# Patient Record
Sex: FEMALE | Race: BLACK OR AFRICAN AMERICAN | Employment: FULL TIME | URBAN - METROPOLITAN AREA
[De-identification: names, ages, dates, MRNs, and addresses within clinical notes are randomized per-mention and may not be internally consistent; named-entity substitution may affect disease eponyms.]

---

## 2017-01-16 ENCOUNTER — APPOINTMENT (OUTPATIENT)
Dept: GENERAL RADIOLOGY | Age: 27
End: 2017-01-16
Attending: EMERGENCY MEDICINE
Payer: COMMERCIAL

## 2017-01-16 ENCOUNTER — HOSPITAL ENCOUNTER (EMERGENCY)
Age: 27
Discharge: HOME | End: 2017-01-16
Attending: EMERGENCY MEDICINE
Payer: COMMERCIAL

## 2017-01-16 VITALS
SYSTOLIC BLOOD PRESSURE: 103 MMHG | DIASTOLIC BLOOD PRESSURE: 68 MMHG | HEIGHT: 65 IN | RESPIRATION RATE: 26 BRPM | OXYGEN SATURATION: 98 % | HEART RATE: 115 BPM | WEIGHT: 293 LBS | TEMPERATURE: 98.7 F | BODY MASS INDEX: 48.82 KG/M2

## 2017-01-16 DIAGNOSIS — R50.9 FEVER, UNSPECIFIED FEVER CAUSE: Primary | ICD-10-CM

## 2017-01-16 DIAGNOSIS — B34.9 VIRAL ILLNESS: ICD-10-CM

## 2017-01-16 LAB
ALBUMIN SERPL BCP-MCNC: 3.1 G/DL (ref 3.5–5)
ALBUMIN/GLOB SERPL: 0.7 {RATIO} (ref 1.1–2.2)
ALP SERPL-CCNC: 87 U/L (ref 45–117)
ALT SERPL-CCNC: 23 U/L (ref 12–78)
ANION GAP BLD CALC-SCNC: 8 MMOL/L (ref 5–15)
APPEARANCE UR: CLEAR
AST SERPL W P-5'-P-CCNC: 21 U/L (ref 15–37)
BACTERIA URNS QL MICRO: ABNORMAL /HPF
BASOPHILS # BLD AUTO: 0 K/UL (ref 0–0.1)
BASOPHILS # BLD: 0 % (ref 0–1)
BILIRUB SERPL-MCNC: 0.4 MG/DL (ref 0.2–1)
BILIRUB UR QL: NEGATIVE
BUN SERPL-MCNC: 8 MG/DL (ref 6–20)
BUN/CREAT SERPL: 11 (ref 12–20)
CALCIUM SERPL-MCNC: 8.4 MG/DL (ref 8.5–10.1)
CHLORIDE SERPL-SCNC: 101 MMOL/L (ref 97–108)
CO2 SERPL-SCNC: 25 MMOL/L (ref 21–32)
COLOR UR: ABNORMAL
CREAT SERPL-MCNC: 0.74 MG/DL (ref 0.55–1.02)
DEPRECATED S PYO AG THROAT QL EIA: NEGATIVE
EOSINOPHIL # BLD: 0.1 K/UL (ref 0–0.4)
EOSINOPHIL NFR BLD: 1 % (ref 0–7)
EPITH CASTS URNS QL MICRO: ABNORMAL /LPF
ERYTHROCYTE [DISTWIDTH] IN BLOOD BY AUTOMATED COUNT: 16.5 % (ref 11.5–14.5)
FLUAV AG NPH QL IA: NEGATIVE
FLUBV AG NOSE QL IA: NEGATIVE
GLOBULIN SER CALC-MCNC: 4.3 G/DL (ref 2–4)
GLUCOSE SERPL-MCNC: 92 MG/DL (ref 65–100)
GLUCOSE UR STRIP.AUTO-MCNC: NEGATIVE MG/DL
HCG UR QL: NEGATIVE
HCT VFR BLD AUTO: 34.3 % (ref 35–47)
HGB BLD-MCNC: 10.6 G/DL (ref 11.5–16)
HGB UR QL STRIP: NEGATIVE
HYALINE CASTS URNS QL MICRO: ABNORMAL /LPF (ref 0–5)
KETONES UR QL STRIP.AUTO: NEGATIVE MG/DL
LEUKOCYTE ESTERASE UR QL STRIP.AUTO: NEGATIVE
LIPASE SERPL-CCNC: 68 U/L (ref 73–393)
LYMPHOCYTES # BLD AUTO: 11 % (ref 12–49)
LYMPHOCYTES # BLD: 1.4 K/UL (ref 0.8–3.5)
MCH RBC QN AUTO: 23.9 PG (ref 26–34)
MCHC RBC AUTO-ENTMCNC: 30.9 G/DL (ref 30–36.5)
MCV RBC AUTO: 77.3 FL (ref 80–99)
MONOCYTES # BLD: 0.5 K/UL (ref 0–1)
MONOCYTES NFR BLD AUTO: 4 % (ref 5–13)
NEUTS SEG # BLD: 11.1 K/UL (ref 1.8–8)
NEUTS SEG NFR BLD AUTO: 84 % (ref 32–75)
NITRITE UR QL STRIP.AUTO: NEGATIVE
PH UR STRIP: 7 [PH] (ref 5–8)
PLATELET # BLD AUTO: 426 K/UL (ref 150–400)
POTASSIUM SERPL-SCNC: 4.6 MMOL/L (ref 3.5–5.1)
PROT SERPL-MCNC: 7.4 G/DL (ref 6.4–8.2)
PROT UR STRIP-MCNC: NEGATIVE MG/DL
RBC # BLD AUTO: 4.44 M/UL (ref 3.8–5.2)
RBC #/AREA URNS HPF: ABNORMAL /HPF (ref 0–5)
SODIUM SERPL-SCNC: 134 MMOL/L (ref 136–145)
SP GR UR REFRACTOMETRY: 1.01 (ref 1–1.03)
UA: UC IF INDICATED,UAUC: ABNORMAL
UROBILINOGEN UR QL STRIP.AUTO: 0.2 EU/DL (ref 0.2–1)
WBC # BLD AUTO: 13.1 K/UL (ref 3.6–11)
WBC URNS QL MICRO: ABNORMAL /HPF (ref 0–4)

## 2017-01-16 PROCEDURE — 71020 XR CHEST PA LAT: CPT

## 2017-01-16 PROCEDURE — 74011250636 HC RX REV CODE- 250/636: Performed by: EMERGENCY MEDICINE

## 2017-01-16 PROCEDURE — 96361 HYDRATE IV INFUSION ADD-ON: CPT

## 2017-01-16 PROCEDURE — 74011250637 HC RX REV CODE- 250/637: Performed by: EMERGENCY MEDICINE

## 2017-01-16 PROCEDURE — 36415 COLL VENOUS BLD VENIPUNCTURE: CPT | Performed by: EMERGENCY MEDICINE

## 2017-01-16 PROCEDURE — 87880 STREP A ASSAY W/OPTIC: CPT | Performed by: EMERGENCY MEDICINE

## 2017-01-16 PROCEDURE — 99285 EMERGENCY DEPT VISIT HI MDM: CPT

## 2017-01-16 PROCEDURE — 87070 CULTURE OTHR SPECIMN AEROBIC: CPT | Performed by: EMERGENCY MEDICINE

## 2017-01-16 PROCEDURE — 87804 INFLUENZA ASSAY W/OPTIC: CPT | Performed by: EMERGENCY MEDICINE

## 2017-01-16 PROCEDURE — 81025 URINE PREGNANCY TEST: CPT

## 2017-01-16 PROCEDURE — 85025 COMPLETE CBC W/AUTO DIFF WBC: CPT | Performed by: EMERGENCY MEDICINE

## 2017-01-16 PROCEDURE — 96374 THER/PROPH/DIAG INJ IV PUSH: CPT

## 2017-01-16 PROCEDURE — 94762 N-INVAS EAR/PLS OXIMTRY CONT: CPT

## 2017-01-16 PROCEDURE — 81001 URINALYSIS AUTO W/SCOPE: CPT | Performed by: EMERGENCY MEDICINE

## 2017-01-16 PROCEDURE — 87147 CULTURE TYPE IMMUNOLOGIC: CPT | Performed by: EMERGENCY MEDICINE

## 2017-01-16 PROCEDURE — 83690 ASSAY OF LIPASE: CPT | Performed by: EMERGENCY MEDICINE

## 2017-01-16 PROCEDURE — 87086 URINE CULTURE/COLONY COUNT: CPT | Performed by: EMERGENCY MEDICINE

## 2017-01-16 PROCEDURE — 93005 ELECTROCARDIOGRAM TRACING: CPT

## 2017-01-16 PROCEDURE — 80053 COMPREHEN METABOLIC PANEL: CPT | Performed by: EMERGENCY MEDICINE

## 2017-01-16 RX ORDER — KETOROLAC TROMETHAMINE 30 MG/ML
30 INJECTION, SOLUTION INTRAMUSCULAR; INTRAVENOUS
Status: COMPLETED | OUTPATIENT
Start: 2017-01-16 | End: 2017-01-16

## 2017-01-16 RX ORDER — METFORMIN HYDROCHLORIDE 500 MG/1
500 TABLET ORAL DAILY
COMMUNITY
End: 2019-09-25

## 2017-01-16 RX ORDER — ONDANSETRON 4 MG/1
4 TABLET, ORALLY DISINTEGRATING ORAL
Qty: 10 TAB | Refills: 0 | Status: SHIPPED | OUTPATIENT
Start: 2017-01-16 | End: 2017-11-12

## 2017-01-16 RX ORDER — ACETAMINOPHEN 325 MG/1
650 TABLET ORAL
Status: COMPLETED | OUTPATIENT
Start: 2017-01-16 | End: 2017-01-16

## 2017-01-16 RX ADMIN — KETOROLAC TROMETHAMINE 30 MG: 30 INJECTION, SOLUTION INTRAMUSCULAR at 20:50

## 2017-01-16 RX ADMIN — SODIUM CHLORIDE 1000 ML: 900 INJECTION, SOLUTION INTRAVENOUS at 20:49

## 2017-01-16 RX ADMIN — ACETAMINOPHEN 650 MG: 325 TABLET ORAL at 18:22

## 2017-01-16 NOTE — LETTER
[facility] EMERGENCY DEPT 
[address] [facility] [address] [address] 
[phone] Work/School Note Date: 1/16/2017 To Whom It May concern: 
 
Harsha Contreras was seen and treated today in the emergency room by the following provider(s): 
Attending Provider: Irvin Garcia DO. Harsha Contreras may return to work on 1/19/2017. Sincerely, Irvin Garcia DO

## 2017-01-17 LAB
ATRIAL RATE: 131 BPM
CALCULATED P AXIS, ECG09: 47 DEGREES
CALCULATED R AXIS, ECG10: 14 DEGREES
CALCULATED T AXIS, ECG11: 33 DEGREES
DIAGNOSIS, 93000: NORMAL
P-R INTERVAL, ECG05: 150 MS
Q-T INTERVAL, ECG07: 272 MS
QRS DURATION, ECG06: 64 MS
QTC CALCULATION (BEZET), ECG08: 401 MS
VENTRICULAR RATE, ECG03: 131 BPM

## 2017-01-17 NOTE — DISCHARGE INSTRUCTIONS
We hope that we have addressed all of your medical concerns. The examination and treatment you received in the Emergency Department were for an emergent problem and were not intended as complete care. It is important that you follow up with your healthcare provider(s) for ongoing care. If your symptoms worsen or do not improve as expected, and you are unable to reach your usual health care provider(s), you should return to the Emergency Department. Today's healthcare is undergoing tremendous change, and patient satisfaction surveys are one of the many tools to assess the quality of medical care. You may receive a survey from the [employer] organization regarding your experience in the Emergency Department. I hope that your experience has been completely positive, particularly the medical care that I provided. As such, please participate in the survey; anything less than excellent does not meet my expectations or intentions. [facility] and [facility]) participate in nationally recognized quality of care measures. If your blood pressure is greater than 120/80, as reported below, we urge that you seek medical care to address the potential of high blood pressure, commonly known as hypertension. Hypertension can be hereditary or can be caused by certain medical conditions, pain, stress, or \"white coat syndrome. \"       Please make an appointment with your health care provider(s) for follow up of your Emergency Department visit. VITALS:   Patient Vitals for the past 8 hrs:   Temp Pulse Resp BP SpO2   01/16/17 2208 100 °F (37.8 °C) (!) 115 24 103/68 96 %   01/16/17 2100 (!) 101.3 °F (38.5 °C) (!) 128 24 - 98 %   01/16/17 2045 - - - 136/61 96 %   01/16/17 1737 (!) 102.8 °F (39.3 °C) (!) 142 22 135/84 100 %          Thank you for allowing us to provide you with medical care today. We realize that you have many choices for your emergency care needs. Please choose us in the future for any continued health care needs. Quinn Sanchez Noreen [address] [place] [address].   Office: [phone]            Recent Results (from the past 24 hour(s))   EKG, 12 LEAD, INITIAL    Collection Time: 01/16/17  6:31 PM   Result Value Ref Range    Ventricular Rate 131 BPM    Atrial Rate 131 BPM    P-R Interval 150 ms    QRS Duration 64 ms    Q-T Interval 272 ms    QTC Calculation (Bezet) 401 ms    Calculated P Axis 47 degrees    Calculated R Axis 14 degrees    Calculated T Axis 33 degrees    Diagnosis       Sinus tachycardia  Otherwise normal ECG  When compared with ECG of 30-OCT-2016 18:05,  No significant change was found     INFLUENZA A & B AG (RAPID TEST)    Collection Time: 01/16/17  6:36 PM   Result Value Ref Range    Influenza A Antigen NEGATIVE  NEG      Influenza B Antigen NEGATIVE  NEG     HCG URINE, QL. - POC    Collection Time: 01/16/17  8:35 PM   Result Value Ref Range    Pregnancy test,urine (POC) NEGATIVE  NEG     CBC WITH AUTOMATED DIFF    Collection Time: 01/16/17  8:36 PM   Result Value Ref Range    WBC 13.1 (H) 3.6 - 11.0 K/uL    RBC 4.44 3.80 - 5.20 M/uL    HGB 10.6 (L) 11.5 - 16.0 g/dL    HCT 34.3 (L) 35.0 - 47.0 %    MCV 77.3 (L) 80.0 - 99.0 FL    MCH 23.9 (L) 26.0 - 34.0 PG    MCHC 30.9 30.0 - 36.5 g/dL    RDW 16.5 (H) 11.5 - 14.5 %    PLATELET 668 (H) 370 - 400 K/uL    NEUTROPHILS 84 (H) 32 - 75 %    LYMPHOCYTES 11 (L) 12 - 49 %    MONOCYTES 4 (L) 5 - 13 %    EOSINOPHILS 1 0 - 7 %    BASOPHILS 0 0 - 1 %    ABS. NEUTROPHILS 11.1 (H) 1.8 - 8.0 K/UL    ABS. LYMPHOCYTES 1.4 0.8 - 3.5 K/UL    ABS. MONOCYTES 0.5 0.0 - 1.0 K/UL    ABS. EOSINOPHILS 0.1 0.0 - 0.4 K/UL    ABS.  BASOPHILS 0.0 0.0 - 0.1 K/UL   URINALYSIS W/ REFLEX CULTURE    Collection Time: 01/16/17  8:36 PM   Result Value Ref Range    Color YELLOW/STRAW      Appearance CLEAR CLEAR      Specific gravity 1.014 1.003 - 1.030      pH (UA) 7.0 5.0 - 8.0      Protein NEGATIVE  NEG mg/dL    Glucose NEGATIVE  NEG mg/dL    Ketone NEGATIVE  NEG mg/dL    Bilirubin NEGATIVE  NEG      Blood NEGATIVE  NEG      Urobilinogen 0.2 0.2 - 1.0 EU/dL    Nitrites NEGATIVE  NEG      Leukocyte Esterase NEGATIVE  NEG      WBC 0-4 0 - 4 /hpf    RBC 0-5 0 - 5 /hpf    Epithelial cells FEW FEW /lpf    Bacteria 1+ (A) NEG /hpf    UA:UC IF INDICATED URINE CULTURE ORDERED (A) CNI      Hyaline Cast 0-2 0 - 5 /lpf   LIPASE    Collection Time: 01/16/17  8:36 PM   Result Value Ref Range    Lipase 68 (L) 73 - 393 U/L   STREP AG SCREEN, GROUP A    Collection Time: 01/16/17  8:36 PM   Result Value Ref Range    Group A Strep Ag ID NEGATIVE  NEG     METABOLIC PANEL, COMPREHENSIVE    Collection Time: 01/16/17  8:38 PM   Result Value Ref Range    Sodium 134 (L) 136 - 145 mmol/L    Potassium 4.6 3.5 - 5.1 mmol/L    Chloride 101 97 - 108 mmol/L    CO2 25 21 - 32 mmol/L    Anion gap 8 5 - 15 mmol/L    Glucose 92 65 - 100 mg/dL    BUN 8 6 - 20 MG/DL    Creatinine 0.74 0.55 - 1.02 MG/DL    BUN/Creatinine ratio 11 (L) 12 - 20      GFR est AA >60 >60 ml/min/1.73m2    GFR est non-AA >60 >60 ml/min/1.73m2    Calcium 8.4 (L) 8.5 - 10.1 MG/DL    Bilirubin, total 0.4 0.2 - 1.0 MG/DL    ALT 23 12 - 78 U/L    AST 21 15 - 37 U/L    Alk. phosphatase 87 45 - 117 U/L    Protein, total 7.4 6.4 - 8.2 g/dL    Albumin 3.1 (L) 3.5 - 5.0 g/dL    Globulin 4.3 (H) 2.0 - 4.0 g/dL    A-G Ratio 0.7 (L) 1.1 - 2.2         Xr Chest Pa Lat    Result Date: 1/16/2017  INDICATION:   Cough, chills, headache, weakness, diarrhea COMPARISON: None FINDINGS: Frontal and lateral views of the chest demonstrate a normal cardiomediastinal silhouette. The lungs are adequately expanded. There is no edema, effusion, consolidation, or pneumothorax. The osseous structures are unremarkable. IMPRESSION: No acute process. Learning About Fever  What is a fever? A fever is a high body temperature. It's one way your body fights being sick.  A fever shows that the body is responding to infection or other illnesses, both minor and severe. A fever is a symptom, not an illness by itself. A fever can be a sign that you are ill, but most fevers are not caused by a serious problem. You may have a fever with a minor illness, such as a cold. But sometimes a very serious infection may cause little or no fever. It is important to look at other symptoms, other conditions you have, and how you feel in general. In children, notice how they act and see what symptoms they complain of. What is a normal body temperature? A normal body temperature is about 98. 6ºF. Some people have a normal temperature that is a little higher or a little lower than this. Your temperature may be a little lower in the morning than it is later in the day. It may go up during hot weather or when you exercise, wear heavy clothes, or take a hot bath. Your temperature may also be different depending on how you take it. A temperature taken in the mouth (oral) or under the arm may be a little lower than your core temperature (rectal). What is a fever temperature? A core temperature of 100.4°F or above is considered a fever. What can cause a fever? A fever may be caused by:  · Infections. This is the most common cause of a fever. Examples of infections that can cause a fever include the flu, a kidney infection, or pneumonia. · Some medicines. · Severe trauma or injury, such as a heart attack, stroke, heatstroke, or burns. · Other medical conditions, such as arthritis and some cancers. How can you treat a fever at home? · Ask your doctor if you can take an over-the-counter pain medicine, such as acetaminophen (Tylenol), ibuprofen (Advil, Motrin), or naproxen (Aleve). Be safe with medicines. Read and follow all instructions on the label. · To prevent dehydration, drink plenty of fluids. Choose water and other caffeine-free clear liquids until you feel better.  If you have kidney, heart, or liver disease and have to limit fluids, talk with your doctor before you increase the amount of fluids you drink. Follow-up care is a key part of your treatment and safety. Be sure to make and go to all appointments, and call your doctor if you are having problems. It's also a good idea to know your test results and keep a list of the medicines you take. Where can you learn more? Go to http://fozia-radha.info/. Enter H965 in the search box to learn more about \"Learning About Fever. \"  Current as of: May 27, 2016  Content Version: 11.1  © 2470-9345 [employer]. Care instructions adapted under license by [employer] (which disclaims liability or warranty for this information). If you have questions about a medical condition or this instruction, always ask your healthcare professional. [address] any warranty or liability for your use of this information. Viral Infections: Care Instructions  Your Care Instructions  You don't feel well, but it's not clear what's causing it. You may have a viral infection. Viruses cause many illnesses, such as the common cold, influenza, fever, rashes, and the diarrhea, nausea, and vomiting that are often called \"stomach flu. \" You may wonder if antibiotic medicines could make you feel better. But antibiotics only treat infections caused by bacteria. They don't work on viruses. The good news is that viral infections usually aren't serious. Most will go away in a few days without medical treatment. In the meantime, there are a few things you can do to make yourself more comfortable. Follow-up care is a key part of your treatment and safety. Be sure to make and go to all appointments, and call your doctor if you are having problems. It's also a good idea to know your test results and keep a list of the medicines you take. How can you care for yourself at home? · Get plenty of rest if you feel tired.   · Take an over-the-counter pain medicine if needed, such as acetaminophen (Tylenol), ibuprofen (Advil, Motrin), or naproxen (Aleve). Read and follow all instructions on the label. · Be careful when taking over-the-counter cold or flu medicines and Tylenol at the same time. Many of these medicines have acetaminophen, which is Tylenol. Read the labels to make sure that you are not taking more than the recommended dose. Too much acetaminophen (Tylenol) can be harmful. · Drink plenty of fluids, enough so that your urine is light yellow or clear like water. If you have kidney, heart, or liver disease and have to limit fluids, talk with your doctor before you increase the amount of fluids you drink. · Stay home from work, school, and other public places while you have a fever. When should you call for help? Call 911 anytime you think you may need emergency care. For example, call if:  · You have severe trouble breathing. · You passed out (lost consciousness). Call your doctor now or seek immediate medical care if:  · You seem to be getting much sicker. · You have a new or higher fever. · You have blood in your stools. · You have new belly pain, or your pain gets worse. · You have a new rash. Watch closely for changes in your health, and be sure to contact your doctor if:  · You start to get better and then get worse. · You do not get better as expected. Where can you learn more? Go to http://fozia-radha.info/. Enter U192 in the search box to learn more about \"Viral Infections: Care Instructions. \"  Current as of: May 24, 2016  Content Version: 11.1  © 8251-3261 [employer]. Care instructions adapted under license by [employer] (which disclaims liability or warranty for this information).  If you have questions about a medical condition or this instruction, always ask your healthcare professional. [address]  any warranty or liability for your use of this information.

## 2017-01-17 NOTE — ED PROVIDER NOTES
HPI Comments: 27 y.o. female with past medical history significant for PCOS on Metformin who presents ambulatory from home accompanied by her sister with chief complaint of fever. Pt started with a fever today (Tmax 102.8) accompanied by diffuse abdominal cramping, an 8/10 headache, generalized weakness, and chills. She also reports a productive cough with clear sputum that began 3 months ago as well as diarrhea today, which she attributes to starting Metformin yesterday for her hx of PCOS. She admits she has not taken anything for her symptoms. There are no other acute medical concerns at this time. Social hx: Never smoker; socially EtOH use; no illicit drug use. PCP: Moises Ashley MD    Note written by Tanesha Crocker, as dictated by Delmi Hoskins, DO 8:00 PM      The history is provided by the patient. Past Medical History:   Diagnosis Date    Abnormal pap 1/2014+ 2/2015     LGSIL pap    Anemia NEC     HPV vaccine counseling      gardasil series complete     HSV-2 infection     PCOS (polycystic ovarian syndrome)        Past Surgical History:   Procedure Laterality Date    Hx colposcopy  2/2014 + 4/2015     YAHAIRA I; 2015 neg path         Family History:   Problem Relation Age of Onset    Heart Disease Father     Diabetes Father        Social History     Social History    Marital status: SINGLE     Spouse name: N/A    Number of children: N/A    Years of education: N/A     Occupational History    Not on file.      Social History Main Topics    Smoking status: Never Smoker    Smokeless tobacco: Not on file    Alcohol use 0.0 oz/week     0 Standard drinks or equivalent per week      Comment: socially    Drug use: Not on file    Sexual activity: Yes     Partners: Male     Birth control/ protection: Condom     Other Topics Concern    Not on file     Social History Narrative     ALLERGIES: Aspirin    Review of Systems   Constitutional: Positive for chills, fatigue and fever. Negative for appetite change and unexpected weight change. HENT: Negative for ear pain, hearing loss, rhinorrhea and trouble swallowing. Eyes: Negative for pain and visual disturbance. Respiratory: Negative for cough, chest tightness and shortness of breath. Cardiovascular: Negative for chest pain and palpitations. Gastrointestinal: Positive for abdominal pain (described as cramping) and diarrhea. Negative for abdominal distention, blood in stool, nausea and vomiting. Genitourinary: Negative for dysuria, hematuria and urgency. Musculoskeletal: Negative for back pain and myalgias. Skin: Negative for rash. Neurological: Positive for headaches. Negative for dizziness, syncope, weakness and numbness. Psychiatric/Behavioral: Negative for confusion and suicidal ideas. All other systems reviewed and are negative. Vitals:    01/16/17 1737   BP: 135/84   Pulse: (!) 142   Resp: 22   Temp: (!) 102.8 °F (39.3 °C)   SpO2: 100%   Weight: 136.1 kg (300 lb)   Height: 5' 5\" (1.651 m)            Physical Exam   Constitutional: She is oriented to person, place, and time. She appears well-developed and well-nourished. No distress. HENT:   Head: Normocephalic and atraumatic. Right Ear: Tympanic membrane and external ear normal.   Left Ear: Tympanic membrane and external ear normal.   Nose: Nose normal.   Mouth/Throat: Posterior oropharyngeal erythema present. No oropharyngeal exudate. Eyes: Conjunctivae and EOM are normal. Pupils are equal, round, and reactive to light. Right eye exhibits no discharge. Left eye exhibits no discharge. No scleral icterus. Neck: Normal range of motion. Neck supple. No JVD present. No tracheal deviation present. No meningismus signs. Cardiovascular: Regular rhythm, normal heart sounds and intact distal pulses. Tachycardia present. Exam reveals no gallop and no friction rub. No murmur heard. Pulmonary/Chest: Effort normal. No stridor.  No respiratory distress. She has decreased breath sounds in the left lower field. She has no wheezes. She has no rhonchi. She has no rales. She exhibits no tenderness. Abdominal: Soft. Bowel sounds are normal. She exhibits no distension. There is no tenderness. There is no rebound and no guarding. Musculoskeletal: Normal range of motion. She exhibits no edema or tenderness. Neurological: She is alert and oriented to person, place, and time. She has normal strength and normal reflexes. No cranial nerve deficit or sensory deficit. She exhibits normal muscle tone. Coordination normal. GCS eye subscore is 4. GCS verbal subscore is 5. GCS motor subscore is 6. Skin: Skin is warm and dry. No rash noted. She is not diaphoretic. No erythema. No pallor. Psychiatric: She has a normal mood and affect. Her behavior is normal. Judgment and thought content normal.   Nursing note and vitals reviewed. Note written by Pete Gunderson. Danial Dodge, as dictated by Mami Moore, DO 7:57 PM    MDM  Number of Diagnoses or Management Options  Fever, unspecified fever cause:   Viral illness:      Amount and/or Complexity of Data Reviewed  Clinical lab tests: ordered and reviewed  Tests in the radiology section of CPT®: ordered and reviewed  Tests in the medicine section of CPT®: ordered and reviewed  Independent visualization of images, tracings, or specimens: yes (ekg)    Risk of Complications, Morbidity, and/or Mortality  Presenting problems: moderate  Diagnostic procedures: low  Management options: moderate    Patient Progress  Patient progress: improved    ED Course       Procedures    PROGRESS NOTE:  10:19 PM  Pt was reevaluated and is feeling better. Her fever and heart rate are improved. Chest x-ray shows no acute processes. She will be discharged home with Zofran and PCP follow up.    10:21 PM  Hamilton's results have been reviewed with her.   She has verbally conveyed her understanding and agreement of the signs, symptoms, diagnosis, treatment and prognosis and additionally agree to follow up as recommended or return to the Emergency Room should her condition change prior to follow-up. Discharge instructions have also been provided to the patient with some educational information regarding her diagnosis as well a list of reasons why she would want to return to the ER prior to her follow-up appointment should her condition change. Chief Complaint   Patient presents with    Chills    Headache    Fatigue    Diarrhea       12:34 AM  The patients presenting problems have been discussed, and they are in agreement with the care plan formulated and outlined with them. I have encouraged them to ask questions as they arise throughout their visit.     MEDICATIONS GIVEN:  Medications   acetaminophen (TYLENOL) tablet 650 mg (650 mg Oral Given 1/16/17 1822)   sodium chloride 0.9 % bolus infusion 1,000 mL (0 mL IntraVENous IV Completed 1/16/17 2219)   ketorolac (TORADOL) injection 30 mg (30 mg IntraVENous Given 1/16/17 2050)       LABS REVIEWED:  Recent Results (from the past 24 hour(s))   EKG, 12 LEAD, INITIAL    Collection Time: 01/16/17  6:31 PM   Result Value Ref Range    Ventricular Rate 131 BPM    Atrial Rate 131 BPM    P-R Interval 150 ms    QRS Duration 64 ms    Q-T Interval 272 ms    QTC Calculation (Bezet) 401 ms    Calculated P Axis 47 degrees    Calculated R Axis 14 degrees    Calculated T Axis 33 degrees    Diagnosis       Sinus tachycardia  Otherwise normal ECG  When compared with ECG of 30-OCT-2016 18:05,  No significant change was found     INFLUENZA A & B AG (RAPID TEST)    Collection Time: 01/16/17  6:36 PM   Result Value Ref Range    Influenza A Antigen NEGATIVE  NEG      Influenza B Antigen NEGATIVE  NEG     HCG URINE, QL. - POC    Collection Time: 01/16/17  8:35 PM   Result Value Ref Range    Pregnancy test,urine (POC) NEGATIVE  NEG     CBC WITH AUTOMATED DIFF    Collection Time: 01/16/17  8:36 PM   Result Value Ref Range WBC 13.1 (H) 3.6 - 11.0 K/uL    RBC 4.44 3.80 - 5.20 M/uL    HGB 10.6 (L) 11.5 - 16.0 g/dL    HCT 34.3 (L) 35.0 - 47.0 %    MCV 77.3 (L) 80.0 - 99.0 FL    MCH 23.9 (L) 26.0 - 34.0 PG    MCHC 30.9 30.0 - 36.5 g/dL    RDW 16.5 (H) 11.5 - 14.5 %    PLATELET 748 (H) 530 - 400 K/uL    NEUTROPHILS 84 (H) 32 - 75 %    LYMPHOCYTES 11 (L) 12 - 49 %    MONOCYTES 4 (L) 5 - 13 %    EOSINOPHILS 1 0 - 7 %    BASOPHILS 0 0 - 1 %    ABS. NEUTROPHILS 11.1 (H) 1.8 - 8.0 K/UL    ABS. LYMPHOCYTES 1.4 0.8 - 3.5 K/UL    ABS. MONOCYTES 0.5 0.0 - 1.0 K/UL    ABS. EOSINOPHILS 0.1 0.0 - 0.4 K/UL    ABS.  BASOPHILS 0.0 0.0 - 0.1 K/UL   URINALYSIS W/ REFLEX CULTURE    Collection Time: 01/16/17  8:36 PM   Result Value Ref Range    Color YELLOW/STRAW      Appearance CLEAR CLEAR      Specific gravity 1.014 1.003 - 1.030      pH (UA) 7.0 5.0 - 8.0      Protein NEGATIVE  NEG mg/dL    Glucose NEGATIVE  NEG mg/dL    Ketone NEGATIVE  NEG mg/dL    Bilirubin NEGATIVE  NEG      Blood NEGATIVE  NEG      Urobilinogen 0.2 0.2 - 1.0 EU/dL    Nitrites NEGATIVE  NEG      Leukocyte Esterase NEGATIVE  NEG      WBC 0-4 0 - 4 /hpf    RBC 0-5 0 - 5 /hpf    Epithelial cells FEW FEW /lpf    Bacteria 1+ (A) NEG /hpf    UA:UC IF INDICATED URINE CULTURE ORDERED (A) CNI      Hyaline Cast 0-2 0 - 5 /lpf   LIPASE    Collection Time: 01/16/17  8:36 PM   Result Value Ref Range    Lipase 68 (L) 73 - 393 U/L   STREP AG SCREEN, GROUP A    Collection Time: 01/16/17  8:36 PM   Result Value Ref Range    Group A Strep Ag ID NEGATIVE  NEG     METABOLIC PANEL, COMPREHENSIVE    Collection Time: 01/16/17  8:38 PM   Result Value Ref Range    Sodium 134 (L) 136 - 145 mmol/L    Potassium 4.6 3.5 - 5.1 mmol/L    Chloride 101 97 - 108 mmol/L    CO2 25 21 - 32 mmol/L    Anion gap 8 5 - 15 mmol/L    Glucose 92 65 - 100 mg/dL    BUN 8 6 - 20 MG/DL    Creatinine 0.74 0.55 - 1.02 MG/DL    BUN/Creatinine ratio 11 (L) 12 - 20      GFR est AA >60 >60 ml/min/1.73m2    GFR est non-AA >60 >60 ml/min/1.73m2    Calcium 8.4 (L) 8.5 - 10.1 MG/DL    Bilirubin, total 0.4 0.2 - 1.0 MG/DL    ALT 23 12 - 78 U/L    AST 21 15 - 37 U/L    Alk. phosphatase 87 45 - 117 U/L    Protein, total 7.4 6.4 - 8.2 g/dL    Albumin 3.1 (L) 3.5 - 5.0 g/dL    Globulin 4.3 (H) 2.0 - 4.0 g/dL    A-G Ratio 0.7 (L) 1.1 - 2.2         VITAL SIGNS:  Patient Vitals for the past 12 hrs:   Temp Pulse Resp BP SpO2   01/16/17 2224 98.7 °F (37.1 °C) - - - -   01/16/17 2221 - (!) 115 26 - 98 %   01/16/17 2208 100 °F (37.8 °C) (!) 115 24 103/68 96 %   01/16/17 2208 - (!) 114 19 103/68 98 %   01/16/17 2100 (!) 101.3 °F (38.5 °C) (!) 128 24 - 98 %   01/16/17 2045 - - - 136/61 96 %   01/16/17 1737 (!) 102.8 °F (39.3 °C) (!) 142 22 135/84 100 %       RADIOLOGY RESULTS:  The following have been ordered and reviewed:  XR CHEST PA LAT   Final Result        Study Result      INDICATION: Cough, chills, headache, weakness, diarrhea     COMPARISON: None     FINDINGS:     Frontal and lateral views of the chest demonstrate a normal cardiomediastinal  silhouette. The lungs are adequately expanded. There is no edema, effusion,  consolidation, or pneumothorax. The osseous structures are unremarkable.     IMPRESSION  IMPRESSION:  No acute process. PROGRESS NOTES:  Discussed results and plan with patient. Patient will be discharged home with PCP followup. Patient instructed to return to the emergency room for any worsening symptoms or any other concerns. DIAGNOSIS:    1. Fever, unspecified fever cause    2.  Viral illness        PLAN:  Follow-up Information     Follow up With Details Comments [address] Fanta Gilbert MD Schedule an appointment as soon as possible for a visit  [address] [address]  [phone]      [facility] EMERGENCY DEPT  If symptoms worsen [address]  [phone]        Discharge Medication List as of 1/16/2017 10:18 PM      START taking these medications Details   ondansetron (ZOFRAN ODT) 4 mg disintegrating tablet Take 1 Tab by mouth every eight (8) hours as needed for Nausea. , Print, Disp-10 Tab, R-0         CONTINUE these medications which have NOT CHANGED    Details   metFORMIN (GLUCOPHAGE) 500 mg tablet Take 500 mg by mouth daily. , Historical Med      OTHER Birth control pills, Historical Med      norethindrone-ethinyl estradiol-iron (LOESTRIN FE 1.5/30, 28-DAY,) 1.5 mg-30 mcg (21)/75 mg (7) tab Take 1 Tab by mouth daily. , Normal, Disp-84 Tab, R-2      valACYclovir (VALTREX) 500 mg tablet Take two po for 3 days, Normal, Disp-6 Tab, R-6               ED COURSE: The patients hospital course has been uncomplicated.

## 2017-01-17 NOTE — ED NOTES
Entered room and IV fluids had been stopped and were not infusing. Restarted and instructed pt and family to please call if IV beeps again.

## 2017-01-18 LAB
BACTERIA SPEC CULT: NORMAL
CC UR VC: NORMAL
SERVICE CMNT-IMP: NORMAL
SERVICE CMNT-IMP: NORMAL

## 2017-10-19 ENCOUNTER — HOSPITAL ENCOUNTER (OUTPATIENT)
Dept: ULTRASOUND IMAGING | Age: 27
Discharge: HOME | End: 2017-10-19
Payer: COMMERCIAL

## 2017-10-19 DIAGNOSIS — M79.661 BILATERAL CALF PAIN: ICD-10-CM

## 2017-10-19 DIAGNOSIS — N63.15 BREAST LUMP ON RIGHT SIDE AT 3 O'CLOCK POSITION: ICD-10-CM

## 2017-10-19 DIAGNOSIS — M79.662 BILATERAL CALF PAIN: ICD-10-CM

## 2017-10-19 PROCEDURE — 93971 EXTREMITY STUDY: CPT

## 2017-10-19 PROCEDURE — 76642 ULTRASOUND BREAST LIMITED: CPT

## 2017-11-09 ENCOUNTER — HOSPITAL ENCOUNTER (OUTPATIENT)
Dept: GENERAL RADIOLOGY | Age: 27
Discharge: HOME | End: 2017-11-09
Payer: COMMERCIAL

## 2017-11-09 DIAGNOSIS — R60.1 GENERALIZED EDEMA: ICD-10-CM

## 2017-11-09 DIAGNOSIS — R70.0 ELEVATED SEDIMENTATION RATE: ICD-10-CM

## 2017-11-09 DIAGNOSIS — M25.50 ARTHRALGIA, UNSPECIFIED JOINT: ICD-10-CM

## 2017-11-09 PROCEDURE — 73610 X-RAY EXAM OF ANKLE: CPT

## 2017-11-09 PROCEDURE — 73562 X-RAY EXAM OF KNEE 3: CPT

## 2017-11-09 PROCEDURE — 73110 X-RAY EXAM OF WRIST: CPT

## 2017-11-09 PROCEDURE — 72100 X-RAY EXAM L-S SPINE 2/3 VWS: CPT

## 2017-11-09 PROCEDURE — 71020 XR CHEST PA LAT: CPT

## 2017-11-12 ENCOUNTER — HOSPITAL ENCOUNTER (EMERGENCY)
Age: 27
Discharge: HOME | End: 2017-11-12
Attending: EMERGENCY MEDICINE
Payer: COMMERCIAL

## 2017-11-12 ENCOUNTER — APPOINTMENT (OUTPATIENT)
Dept: ULTRASOUND IMAGING | Age: 27
End: 2017-11-12
Attending: NURSE PRACTITIONER
Payer: COMMERCIAL

## 2017-11-12 VITALS
HEIGHT: 65 IN | TEMPERATURE: 98.3 F | RESPIRATION RATE: 17 BRPM | OXYGEN SATURATION: 100 % | SYSTOLIC BLOOD PRESSURE: 147 MMHG | WEIGHT: 293 LBS | DIASTOLIC BLOOD PRESSURE: 77 MMHG | BODY MASS INDEX: 48.82 KG/M2 | HEART RATE: 83 BPM

## 2017-11-12 DIAGNOSIS — N93.9 ABNORMAL UTERINE BLEEDING: Primary | ICD-10-CM

## 2017-11-12 LAB
APPEARANCE UR: CLEAR
BACTERIA URNS QL MICRO: NEGATIVE /HPF
BASOPHILS # BLD: 0 K/UL (ref 0–0.1)
BASOPHILS NFR BLD: 0 % (ref 0–1)
BILIRUB UR QL: NEGATIVE
CLUE CELLS VAG QL WET PREP: NORMAL
COLOR UR: ABNORMAL
EOSINOPHIL # BLD: 0.3 K/UL (ref 0–0.4)
EOSINOPHIL NFR BLD: 2 % (ref 0–7)
EPITH CASTS URNS QL MICRO: ABNORMAL /LPF
ERYTHROCYTE [DISTWIDTH] IN BLOOD BY AUTOMATED COUNT: 14.8 % (ref 11.5–14.5)
GLUCOSE UR STRIP.AUTO-MCNC: NEGATIVE MG/DL
HCT VFR BLD AUTO: 32.2 % (ref 35–47)
HGB BLD-MCNC: 10.7 G/DL (ref 11.5–16)
HGB UR QL STRIP: ABNORMAL
HYALINE CASTS URNS QL MICRO: ABNORMAL /LPF (ref 0–5)
KETONES UR QL STRIP.AUTO: NEGATIVE MG/DL
KOH PREP SPEC: NORMAL
LEUKOCYTE ESTERASE UR QL STRIP.AUTO: NEGATIVE
LYMPHOCYTES # BLD: 3.8 K/UL (ref 0.8–3.5)
LYMPHOCYTES NFR BLD: 31 % (ref 12–49)
MCH RBC QN AUTO: 27.5 PG (ref 26–34)
MCHC RBC AUTO-ENTMCNC: 33.2 G/DL (ref 30–36.5)
MCV RBC AUTO: 82.8 FL (ref 80–99)
MONOCYTES # BLD: 0.7 K/UL (ref 0–1)
MONOCYTES NFR BLD: 6 % (ref 5–13)
NEUTS SEG # BLD: 7.6 K/UL (ref 1.8–8)
NEUTS SEG NFR BLD: 61 % (ref 32–75)
NITRITE UR QL STRIP.AUTO: NEGATIVE
PH UR STRIP: 6 [PH] (ref 5–8)
PLATELET # BLD AUTO: 344 K/UL (ref 150–400)
PROT UR STRIP-MCNC: NEGATIVE MG/DL
RBC # BLD AUTO: 3.89 M/UL (ref 3.8–5.2)
RBC #/AREA URNS HPF: >100 /HPF (ref 0–5)
SERVICE CMNT-IMP: NORMAL
SP GR UR REFRACTOMETRY: 1.01 (ref 1–1.03)
T VAGINALIS VAG QL WET PREP: NORMAL
UA: UC IF INDICATED,UAUC: ABNORMAL
UROBILINOGEN UR QL STRIP.AUTO: 0.2 EU/DL (ref 0.2–1)
WBC # BLD AUTO: 12.4 K/UL (ref 3.6–11)
WBC URNS QL MICRO: ABNORMAL /HPF (ref 0–4)

## 2017-11-12 PROCEDURE — 76856 US EXAM PELVIC COMPLETE: CPT

## 2017-11-12 PROCEDURE — 36415 COLL VENOUS BLD VENIPUNCTURE: CPT | Performed by: NURSE PRACTITIONER

## 2017-11-12 PROCEDURE — 99284 EMERGENCY DEPT VISIT MOD MDM: CPT

## 2017-11-12 PROCEDURE — 81001 URINALYSIS AUTO W/SCOPE: CPT | Performed by: EMERGENCY MEDICINE

## 2017-11-12 PROCEDURE — 76830 TRANSVAGINAL US NON-OB: CPT

## 2017-11-12 PROCEDURE — 87491 CHLMYD TRACH DNA AMP PROBE: CPT | Performed by: NURSE PRACTITIONER

## 2017-11-12 PROCEDURE — 87210 SMEAR WET MOUNT SALINE/INK: CPT | Performed by: NURSE PRACTITIONER

## 2017-11-12 PROCEDURE — 85025 COMPLETE CBC W/AUTO DIFF WBC: CPT | Performed by: NURSE PRACTITIONER

## 2017-11-12 RX ORDER — NALTREXONE HYDROCHLORIDE 50 MG/1
50 TABLET, FILM COATED ORAL DAILY
COMMUNITY
End: 2019-07-14

## 2017-11-12 RX ORDER — MEDROXYPROGESTERONE ACETATE 10 MG/1
10 TABLET ORAL DAILY
COMMUNITY
End: 2020-04-16

## 2017-11-12 RX ORDER — NORETHINDRONE ACETATE AND ETHINYL ESTRADIOL 1; .02 MG/1; MG/1
TABLET ORAL
COMMUNITY
End: 2020-04-16

## 2017-11-12 RX ORDER — SERTRALINE HYDROCHLORIDE 50 MG/1
50 TABLET, FILM COATED ORAL DAILY
COMMUNITY
End: 2019-11-18

## 2017-11-12 RX ORDER — MELOXICAM 15 MG/1
15 TABLET ORAL DAILY
COMMUNITY
End: 2019-07-14

## 2017-11-12 NOTE — ED TRIAGE NOTES
Patient states she has had vaginal bleeding x 11 days, saturating a pad or more per hours with golf ball sized clots. Patient states obgyn put her on provera and birth control and states it did not help.

## 2017-11-12 NOTE — DISCHARGE INSTRUCTIONS
If you begin having any chest pain or shortness of breath and dizziness, please return to the emergency room. Please follow up ASAP with your GYN and inform them of this visit to the emergency room. Abnormal Uterine Bleeding: Care Instructions  Your Care Instructions    Abnormal uterine bleeding (AUB) is irregular bleeding from the uterus that is longer or heavier than usual or does not occur at your regular time. Sometimes it is caused by changes in hormone levels. It can also be caused by growths in the uterus, such as fibroids or polyps. Sometimes a cause cannot be found. You may have heavy bleeding when you are not expecting your period. Your doctor may suggest a pregnancy test, if you think you are pregnant. Follow-up care is a key part of your treatment and safety. Be sure to make and go to all appointments, and call your doctor if you are having problems. It's also a good idea to know your test results and keep a list of the medicines you take. How can you care for yourself at home? · Be safe with medicines. Take pain medicines exactly as directed. ¨ If the doctor gave you a prescription medicine for pain, take it as prescribed. ¨ If you are not taking a prescription pain medicine, ask your doctor if you can take an over-the-counter medicine. · You may be low in iron because of blood loss. Eat a balanced diet that is high in iron and vitamin C. Foods rich in iron include red meat, shellfish, eggs, beans, and leafy green vegetables. Talk to your doctor about whether you need to take iron pills or a multivitamin. When should you call for help? Call 911 anytime you think you may need emergency care. For example, call if:  ? · You passed out (lost consciousness). ?Call your doctor now or seek immediate medical care if:  ? · You have new or worse belly or pelvic pain. ? · You have severe vaginal bleeding. ? · You feel dizzy or lightheaded, or you feel like you may faint. ? Watch closely for changes in your health, and be sure to contact your doctor if:  ? · You think you may be pregnant. ? · Your bleeding gets worse. ? · You do not get better as expected. Where can you learn more? Go to http://fozia-radha.info/. Enter W952 in the search box to learn more about \"Abnormal Uterine Bleeding: Care Instructions. \"  Current as of: October 13, 2016  Content Version: 11.4  © 1073-8586 [employer]. Care instructions adapted under license by [employer] (which disclaims liability or warranty for this information). If you have questions about a medical condition or this instruction, always ask your healthcare professional. [address] any warranty or liability for your use of this information.

## 2017-11-12 NOTE — ED PROVIDER NOTES
HPI Comments: 24 y.o. female with past medical history significant for anemia, PCOS, HSV-2 infection, sleep apnea, and prediabetes who presents from home with chief complaint of vaginal bleeding. Patient states that she has been having her period on and off for 11 weeks. She reports being seen by her OBGYN and was prescribed progesterone which relieved some of the bleeding. 1 week ago, patient was placed on birth control which she states has not lessened her bleeding. This morning, patient states that she woke up with her sheets \"soaked\" with blood. She currently complains of abdominal cramping  but denies any chance of pregnancy. Patient denies having any fever, chills, SOB, chest pain, nausea or vomiting. She denies being on any anticoagulation. There are no other acute medical concerns at this time. Social hx: nonsmoker, + EtOH use, no drug use  PCP: Carols Manuel Bonilla MD    Past Medical History:  1/2014+ 2/2015: Abnormal pap      Comment: LGSIL pap  No date: Anemia NEC  No date: HPV vaccine counseling      Comment: gardasil series complete   No date: HSV-2 infection  No date: PCOS (polycystic ovarian syndrome)  No date: Prediabetes  No date: Sleep disorder      Comment: sleep apnea  No date: Vitamin D deficiency  Past Surgical History:  2/2014 + 4/2015: HX COLPOSCOPY      Comment: YAHAIRA I; 2015 neg path    Note written by Ishmael Banuelos, as dictated by Vane Venegas NP 5:33 PM      The history is provided by the patient. No  was used.         Past Medical History:   Diagnosis Date    Abnormal pap 1/2014+ 2/2015    LGSIL pap    Anemia NEC     HPV vaccine counseling     gardasil series complete     HSV-2 infection     PCOS (polycystic ovarian syndrome)     Prediabetes     Sleep disorder     sleep apnea    Vitamin D deficiency        Past Surgical History:   Procedure Laterality Date    HX COLPOSCOPY  2/2014 + 4/2015    YAHAIRA I; 2015 neg path         Family History: Problem Relation Age of Onset    Heart Disease Father     Diabetes Father        Social History     Social History    Marital status: SINGLE     Spouse name: N/A    Number of children: N/A    Years of education: N/A     Occupational History    Not on file. Social History Main Topics    Smoking status: Never Smoker    Smokeless tobacco: Never Used    Alcohol use 0.0 oz/week     0 Standard drinks or equivalent per week      Comment: socially    Drug use: Not on file    Sexual activity: Yes     Partners: Male     Birth control/ protection: Condom     Other Topics Concern    Not on file     Social History Narrative         ALLERGIES: Aspirin    Review of Systems   Constitutional: Negative for appetite change, chills, diaphoresis, fatigue and fever. HENT: Negative for congestion, ear discharge, ear pain, sinus pain, sinus pressure, sore throat and trouble swallowing. Eyes: Negative for photophobia, pain, redness and visual disturbance. Respiratory: Negative for cough, chest tightness, shortness of breath and wheezing. Cardiovascular: Negative for chest pain, palpitations and leg swelling. Gastrointestinal: Positive for abdominal pain (abdominal cramping). Negative for abdominal distention, diarrhea, nausea and vomiting. Endocrine: Negative. Genitourinary: Positive for menstrual problem and vaginal bleeding. Negative for difficulty urinating, dysuria, flank pain, frequency and urgency. Musculoskeletal: Negative. Negative for back pain, neck pain and neck stiffness. Skin: Negative for color change, pallor, rash and wound. Allergic/Immunologic: Negative. Neurological: Negative. Negative for dizziness, syncope, speech difficulty, weakness and headaches. Hematological: Does not bruise/bleed easily. Psychiatric/Behavioral: Negative for behavioral problems and confusion. The patient is not nervous/anxious. All other systems reviewed and are negative.       Vitals:    11/12/17 1312   BP: 149/71   Pulse: 96   Resp: 18   Temp: 98.3 °F (36.8 °C)   SpO2: 97%   Weight: 139.7 kg (308 lb)   Height: 5' 5\" (1.651 m)            Physical Exam   Constitutional: She is oriented to person, place, and time. She appears well-developed and well-nourished. No distress. HENT:   Head: Normocephalic and atraumatic. Right Ear: External ear normal.   Left Ear: External ear normal.   Nose: Nose normal.   Mouth/Throat: Oropharynx is clear and moist.   Eyes: Conjunctivae and EOM are normal. Pupils are equal, round, and reactive to light. Right eye exhibits no discharge. Left eye exhibits no discharge. Neck: Normal range of motion. Neck supple. No JVD present. No tracheal deviation present. Cardiovascular: Normal rate, regular rhythm, normal heart sounds and intact distal pulses. Exam reveals no gallop. No murmur heard. Pulmonary/Chest: Effort normal and breath sounds normal. No respiratory distress. She has no wheezes. She has no rales. She exhibits no tenderness. Abdominal: Soft. Bowel sounds are normal. She exhibits no distension. There is tenderness (\"cramping\"). There is no rebound and no guarding. Genitourinary:   Genitourinary Comments: Negative     Musculoskeletal: Normal range of motion. She exhibits no edema or tenderness. Neurological: She is alert and oriented to person, place, and time. She has normal strength. No cranial nerve deficit. Skin: Skin is warm and dry. No rash noted. No erythema. No pallor. Psychiatric: She has a normal mood and affect. Her behavior is normal. Judgment and thought content normal.   Nursing note and vitals reviewed. Note written by Ishmael Carrasco, as dictated by Kadie Estes 5:36 PM    MDM  Number of Diagnoses or Management Options  Abnormal uterine bleeding: established and worsening  Diagnosis management comments: Plan:  Discharge to home and follow up with GYN.        Amount and/or Complexity of Data Reviewed  Clinical lab tests: ordered and reviewed  Tests in the radiology section of CPT®: ordered and reviewed      ED Course       1501: Pelvic exam complete as documented. 1524: Reviewed available labs. Hgb stable. 4:46 PM  Pt has been reexamined. Pt has no new complaints, changes or physical findings. Care plan outlined and precautions discussed. All available results were reviewed with pt. All medications were reviewed with pt. All of pt's questions and concerns were addressed. Pt agrees to F/U as instructed and agrees to return to ED upon further deterioration. Pt is ready to go home. Oxana Moss NP      Pelvic Exam  Date/Time: 11/12/2017 3:01 PM  Performed by: NP  Procedure duration:  3 minutes. Exam assisted by:  Desiree Barros RN. Type of exam performed: bimanual and speculum. External genitalia appearance: normal.    Vaginal exam:  bleeding. Bleeding: pooling  Cervical exam:  inadequately visualized. Specimen(s) collected:  GC and vaginal culture. Bimanual exam:  normal.    Patient tolerance: Patient tolerated the procedure well with no immediate complications  Comments: Large amounts vaginal pooling of blood noted.

## 2017-11-13 LAB
C TRACH DNA SPEC QL NAA+PROBE: NEGATIVE
N GONORRHOEA DNA SPEC QL NAA+PROBE: NEGATIVE
SAMPLE TYPE: NORMAL
SERVICE CMNT-IMP: NORMAL
SPECIMEN SOURCE: NORMAL

## 2018-01-19 ENCOUNTER — HOSPITAL ENCOUNTER (EMERGENCY)
Age: 28
Discharge: HOME | End: 2018-01-19
Attending: EMERGENCY MEDICINE
Payer: SELF-PAY

## 2018-01-19 VITALS
SYSTOLIC BLOOD PRESSURE: 142 MMHG | TEMPERATURE: 98.6 F | BODY MASS INDEX: 48.82 KG/M2 | RESPIRATION RATE: 18 BRPM | HEIGHT: 65 IN | DIASTOLIC BLOOD PRESSURE: 89 MMHG | OXYGEN SATURATION: 100 % | WEIGHT: 293 LBS | HEART RATE: 106 BPM

## 2018-01-19 DIAGNOSIS — J02.9 PHARYNGITIS, UNSPECIFIED ETIOLOGY: Primary | ICD-10-CM

## 2018-01-19 PROCEDURE — 74011250636 HC RX REV CODE- 250/636: Performed by: FAMILY MEDICINE

## 2018-01-19 PROCEDURE — 99283 EMERGENCY DEPT VISIT LOW MDM: CPT

## 2018-01-19 RX ORDER — DEXAMETHASONE 4 MG/1
10 TABLET ORAL
Status: COMPLETED | OUTPATIENT
Start: 2018-01-19 | End: 2018-01-19

## 2018-01-19 RX ADMIN — DEXAMETHASONE 10 MG: 4 TABLET ORAL at 12:31

## 2018-01-19 NOTE — ED PROVIDER NOTES
HPI Comments: 23 yo F diagnosed with flu on Tuesday presents with sore throat, ear fullness. Currently on tamiflu, codeine for cough. Reports worsening sore throat, difficulty swallowing. Also notes ear fullness. No fever. Patient is a 24 y.o. female presenting with sore throat. Sore Throat    Associated symptoms include trouble swallowing and cough. Pertinent negatives include no vomiting. Past Medical History:   Diagnosis Date    Abnormal pap 1/2014+ 2/2015    LGSIL pap    Anemia NEC     HPV vaccine counseling     gardasil series complete     HSV-2 infection     PCOS (polycystic ovarian syndrome)     Prediabetes     Sleep disorder     sleep apnea    Vitamin D deficiency        Past Surgical History:   Procedure Laterality Date    HX COLPOSCOPY  2/2014 + 4/2015    YAHAIRA I; 2015 neg path         Family History:   Problem Relation Age of Onset    Heart Disease Father     Diabetes Father        Social History     Social History    Marital status: SINGLE     Spouse name: N/A    Number of children: N/A    Years of education: N/A     Occupational History    Not on file. Social History Main Topics    Smoking status: Never Smoker    Smokeless tobacco: Never Used    Alcohol use 0.0 oz/week     0 Standard drinks or equivalent per week      Comment: socially    Drug use: Not on file    Sexual activity: Yes     Partners: Male     Birth control/ protection: Condom     Other Topics Concern    Not on file     Social History Narrative         ALLERGIES: Aspirin    Review of Systems   Constitutional: Negative for fever. HENT: Positive for sore throat and trouble swallowing. Ear fullness   Respiratory: Positive for cough. Cardiovascular: Negative for chest pain. Gastrointestinal: Negative for abdominal pain, nausea and vomiting. All other systems reviewed and are negative.       Vitals:    01/19/18 1128   BP: 142/89   Pulse: (!) 106   Resp: 18   Temp: 98.6 °F (37 °C)   SpO2: 100% Weight: 135.2 kg (298 lb)   Height: 5' 5\" (1.651 m)            Physical Exam   Constitutional: She is oriented to person, place, and time. She appears well-developed and well-nourished. She appears ill. HENT:   Head: Normocephalic. Right Ear: External ear normal.   Left Ear: External ear normal.   Mouth/Throat: Posterior oropharyngeal edema and posterior oropharyngeal erythema present. No oropharyngeal exudate or tonsillar abscesses. Eyes: EOM are normal. Right eye exhibits no discharge. Left eye exhibits no discharge. Neck: Normal range of motion. Neck supple. Cardiovascular: Normal rate and regular rhythm. Exam reveals no gallop and no friction rub. No murmur heard. Pulmonary/Chest: Effort normal and breath sounds normal. No respiratory distress. She has no wheezes. She has no rales. Abdominal: Soft. There is no tenderness. Lymphadenopathy:     She has cervical adenopathy. Right cervical: Superficial cervical adenopathy present. Left cervical: Superficial cervical adenopathy present. Neurological: She is alert and oriented to person, place, and time. Skin: Skin is warm and dry. Psychiatric: She has a normal mood and affect. Her behavior is normal. Judgment and thought content normal.   Nursing note and vitals reviewed. MDM  Number of Diagnoses or Management Options  Pharyngitis, unspecified etiology:   Diagnosis management comments: 23 yo F previously diagnosed with flu who presents with sore throat and ear fullness. Meets 1/4 Centor criteria (tender LAD), precluding need to test for strep.  Tonsils are swollen, otherwise normal exam.  - Decadron now       Amount and/or Complexity of Data Reviewed  Tests in the medicine section of CPT®: ordered and reviewed  Review and summarize past medical records: yes  Discuss the patient with other providers: yes    Risk of Complications, Morbidity, and/or Mortality  Presenting problems: low  Diagnostic procedures: low  Management options: low    Patient Progress  Patient progress: stable (Did not meet Centor criteria for testing. Given decadron here.  Will discharge home with supportive therapy. )    ED Course       Procedures

## 2019-02-01 ENCOUNTER — HOSPITAL ENCOUNTER (OUTPATIENT)
Dept: GENERAL RADIOLOGY | Age: 29
Discharge: HOME | End: 2019-02-01
Payer: COMMERCIAL

## 2019-02-01 DIAGNOSIS — R52 ACUTE PAIN: ICD-10-CM

## 2019-02-01 PROCEDURE — 73564 X-RAY EXAM KNEE 4 OR MORE: CPT

## 2019-07-14 ENCOUNTER — HOSPITAL ENCOUNTER (EMERGENCY)
Age: 29
Discharge: HOME | End: 2019-07-14
Attending: EMERGENCY MEDICINE
Payer: COMMERCIAL

## 2019-07-14 ENCOUNTER — APPOINTMENT (OUTPATIENT)
Dept: GENERAL RADIOLOGY | Age: 29
End: 2019-07-14
Attending: EMERGENCY MEDICINE
Payer: COMMERCIAL

## 2019-07-14 VITALS
WEIGHT: 293 LBS | TEMPERATURE: 98.8 F | HEIGHT: 65 IN | BODY MASS INDEX: 48.82 KG/M2 | SYSTOLIC BLOOD PRESSURE: 159 MMHG | RESPIRATION RATE: 20 BRPM | HEART RATE: 112 BPM | DIASTOLIC BLOOD PRESSURE: 87 MMHG | OXYGEN SATURATION: 94 %

## 2019-07-14 DIAGNOSIS — M54.50 ACUTE RIGHT-SIDED LOW BACK PAIN WITHOUT SCIATICA: ICD-10-CM

## 2019-07-14 DIAGNOSIS — S39.012A STRAIN OF LUMBAR REGION, INITIAL ENCOUNTER: Primary | ICD-10-CM

## 2019-07-14 PROCEDURE — 74011250637 HC RX REV CODE- 250/637: Performed by: EMERGENCY MEDICINE

## 2019-07-14 PROCEDURE — 96372 THER/PROPH/DIAG INJ SC/IM: CPT

## 2019-07-14 PROCEDURE — 99282 EMERGENCY DEPT VISIT SF MDM: CPT

## 2019-07-14 PROCEDURE — 74011250636 HC RX REV CODE- 250/636: Performed by: EMERGENCY MEDICINE

## 2019-07-14 PROCEDURE — 74011636637 HC RX REV CODE- 636/637: Performed by: EMERGENCY MEDICINE

## 2019-07-14 PROCEDURE — 72100 X-RAY EXAM L-S SPINE 2/3 VWS: CPT

## 2019-07-14 RX ORDER — HYDROCODONE BITARTRATE AND ACETAMINOPHEN 5; 325 MG/1; MG/1
1 TABLET ORAL
Status: COMPLETED | OUTPATIENT
Start: 2019-07-14 | End: 2019-07-14

## 2019-07-14 RX ORDER — KETOROLAC TROMETHAMINE 30 MG/ML
60 INJECTION, SOLUTION INTRAMUSCULAR; INTRAVENOUS
Status: COMPLETED | OUTPATIENT
Start: 2019-07-14 | End: 2019-07-14

## 2019-07-14 RX ORDER — CYCLOBENZAPRINE HCL 10 MG
10 TABLET ORAL
Qty: 20 TAB | Refills: 0 | Status: SHIPPED | OUTPATIENT
Start: 2019-07-14 | End: 2021-07-22

## 2019-07-14 RX ORDER — PREDNISONE 10 MG/1
TABLET ORAL
Qty: 21 TAB | Refills: 0 | Status: SHIPPED | OUTPATIENT
Start: 2019-07-14 | End: 2019-09-25

## 2019-07-14 RX ORDER — HYDROCODONE BITARTRATE AND ACETAMINOPHEN 5; 325 MG/1; MG/1
1 TABLET ORAL
Qty: 12 TAB | Refills: 0 | Status: SHIPPED | OUTPATIENT
Start: 2019-07-14 | End: 2019-07-19

## 2019-07-14 RX ORDER — PREDNISONE 20 MG/1
60 TABLET ORAL
Status: COMPLETED | OUTPATIENT
Start: 2019-07-14 | End: 2019-07-14

## 2019-07-14 RX ORDER — CYCLOBENZAPRINE HCL 10 MG
10 TABLET ORAL
Status: COMPLETED | OUTPATIENT
Start: 2019-07-14 | End: 2019-07-14

## 2019-07-14 RX ADMIN — KETOROLAC TROMETHAMINE 60 MG: 30 INJECTION, SOLUTION INTRAMUSCULAR at 22:27

## 2019-07-14 RX ADMIN — PREDNISONE 60 MG: 20 TABLET ORAL at 22:26

## 2019-07-14 RX ADMIN — CYCLOBENZAPRINE HYDROCHLORIDE 10 MG: 10 TABLET, FILM COATED ORAL at 22:27

## 2019-07-14 RX ADMIN — HYDROCODONE BITARTRATE AND ACETAMINOPHEN 1 TABLET: 5; 325 TABLET ORAL at 22:26

## 2019-07-15 NOTE — ED NOTES
Discharge instructions given to patient. Understanding verbalized. All questions answered. Patient told not to drive while on narcotic medications. Discharged home with family. Assisted to personal vehicle via wheelchair.

## 2019-07-15 NOTE — ED TRIAGE NOTES
Patient states that she has OA in her back, it has been bothering her for about a month. Today she was trying to stretch her back and she felt something pop in her lower back.   occ about 10 mins pta

## 2019-07-15 NOTE — ED PROVIDER NOTES
34 y.o. female with past medical history significant for anemia, PCOS, sleep apnea, vitamin D deficiency, and prediabetes presents ambulatory and accompanied by spouse with chief complaint of right lower back pain that radiates down her right lower extremity, currently an 8/10 in severity, onset approximately 30 minutes ago. The pt's spouse explains that the pt was bending over while stretching, where she began experiencing the abrupt onset of pain. She denies having taken any medication for pain management. Of note, the pt endorses a history of chronic back pain associated with osteoarthritis, but indicates that she has not followed up with her back specialist in [facility]. \" Pt denies being on any daily medication regimens. Pt denies any urinary symptoms, nausea, vomiting, or any other symptoms at this time. There are no other acute medical concerns at this time. Social hx: Patient denies Tobacco use. Reports social EtOH use. No reported illicit drug abuse. PCP: Other, MD Marta    Note written by Ra Diego, as dictated by Ida Ledbetter DO 10:04 PM      The history is provided by the patient and the spouse. No  was used.         Past Medical History:   Diagnosis Date    Abnormal pap 1/2014+ 2/2015    LGSIL pap    Anemia NEC     HPV vaccine counseling     gardasil series complete     HSV-2 infection     PCOS (polycystic ovarian syndrome)     Prediabetes     Sleep disorder     sleep apnea    Vitamin D deficiency        Past Surgical History:   Procedure Laterality Date    HX COLPOSCOPY  2/2014 + 4/2015    YAHAIRA I; 2015 neg path         Family History:   Problem Relation Age of Onset    Heart Disease Father     Diabetes Father        Social History     Socioeconomic History    Marital status:      Spouse name: Not on file    Number of children: Not on file    Years of education: Not on file    Highest education level: Not on file   Occupational History  Not on file   Social Needs    Financial resource strain: Not on file    Food insecurity:     Worry: Not on file     Inability: Not on file    Transportation needs:     Medical: Not on file     Non-medical: Not on file   Tobacco Use    Smoking status: Never Smoker    Smokeless tobacco: Never Used   Substance and Sexual Activity    Alcohol use: Yes     Alcohol/week: 0.0 oz     Comment: socially    Drug use: Not on file    Sexual activity: Yes     Partners: Male     Birth control/protection: Condom   Lifestyle    Physical activity:     Days per week: Not on file     Minutes per session: Not on file    Stress: Not on file   Relationships    Social connections:     Talks on phone: Not on file     Gets together: Not on file     Attends Mu-ism service: Not on file     Active member of club or organization: Not on file     Attends meetings of clubs or organizations: Not on file     Relationship status: Not on file    Intimate partner violence:     Fear of current or ex partner: Not on file     Emotionally abused: Not on file     Physically abused: Not on file     Forced sexual activity: Not on file   Other Topics Concern    Not on file   Social History Narrative    Not on file     ALLERGIES: Aspirin    Review of Systems   Constitutional: Negative for appetite change, chills, fever and unexpected weight change. HENT: Negative for ear pain, hearing loss, rhinorrhea and trouble swallowing. Eyes: Negative for pain and visual disturbance. Respiratory: Negative for cough, chest tightness and shortness of breath. Cardiovascular: Negative for chest pain and palpitations. Gastrointestinal: Negative for abdominal distention, abdominal pain, blood in stool, nausea and vomiting. Genitourinary: Negative for dysuria, hematuria and urgency. Musculoskeletal: Positive for back pain (right sided) and myalgias (RLE). Skin: Negative for rash.    Neurological: Negative for dizziness, syncope, weakness and numbness. Psychiatric/Behavioral: Negative for confusion and suicidal ideas. All other systems reviewed and are negative. Vitals:    07/14/19 2152   BP: 159/87   Pulse: (!) 112   Resp: 20   Temp: 98.8 °F (37.1 °C)   SpO2: 94%   Weight: 138.3 kg (305 lb)   Height: 5' 5\" (1.651 m)            Physical Exam   Constitutional: She is oriented to person, place, and time. She appears well-developed and well-nourished. No distress. HENT:   Head: Normocephalic and atraumatic. Right Ear: External ear normal.   Left Ear: External ear normal.   Nose: Nose normal.   Mouth/Throat: Oropharynx is clear and moist. No oropharyngeal exudate. Eyes: Pupils are equal, round, and reactive to light. Conjunctivae and EOM are normal. Right eye exhibits no discharge. Left eye exhibits no discharge. No scleral icterus. Neck: Normal range of motion. Neck supple. No JVD present. No tracheal deviation present. Cardiovascular: Normal rate, regular rhythm, normal heart sounds and intact distal pulses. Exam reveals no gallop and no friction rub. No murmur heard. Pulmonary/Chest: Effort normal and breath sounds normal. No stridor. No respiratory distress. She has no decreased breath sounds. She has no wheezes. She has no rhonchi. She has no rales. She exhibits no tenderness. Abdominal: Soft. Bowel sounds are normal. She exhibits no distension. There is no tenderness. There is no rebound and no guarding. Musculoskeletal: Normal range of motion. She exhibits no edema or tenderness. Diffuse right sided lumbar paraspinal tenderness present. Neurological: She is alert and oriented to person, place, and time. She has normal strength and normal reflexes. She displays normal reflexes. No cranial nerve deficit or sensory deficit. She exhibits normal muscle tone. Coordination normal. GCS eye subscore is 4. GCS verbal subscore is 5. GCS motor subscore is 6. Skin: Skin is warm and dry. No rash noted. She is not diaphoretic.  No erythema. No pallor. Psychiatric: She has a normal mood and affect. Her behavior is normal. Judgment and thought content normal.   Nursing note and vitals reviewed. Note written by Alexander Simms, as dictated by Radha Gonzalez DO 10:04 PM     MDM  Number of Diagnoses or Management Options  Acute right-sided low back pain without sciatica:   Strain of lumbar region, initial encounter:      Amount and/or Complexity of Data Reviewed  Tests in the radiology section of CPT®: ordered and reviewed    Risk of Complications, Morbidity, and/or Mortality  Presenting problems: moderate  Diagnostic procedures: low  Management options: moderate    Patient Progress  Patient progress: stable       Procedures    Chief Complaint   Patient presents with    Back Pain       The patient's presenting problems have been discussed, and they are in agreement with the care plan formulated and outlined with them. I have encouraged them to ask questions as they arise throughout their visit. MEDICATIONS GIVEN:  Medications   ketorolac tromethamine (TORADOL) 60 mg/2 mL injection 60 mg (60 mg IntraMUSCular Given 7/14/19 2227)   HYDROcodone-acetaminophen (NORCO) 5-325 mg per tablet 1 Tab (1 Tab Oral Given 7/14/19 2226)   predniSONE (DELTASONE) tablet 60 mg (60 mg Oral Given 7/14/19 2226)   cyclobenzaprine (FLEXERIL) tablet 10 mg (10 mg Oral Given 7/14/19 2227)       LABS REVIEWED:  No results found for this or any previous visit (from the past 24 hour(s)). VITAL SIGNS:  Patient Vitals for the past 24 hrs:   Temp Pulse Resp BP SpO2   07/14/19 2152 98.8 °F (37.1 °C) (!) 112 20 159/87 94 %       RADIOLOGY RESULTS:  The following have been ordered and reviewed:  Xr Spine Lumb 2 Or 3 V    Result Date: 7/14/2019  EXAM: XR SPINE LUMB 2 OR 3 V INDICATION: Low back pain and popping today. Chronic low back pain. TECHNIQUE: 4 images of AP, lateral, and coned-down lateral views of the lumbar spine.  COMPARISON: Lumbar spine views on 11/9/2017. FINDINGS: Limited by large body habitus. Right curvature of the lumbar spine is new. Grade 1 anterolisthesis of L5-S1 is unchanged. Degenerative disc disease at L4-5 and L5-S1 is new. Facet joints are not well evaluated due to body habitus. No acute fracture or compression deformity. IMPRESSION: Limited study. No evidence of fracture. Degenerative disc disease L5-S1. Unchanged grade 1 anterolisthesis L5-S1. PROGRESS NOTES:  Discussed results and plan with patient. Patient will be discharged home with PCP and ortho spine follow up. Patient instructed to return to the emergency room for any worsening symptoms or any other concerns. DIAGNOSIS:    1. Strain of lumbar region, initial encounter    2. Acute right-sided low back pain without sciatica        PLAN:  Follow-up Information     Follow up With Specialties Details Why Contact Info    Other, MD Marta  Schedule an appointment as soon as possible for a visit  Patient can only remember the practice name and not the physician      Adrian Roper MD Orthopedic Surgery Schedule an appointment as soon as possible for a visit  [address] [facility] [address]  [facility] [phone]      [facility] EMERGENCY DEPT Emergency Medicine  If symptoms worsen [address] [place] [address]  [phone]        Discharge Medication List as of 7/14/2019 10:53 PM      START taking these medications    Details   HYDROcodone-acetaminophen (NORCO) 5-325 mg per tablet Take 1 Tab by mouth every six (6) hours as needed for Pain for up to 5 days. Max Daily Amount: 4 Tabs., Print, Disp-12 Tab, R-0      cyclobenzaprine (FLEXERIL) 10 mg tablet Take 1 Tab by mouth three (3) times daily as needed for Muscle Spasm(s). , Print, Disp-20 Tab, R-0      predniSONE (STERAPRED DS) 10 mg dose pack Take as directed. , Print, Disp-21 Tab, R-0         CONTINUE these medications which have NOT CHANGED    Details   medroxyPROGESTERone (PROVERA) 10 mg tablet Take 10 mg by mouth daily. , Historical Med      norethindrone-ethinyl estradiol (JUNEL 1/20, 21,) 1-20 mg-mcg tab Take  by mouth., Historical Med      sertraline (ZOLOFT) 50 mg tablet Take 50 mg by mouth daily. , Historical Med      metFORMIN (GLUCOPHAGE) 500 mg tablet Take 500 mg by mouth daily. , Historical Med      OTHER Birth control pills, Historical Med      valACYclovir (VALTREX) 500 mg tablet Take two po for 3 days, Normal, Disp-6 Tab, R-6         STOP taking these medications       naltrexone (DEPADE) 50 mg tablet Comments:   Reason for Stopping:         meloxicam (MOBIC) 15 mg tablet Comments:   Reason for Stopping:               ED COURSE: The patient's hospital course has been uncomplicated.

## 2019-07-15 NOTE — DISCHARGE INSTRUCTIONS
We hope that we have addressed all of your medical concerns. The examination and treatment you received in the Emergency Department were for an emergent problem and were not intended as complete care. It is important that you follow up with your healthcare provider(s) for ongoing care. If your symptoms worsen or do not improve as expected, and you are unable to reach your usual health care provider(s), you should return to the Emergency Department. Today's healthcare is undergoing tremendous change, and patient satisfaction surveys are one of the many tools to assess the quality of medical care. You may receive a survey from the [employer] regarding your experience in the Emergency Department. I hope that your experience has been completely positive, particularly the medical care that I provided. As such, please participate in the survey; anything less than excellent does not meet my expectations or intentions. [facility] and [address] [place] [address] participate in nationally recognized quality of care measures. If your blood pressure is greater than 120/80, as reported below, we urge that you seek medical care to address the potential of high blood pressure, commonly known as hypertension. Hypertension can be hereditary or can be caused by certain medical conditions, pain, stress, or \"white coat syndrome. \"       Please make an appointment with your health care provider(s) for follow up of your Emergency Department visit. VITALS:   Patient Vitals for the past 8 hrs:   Temp Pulse Resp BP SpO2   07/14/19 2152 98.8 °F (37.1 °C) (!) 112 20 159/87 94 %          Thank you for allowing us to provide you with medical care today. We realize that you have many choices for your emergency care needs. Please choose us in the future for any continued health care needs. Sandra Wilkinson, [facility]. Office: [phone]            No results found for this or any previous visit (from the past 24 hour(s)). Xr Spine Lumb 2 Or 3 V    Result Date: 7/14/2019  EXAM: XR SPINE LUMB 2 OR 3 V INDICATION: Low back pain and popping today. Chronic low back pain. TECHNIQUE: 4 images of AP, lateral, and coned-down lateral views of the lumbar spine. COMPARISON: Lumbar spine views on 11/9/2017. FINDINGS: Limited by large body habitus. Right curvature of the lumbar spine is new. Grade 1 anterolisthesis of L5-S1 is unchanged. Degenerative disc disease at L4-5 and L5-S1 is new. Facet joints are not well evaluated due to body habitus. No acute fracture or compression deformity. IMPRESSION: Limited study. No evidence of fracture. Degenerative disc disease L5-S1. Unchanged grade 1 anterolisthesis L5-S1. Patient Education        Back Pain: Care Instructions  Your Care Instructions    Back pain has many possible causes. It is often related to problems with muscles and ligaments of the back. It may also be related to problems with the nerves, discs, or bones of the back. Moving, lifting, standing, sitting, or sleeping in an awkward way can strain the back. Sometimes you don't notice the injury until later. Arthritis is another common cause of back pain. Although it may hurt a lot, back pain usually improves on its own within several weeks. Most people recover in 12 weeks or less. Using good home treatment and being careful not to stress your back can help you feel better sooner. Follow-up care is a key part of your treatment and safety. Be sure to make and go to all appointments, and call your doctor if you are having problems. It's also a good idea to know your test results and keep a list of the medicines you take. How can you care for yourself at home? · Sit or lie in positions that are most comfortable and reduce your pain. Try one of these positions when you lie down:  ?  Lie on your back with your knees bent and supported by large pillows. ? Lie on the floor with your legs on the seat of a sofa or chair. ? Lie on your side with your knees and hips bent and a pillow between your legs. ? Lie on your stomach if it does not make pain worse. · Do not sit up in bed, and avoid soft couches and twisted positions. Bed rest can help relieve pain at first, but it delays healing. Avoid bed rest after the first day of back pain. · Change positions every 30 minutes. If you must sit for long periods of time, take breaks from sitting. Get up and walk around, or lie in a comfortable position. · Try using a heating pad on a low or medium setting for 15 to 20 minutes every 2 or 3 hours. Try a warm shower in place of one session with the heating pad. · You can also try an ice pack for 10 to 15 minutes every 2 to 3 hours. Put a thin cloth between the ice pack and your skin. · Take pain medicines exactly as directed. ? If the doctor gave you a prescription medicine for pain, take it as prescribed. ? If you are not taking a prescription pain medicine, ask your doctor if you can take an over-the-counter medicine. · Take short walks several times a day. You can start with 5 to 10 minutes, 3 or 4 times a day, and work up to longer walks. Walk on level surfaces and avoid hills and stairs until your back is better. · Return to work and other activities as soon as you can. Continued rest without activity is usually not good for your back. · To prevent future back pain, do exercises to stretch and strengthen your back and stomach. Learn how to use good posture, safe lifting techniques, and proper body mechanics. When should you call for help? Call your doctor now or seek immediate medical care if:    · You have new or worsening numbness in your legs.     · You have new or worsening weakness in your legs.  (This could make it hard to stand up.)     · You lose control of your bladder or bowels.    Watch closely for changes in your health, and be sure to contact your doctor if:    · You have a fever, lose weight, or don't feel well.     · You do not get better as expected. Where can you learn more? Go to http://fozia-radha.info/. Enter H646 in the search box to learn more about \"Back Pain: Care Instructions. \"  Current as of: September 20, 2018  Content Version: 11.9  © 6407-8247 [employer]. Care instructions adapted under license by [employer] (which disclaims liability or warranty for this information). If you have questions about a medical condition or this instruction, always ask your healthcare professional. [address] any warranty or liability for your use of this information. Patient Education        Low Back Pain: Exercises  Your Care Instructions  Here are some examples of typical rehabilitation exercises for your condition. Start each exercise slowly. Ease off the exercise if you start to have pain. Your doctor or physical therapist will tell you when you can start these exercises and which ones will work best for you. How to do the exercises  Press-up    1. Lie on your stomach, supporting your body with your forearms. 2. Press your elbows down into the floor to raise your upper back. As you do this, relax your stomach muscles and allow your back to arch without using your back muscles. As your press up, do not let your hips or pelvis come off the floor. 3. Hold for 15 to 30 seconds, then relax. 4. Repeat 2 to 4 times. Alternate arm and leg (bird dog) exercise    1. Start on the floor, on your hands and knees. 2. Tighten your belly muscles. 3. Raise one leg off the floor, and hold it straight out behind you. Be careful not to let your hip drop down, because that will twist your trunk. 4. Hold for about 6 seconds, then lower your leg and switch to the other leg. 5. Repeat 8 to 12 times on each leg.   6. Over time, work up to holding for 10 to 30 seconds each time. 7. If you feel stable and secure with your leg raised, try raising the opposite arm straight out in front of you at the same time. Knee-to-chest exercise    1. Lie on your back with your knees bent and your feet flat on the floor. 2. Bring one knee to your chest, keeping the other foot flat on the floor (or keeping the other leg straight, whichever feels better on your lower back). 3. Keep your lower back pressed to the floor. Hold for at least 15 to 30 seconds. 4. Relax, and lower the knee to the starting position. 5. Repeat with the other leg. Repeat 2 to 4 times with each leg. 6. To get more stretch, put your other leg flat on the floor while pulling your knee to your chest.    Curl-ups    1. Lie on the floor on your back with your knees bent at a 90-degree angle. Your feet should be flat on the floor, about 12 inches from your buttocks. 2. Cross your arms over your chest. If this bothers your neck, try putting your hands behind your neck (not your head), with your elbows spread apart. 3. Slowly tighten your belly muscles and raise your shoulder blades off the floor. 4. Keep your head in line with your body, and do not press your chin to your chest.  5. Hold this position for 1 or 2 seconds, then slowly lower yourself back down to the floor. 6. Repeat 8 to 12 times. Pelvic tilt exercise    1. Lie on your back with your knees bent. 2. \"Brace\" your stomach. This means to tighten your muscles by pulling in and imagining your belly button moving toward your spine. You should feel like your back is pressing to the floor and your hips and pelvis are rocking back. 3. Hold for about 6 seconds while you breathe smoothly. 4. Repeat 8 to 12 times. Heel dig bridging    1. Lie on your back with both knees bent and your ankles bent so that only your heels are digging into the floor. Your knees should be bent about 90 degrees.   2. Then push your heels into the floor, squeeze your buttocks, and lift your hips off the floor until your shoulders, hips, and knees are all in a straight line. 3. Hold for about 6 seconds as you continue to breathe normally, and then slowly lower your hips back down to the floor and rest for up to 10 seconds. 4. Do 8 to 12 repetitions. Hamstring stretch in doorway    1. Lie on your back in a doorway, with one leg through the open door. 2. Slide your leg up the wall to straighten your knee. You should feel a gentle stretch down the back of your leg. 3. Hold the stretch for at least 15 to 30 seconds. Do not arch your back, point your toes, or bend either knee. Keep one heel touching the floor and the other heel touching the wall. 4. Repeat with your other leg. 5. Do 2 to 4 times for each leg. Hip flexor stretch    1. Kneel on the floor with one knee bent and one leg behind you. Place your forward knee over your foot. Keep your other knee touching the floor. 2. Slowly push your hips forward until you feel a stretch in the upper thigh of your rear leg. 3. Hold the stretch for at least 15 to 30 seconds. Repeat with your other leg. 4. Do 2 to 4 times on each side. Wall sit    1. Stand with your back 10 to 12 inches away from a wall. 2. Lean into the wall until your back is flat against it. 3. Slowly slide down until your knees are slightly bent, pressing your lower back into the wall. 4. Hold for about 6 seconds, then slide back up the wall. 5. Repeat 8 to 12 times. Follow-up care is a key part of your treatment and safety. Be sure to make and go to all appointments, and call your doctor if you are having problems. It's also a good idea to know your test results and keep a list of the medicines you take. Where can you learn more? Go to http://fozia-radha.info/. Enter R474 in the search box to learn more about \"Low Back Pain: Exercises. \"  Current as of: September 20, 2018  Content Version: 11.9  © 0006-7188 [employer]. Care instructions adapted under license by [employer] (which disclaims liability or warranty for this information). If you have questions about a medical condition or this instruction, always ask your healthcare professional. [address] any warranty or liability for your use of this information.

## 2019-09-11 ENCOUNTER — OFFICE VISIT (OUTPATIENT)
Dept: FAMILY MEDICINE CLINIC | Age: 29
End: 2019-09-11

## 2019-09-11 VITALS
OXYGEN SATURATION: 97 % | TEMPERATURE: 98.2 F | WEIGHT: 293 LBS | BODY MASS INDEX: 48.82 KG/M2 | SYSTOLIC BLOOD PRESSURE: 144 MMHG | DIASTOLIC BLOOD PRESSURE: 74 MMHG | RESPIRATION RATE: 18 BRPM | HEART RATE: 101 BPM | HEIGHT: 65 IN

## 2019-09-11 DIAGNOSIS — M35.7 HYPERMOBILITY SYNDROME: ICD-10-CM

## 2019-09-11 DIAGNOSIS — G47.33 OSA (OBSTRUCTIVE SLEEP APNEA): ICD-10-CM

## 2019-09-11 DIAGNOSIS — M54.41 CHRONIC BILATERAL LOW BACK PAIN WITH RIGHT-SIDED SCIATICA: ICD-10-CM

## 2019-09-11 DIAGNOSIS — R73.01 IFG (IMPAIRED FASTING GLUCOSE): ICD-10-CM

## 2019-09-11 DIAGNOSIS — E28.2 PCOS (POLYCYSTIC OVARIAN SYNDROME): ICD-10-CM

## 2019-09-11 DIAGNOSIS — E55.9 VITAMIN D DEFICIENCY: ICD-10-CM

## 2019-09-11 DIAGNOSIS — G89.29 CHRONIC BILATERAL LOW BACK PAIN WITH RIGHT-SIDED SCIATICA: ICD-10-CM

## 2019-09-11 DIAGNOSIS — N92.6 IRREGULAR MENSTRUAL BLEEDING: ICD-10-CM

## 2019-09-11 DIAGNOSIS — M43.16 SPONDYLOLISTHESIS OF LUMBAR REGION: ICD-10-CM

## 2019-09-11 DIAGNOSIS — Z76.89 ESTABLISHING CARE WITH NEW DOCTOR, ENCOUNTER FOR: ICD-10-CM

## 2019-09-11 DIAGNOSIS — R03.0 ELEVATED BLOOD PRESSURE READING IN OFFICE WITHOUT DIAGNOSIS OF HYPERTENSION: Primary | ICD-10-CM

## 2019-09-11 DIAGNOSIS — E66.01 OBESITY, MORBID (HCC): ICD-10-CM

## 2019-09-11 DIAGNOSIS — F41.8 DEPRESSION WITH ANXIETY: ICD-10-CM

## 2019-09-11 DIAGNOSIS — G44.89 OTHER HEADACHE SYNDROME: ICD-10-CM

## 2019-09-11 DIAGNOSIS — D50.9 IRON DEFICIENCY ANEMIA, UNSPECIFIED IRON DEFICIENCY ANEMIA TYPE: ICD-10-CM

## 2019-09-11 RX ORDER — CYCLOBENZAPRINE HCL 10 MG
10 TABLET ORAL
COMMUNITY
Start: 2019-07-18 | End: 2020-04-16 | Stop reason: SDUPTHER

## 2019-09-11 RX ORDER — MEDROXYPROGESTERONE ACETATE 10 MG/1
TABLET ORAL
COMMUNITY
End: 2020-04-16

## 2019-09-11 RX ORDER — MELOXICAM 15 MG/1
TABLET ORAL
COMMUNITY
Start: 2019-07-27 | End: 2020-04-16

## 2019-09-11 RX ORDER — TRAZODONE HYDROCHLORIDE 100 MG/1
100 TABLET ORAL
COMMUNITY
End: 2020-07-13 | Stop reason: SDUPTHER

## 2019-09-11 RX ORDER — ESCITALOPRAM OXALATE 20 MG/1
20 TABLET ORAL
COMMUNITY
End: 2019-11-18 | Stop reason: SDUPTHER

## 2019-09-11 RX ORDER — METFORMIN HYDROCHLORIDE 500 MG/1
TABLET ORAL 2 TIMES DAILY WITH MEALS
COMMUNITY
End: 2019-09-25

## 2019-09-11 NOTE — PROGRESS NOTES
Chief Complaint   Patient presents with   [facility] Establish Care     1. Have you been to the ER, urgent care clinic since your last visit? Hospitalized since your last visit? Yes [facility] 07/19, Back pain. 2. Have you seen or consulted any other health care providers outside of the [address] [place] [address] since your last visit? Include any pap smears or colon screening.  No

## 2019-09-11 NOTE — PATIENT INSTRUCTIONS
Here today to establish care  Elevated blood pressure reading here and she reports she has had elevated blood pressure readings at other doctor's offices, I suspect she may have some aspects of underlying hypertension we will recheck this in 2 weeks and we will go ahead and grab labs  She has a history of PCOS, she is currently trying to conceive and she is on metformin so I have encouraged her to follow a healthy diet, continue on her weight loss journey, continue with her metformin and we will check labs again  Depression with anxiety is stable currently on her current meds which were previously prescribed by psychiatry and I have told her I will be happy to continue prescribing these meds  She has a history of iron deficiency anemia, this may have been due to heavy menstrual periods, we will recheck labs  She did previously need to be seen by hematology because she required IV iron infusions because she did not seem to respond appropriately to oral iron  History of spondylolisthesis being managed by Dr. Apryl Mishra and also physical therapy over at [facility], this is associated with right-sided sciatica and she did have a positive straight leg test today, will continue to monitor this  Encouraged her with weight loss because I believe this will improve her low back pain and her sciatica  She has obstructive sleep apnea, she does have a CPAP at home but it needs to be updated and so she is not able to use her right now, she is waking up with headaches which I believe is probably related to untreated LEEP apnea causing hypoxia, I have given her a referral to the sleep medicine team here at [facility] so that we can get her squared away  I will see her in 2 weeks, she will go for fasting labs today, call with questions or concerns

## 2019-09-11 NOTE — PROGRESS NOTES
Family Medicine Initial Office Visit  Patient: Jojo Guzman  1990, 34 y.o., female  Encounter Date: 9/11/2019    ASSESSMENT & PLAN    ICD-10-CM ICD-9-CM    1. Elevated blood pressure reading in office without diagnosis of hypertension X87.6 485.3 METABOLIC PANEL, COMPREHENSIVE      TSH 3RD GENERATION   2. PCOS (polycystic ovarian syndrome) E28.2 256.4 TSH 3RD GENERATION   3. IFG (impaired fasting glucose) R73.01 790.21 CBC W/O DIFF      HEMOGLOBIN A1C WITH EAG      LIPID PANEL      METABOLIC PANEL, COMPREHENSIVE      TSH 3RD GENERATION   4. Obesity, morbid ([address] E66.01 278.01    5. Depression with anxiety F41.8 300.4 CBC W/O DIFF      METABOLIC PANEL, COMPREHENSIVE      TSH 3RD GENERATION   6. Iron deficiency anemia, unspecified iron deficiency anemia type D50.9 280.9 CBC W/O DIFF      IRON PROFILE      FERRITIN   7. Establishing care with new doctor, encounter for Z76.89 V65.8    8. Irregular menstrual bleeding N92.6 626.4 CBC W/O DIFF      TSH 3RD GENERATION   9. Spondylolisthesis of lumbar region M43.16 738.4    10. Chronic bilateral low back pain with right-sided sciatica M54.41 724.2     G89.29 724.3      338.29    11. Hypermobility syndrome M35.7 728.5    12. CHRISTIAN (obstructive sleep apnea) G47.33 327.23 REFERRAL TO SLEEP STUDIES   13. Other headache syndrome G44.89 339.89    14. Vitamin D deficiency E55.9 268.9 VITAMIN D, 25 HYDROXY     Orders Placed This Encounter    CBC W/O DIFF    HEMOGLOBIN A1C WITH EAG    LIPID PANEL    METABOLIC PANEL, COMPREHENSIVE    TSH 3RD GENERATION    VITAMIN D, 25 HYDROXY    IRON PROFILE    FERRITIN    REFERRAL TO SLEEP STUDIES     Referral Priority:   Routine     Referral Type:   Consultation     Referral Reason:   Specialty Services Required     Referred to Provider:   Pernell Suarez MD     Requested Specialty:   Sleep Medicine     Number of Visits Requested:   1    cyclobenzaprine (FLEXERIL) 10 mg tablet     Sig: Take 10 mg by mouth.     meloxicam (MOBIC) 15 mg tablet    escitalopram oxalate (LEXAPRO) 20 mg tablet     Sig: Take 20 mg by mouth. Takes 2 tablets daily to equal 40mg.  traZODone (DESYREL) 100 mg tablet     Sig: Take 100 mg by mouth nightly. Patient takes 2 tablets at bedtime to equal 200 mg.    metFORMIN (GLUCOPHAGE) 500 mg tablet     Sig: Take  by mouth two (2) times daily (with meals). Patient takes 3 tablets at lunch time to equal 1500 mg.    medroxyPROGESTERone (PROVERA) 10 mg tablet     Sig: Take  by mouth every twenty-eight (28) days. Takes every 28 days for 10 days. No problem-specific Assessment & Plan notes found for this encounter.     Patient Instructions   Here today to establish care  Elevated blood pressure reading here and she reports she has had elevated blood pressure readings at other doctor's offices, I suspect she may have some aspects of underlying hypertension we will recheck this in 2 weeks and we will go ahead and grab labs  She has a history of PCOS, she is currently trying to conceive and she is on metformin so I have encouraged her to follow a healthy diet, continue on her weight loss journey, continue with her metformin and we will check labs again  Depression with anxiety is stable currently on her current meds which were previously prescribed by psychiatry and I have told her I will be happy to continue prescribing these meds  She has a history of iron deficiency anemia, this may have been due to heavy menstrual periods, we will recheck labs  She did previously need to be seen by hematology because she required IV iron infusions because she did not seem to respond appropriately to oral iron  History of spondylolisthesis being managed by Dr. Dianna Figueroa and also physical therapy over at [facility], this is associated with right-sided sciatica and she did have a positive straight leg test today, will continue to monitor this  Encouraged her with weight loss because I believe this will improve her low back pain and her sciatica  She has obstructive sleep apnea, she does have a CPAP at home but it needs to be updated and so she is not able to use her right now, she is waking up with headaches which I believe is probably related to untreated LEEP apnea causing hypoxia, I have given her a referral to the sleep medicine team here at [facility] so that we can get her squared away  I will see her in 2 weeks, she will go for fasting labs today, call with questions or concerns      CHIEF COMPLAINT  Chief Complaint   Patient presents with   [address] [place] [address] Street is a 34 y.o. female presenting today for establishing care. Patient works as works from home with [employer]  Patient lives in a house with  and takes care of brother. Patient was last seen by primary care \"its been a little while\" she reports, maybe 9-10 months ago, was following with BRITTNEY Sandoval in [place] [address]. ([facility], [facility] primary care.)  Patient last saw a dentist April 2019  Patient last had eye exam has an appt today --planning to see Dr Kathy Soto ([facility])    Exercise: not regularly, no dedicated cardio but she is participating in PT  Diet / Weight:not strictly following a diet, weight fluctuates, she reports this is her highest weight ever  Body mass index is 54.65 kg/m². She reports her weight is usually 315 to 320. Uses food for comfort when feeling depressed. She reports \"I havent been put on any strict diet. \"    Tobacco:no  EtOH:occassional use, socially 2-3 times monthly  Illicit Substances: no    Sexual Activity: yes one male partner    Patient has chronic back pain, was previously seeing Dr Jolly Najera and because she did not want to pursue surgery she was recommended to establish with primary care. Was following with Dr Stacey Pitt with [facility] she reports and is now without care per her report  Depression and anxiety, was out of work for almost 2 years because of her mood troubles.  She is seeing a therapist now [facility]an Councilman with [facility] she is on lexapro 40 and trazodone 100 qhs. On Metformin takes 1000 once daily and 500 once daily (breakfast and dinner usually)    Hx of sleep apnea--diagnosed around age 23. She reports \"I do have a machine but I haven't seen my sleep apnea doctor in a few months\" not using CPAP at this time (she had lost her insurance)    Has irregular menstrual bleeding and takes provera monthly. She is followed by OBGYN--Dr Turcios--had an IUD and took it out this year in January because she is TTC    She reports she's having issues with having headache from laying down, she wonders if this is related to her sleep apnea    She is morbidly obese, has been gaining weight--she intends to sign up for the gym and is hoping to go back to see a nutritionist that she had previously to see if she can get kick start    She reports \"the last few doctors I've seen have told me my pressures are high\"    Has PCOS  Review of Systems  A 12 point review of systems was negative except as noted here or in the HPI.  + headache L parietal, throbbing, woke up with it, did not take anything for it    OBJECTIVE  Visit Vitals  /74 (BP 1 Location: Left arm, BP Patient Position: Sitting)   Pulse (!) 101   Temp 98.2 °F (36.8 °C) (Oral)   Resp 18   Ht 5' 5\" (1.651 m)   Wt 328 lb 6.4 oz (149 kg)   LMP 07/17/2019   SpO2 97%   BMI 54.65 kg/m²       Physical Exam   Constitutional: She is oriented to person, place, and time. She appears well-developed and well-nourished. No distress. NAD, Nontoxic, Appears Stated Age, morbidly obese   HENT:   Head: Normocephalic and atraumatic. Mouth/Throat: Oropharynx is clear and moist.   Eyes: Conjunctivae and EOM are normal. Right eye exhibits no discharge. Left eye exhibits no discharge. No scleral icterus. Neck: Neck supple. No thyromegaly present. Cardiovascular: Normal rate, regular rhythm and normal heart sounds.  Exam reveals no gallop and no friction rub. No murmur heard. Pulmonary/Chest: Effort normal and breath sounds normal. No stridor. No respiratory distress. She has no wheezes. She has no rales. Abdominal: Soft. Bowel sounds are normal. She exhibits no distension. There is no tenderness. There is no rebound and no guarding. Large pannus   Musculoskeletal: She exhibits edema (Trace bilateral to mid shin). She exhibits no tenderness. Right positive straight leg test   Lymphadenopathy:     She has no cervical adenopathy. Neurological: She is alert and oriented to person, place, and time. Grossly intact CN   Skin: Skin is warm and dry. No rash noted. She is not diaphoretic. Acanthosis nigricans neck   Psychiatric: She has a normal mood and affect. Her behavior is normal.   Nursing note and vitals reviewed. No results found for any visits on 09/11/19. HISTORICAL  Reviewed and updated today, and as noted below:    Past Medical History:   Diagnosis Date    Chronic pain     Depression     Diabetes ([address]      History reviewed. No pertinent surgical history. Family History   Problem Relation Age of Onset    No Known Problems Mother     Heart Disease Father      Social History     Tobacco Use   Smoking Status Never Smoker   Smokeless Tobacco Never Used     Social History     Socioeconomic History    Marital status:      Spouse name: Not on file    Number of children: Not on file    Years of education: Not on file    Highest education level: Not on file   Tobacco Use    Smoking status: Never Smoker    Smokeless tobacco: Never Used     Not on File    No results found for any previous visit. Ada Motta MD  [facility]  09/11/19 10:36 AM    Portions of this note may have been populated using smart dictation software and may have \"sounds-like\" errors present.

## 2019-09-12 LAB
25(OH)D3+25(OH)D2 SERPL-MCNC: 12.6 NG/ML (ref 30–100)
ALBUMIN SERPL-MCNC: 3.9 G/DL (ref 3.5–5.5)
ALBUMIN/GLOB SERPL: 1.2 {RATIO} (ref 1.2–2.2)
ALP SERPL-CCNC: 100 IU/L (ref 39–117)
ALT SERPL-CCNC: 12 IU/L (ref 0–32)
AST SERPL-CCNC: 9 IU/L (ref 0–40)
BILIRUB SERPL-MCNC: 0.3 MG/DL (ref 0–1.2)
BUN SERPL-MCNC: 9 MG/DL (ref 6–20)
BUN/CREAT SERPL: 13 (ref 9–23)
CALCIUM SERPL-MCNC: 9.4 MG/DL (ref 8.7–10.2)
CHLORIDE SERPL-SCNC: 98 MMOL/L (ref 96–106)
CHOLEST SERPL-MCNC: 145 MG/DL (ref 100–199)
CO2 SERPL-SCNC: 26 MMOL/L (ref 20–29)
CREAT SERPL-MCNC: 0.67 MG/DL (ref 0.57–1)
ERYTHROCYTE [DISTWIDTH] IN BLOOD BY AUTOMATED COUNT: 13.4 % (ref 12.3–15.4)
EST. AVERAGE GLUCOSE BLD GHB EST-MCNC: 169 MG/DL
FERRITIN SERPL-MCNC: 111 NG/ML (ref 15–150)
GLOBULIN SER CALC-MCNC: 3.2 G/DL (ref 1.5–4.5)
GLUCOSE SERPL-MCNC: 162 MG/DL (ref 65–99)
HBA1C MFR BLD: 7.5 % (ref 4.8–5.6)
HCT VFR BLD AUTO: 36.2 % (ref 34–46.6)
HDLC SERPL-MCNC: 48 MG/DL
HGB BLD-MCNC: 12 G/DL (ref 11.1–15.9)
INTERPRETATION, 910389: NORMAL
IRON SATN MFR SERPL: 16 % (ref 15–55)
IRON SERPL-MCNC: 53 UG/DL (ref 27–159)
LDLC SERPL CALC-MCNC: 83 MG/DL (ref 0–99)
Lab: NORMAL
MCH RBC QN AUTO: 28.5 PG (ref 26.6–33)
MCHC RBC AUTO-ENTMCNC: 33.1 G/DL (ref 31.5–35.7)
MCV RBC AUTO: 86 FL (ref 79–97)
PLATELET # BLD AUTO: 334 X10E3/UL (ref 150–450)
POTASSIUM SERPL-SCNC: 4.6 MMOL/L (ref 3.5–5.2)
PROT SERPL-MCNC: 7.1 G/DL (ref 6–8.5)
RBC # BLD AUTO: 4.21 X10E6/UL (ref 3.77–5.28)
SODIUM SERPL-SCNC: 140 MMOL/L (ref 134–144)
TIBC SERPL-MCNC: 337 UG/DL (ref 250–450)
TRIGL SERPL-MCNC: 69 MG/DL (ref 0–149)
TSH SERPL DL<=0.005 MIU/L-ACNC: 1.56 UIU/ML (ref 0.45–4.5)
UIBC SERPL-MCNC: 284 UG/DL (ref 131–425)
VLDLC SERPL CALC-MCNC: 14 MG/DL (ref 5–40)
WBC # BLD AUTO: 10.7 X10E3/UL (ref 3.4–10.8)

## 2019-09-12 NOTE — PROGRESS NOTES
Please make a follow up appointment in the next two weeks  Labs reviewed  Uncontrolled diabetes noted on labs (A1c threshold for diabetes is 6.5, yours is 7.5)--we need to optimize treatment with more than metformin at this time  Cholesterol looks great  Fasting sugar was 162 (threshold for diabetes diagnosis is 126, so this again meets criteria)  Electrolytes, kidney and liver functions otherwise look good  Vitamin D is deficient and we will need to start replacement therapy (Can address at upcoming visit)  Iron studies all normal--blood counts normal, no anemia

## 2019-09-25 ENCOUNTER — OFFICE VISIT (OUTPATIENT)
Dept: FAMILY MEDICINE CLINIC | Age: 29
End: 2019-09-25

## 2019-09-25 VITALS
HEIGHT: 65 IN | BODY MASS INDEX: 48.82 KG/M2 | WEIGHT: 293 LBS | SYSTOLIC BLOOD PRESSURE: 134 MMHG | TEMPERATURE: 98.1 F | DIASTOLIC BLOOD PRESSURE: 74 MMHG | RESPIRATION RATE: 18 BRPM | OXYGEN SATURATION: 100 % | HEART RATE: 99 BPM

## 2019-09-25 DIAGNOSIS — E11.9 NEW ONSET TYPE 2 DIABETES MELLITUS (HCC): Primary | ICD-10-CM

## 2019-09-25 DIAGNOSIS — Z23 ENCOUNTER FOR IMMUNIZATION: ICD-10-CM

## 2019-09-25 RX ORDER — METFORMIN HYDROCHLORIDE 500 MG/1
TABLET, EXTENDED RELEASE ORAL
Qty: 120 TAB | Refills: 2 | Status: SHIPPED | OUTPATIENT
Start: 2019-09-25 | End: 2020-01-09 | Stop reason: SDUPTHER

## 2019-09-25 RX ORDER — ERGOCALCIFEROL 1.25 MG/1
50000 CAPSULE ORAL
Qty: 14 CAP | Refills: 0 | Status: SHIPPED | OUTPATIENT
Start: 2019-09-25 | End: 2020-07-06 | Stop reason: SDUPTHER

## 2019-09-25 NOTE — PATIENT INSTRUCTIONS
Extensive discussion today on the pathophysiology of diabetes, treatment options, goals of treatment and long term implications after this diagnosis. Patient encouraged to lose weight through exercise and healthy diet  Referral to diabetic education made  Diabetic foot exam done today and normal  Vaccinated with PCV 23 and influenza today as per guidelines  We will start the patient on a metformin regimen, she is previously been on metformin immediate release and prefers to be on extended release and so I have sent this to her pharmacy  She will start with once daily with dinner, week 2 she will take once daily with breakfast and with dinner, week 3 she will take 1 with breakfast and 2 with dinner and week 4 she will take 2 with breakfast and 2 with dinner and this will be her goal dose thereafter  We discussed the potential future use of a GLP-1 inhibitor if metformin is not sufficient  Because she is exclusively on oral medications the guidelines do not dictate that she needs to start checking her blood sugar yet, and we will defer this at this time  She will need to have an A1c checked in about 3 months  She was found to be deficient in vitamin D and I have sent repletion to her pharmacy.   50,000 units weekly for the next 14 weeks and then an over-the-counter vitamin D supplement with 2000 4000 international units of vitamin D daily thereafter  Urged to continue with physical therapy and stay active    Encounter time today was >25 minutes and more than 50% of this encounter was spent in counseling face-to-face regarding Diagnosis, Patient Education, Medication Management, Compliance and Impressions as above

## 2019-09-25 NOTE — PROGRESS NOTES
Influenza Immunization/s administered on 09/25/19 by Laith Griffiths LPN with patient's consent and per order of Dr. Larissa Rodriguez M.D. Patient tolerated injection well, no complaints of pain before nor after the injection. No adverse effects noted after observing patient in office for 20 minutes.

## 2019-09-25 NOTE — PROGRESS NOTES
Family Medicine Follow-Up Progress Note  Patient: Josemanuel Cullen  1990, 34 y.o., female  Encounter Date: 2019    ASSESSMENT & PLAN    ICD-10-CM ICD-9-CM    1. [facility] onset type 2 [facility] mellitus [facility] E11.9 250.00 REFERRAL TO DIABETIC EDUCATION       DIABETES FOOT EXAM      HEMOGLOBIN A1C WITH EAG   2. Encounter for immunization Z23 V03.89 MS IMMUNIZ ADMIN,1 SINGLE/COMB VAC/TOXOID      INFLUENZA VIRUS VAC QUAD,SPLIT,PRESV FREE SYRINGE IM      PNEUMOCOCCAL POLYSACCHARIDE VACCINE, 23-VALENT, ADULT OR IMMUNOSUPPRESSED PT DOSE,      MS IMMUNIZ ADMIN,1 SINGLE/COMB VAC/TOXOID      CANCELED: INFLUENZA VIRUS VAC QUAD,SPLIT,PRESV FREE SYRINGE IM      CANCELED: PNEUMOCOCCAL POLYSACCHARIDE VACCINE, 23-VALENT, ADULT OR IMMUNOSUPPRESSED PT DOSE,       Orders Placed This Encounter    Influenza virus vaccine (QUADRIVALENT PRES FREE SYRINGE) IM ([id number])    Pneumococcal polysaccharide vaccine, 23-valent, adult or immunosuppressed pt dose    HEMOGLOBIN A1C WITH EAG     Standing Status:   Future     Standing Expiration Date:   3/25/2020    REFERRAL TO DIABETIC EDUCATION     Referral Priority:   Routine     Referral Type:   Consultation     Referral Reason:   Specialty Services Required     Number of Visits Requested:   1     DIABETES FOOT EXAM    MS IMMUNIZ ADMIN,1 SINGLE/COMB VAC/TOXOID    MS IMMUNIZ ADMIN,1 SINGLE/COMB VAC/TOXOID    ergocalciferol (ERGOCALCIFEROL) 50,000 unit capsule     Sig: Take 1 Cap by mouth every seven (7) days. Dispense:  14 Cap     Refill:  0    metFORMIN ER (GLUCOPHAGE XR) 500 mg tablet     SiT w dinner x 1 wk, 1T BIDAC x 1 wk, 1T w breakfast and 2T w dinner x 1 wk, 2T BIDAC thereafter E 11.9     Dispense:  120 Tab     Refill:  2       Patient Instructions   Extensive discussion today on the pathophysiology of diabetes, treatment options, goals of treatment and long term implications after this diagnosis.   Patient encouraged to lose weight through exercise and healthy diet  Referral to diabetic education made  Diabetic foot exam done today and normal  Vaccinated with PCV 23 and influenza today as per guidelines  We will start the patient on a metformin regimen, she is previously been on metformin immediate release and prefers to be on extended release and so I have sent this to her pharmacy  She will start with once daily with dinner, week 2 she will take once daily with breakfast and with dinner, week 3 she will take 1 with breakfast and 2 with dinner and week 4 she will take 2 with breakfast and 2 with dinner and this will be her goal dose thereafter  We discussed the potential future use of a GLP-1 inhibitor if metformin is not sufficient  Because she is exclusively on oral medications the guidelines do not dictate that she needs to start checking her blood sugar yet, and we will defer this at this time  She will need to have an A1c checked in about 3 months  She was found to be deficient in vitamin D and I have sent repletion to her pharmacy. 50,000 units weekly for the next 14 weeks and then an over-the-counter vitamin D supplement with 2000 4000 international units of vitamin D daily thereafter  Urged to continue with physical therapy and stay active    Encounter time today was >25 minutes and more than 50% of this encounter was spent in counseling face-to-face regarding Diagnosis, Patient Education, Medication Management, Compliance and Impressions as above        Latesha Henry  Chief Complaint   Patient presents with    Abnormal Lab Results     Follow up    Diabetes       SUBJECTIVE  Ivis Marcum is a 34 y.o. female presenting today for lab follow up. Accompanied by . Hx of PCOS, not consistent with use of metformin but labs indicated new Dx of DM. Pt returned to discuss. She is upset by this and somewhat tearful but wants to learn about her diagnosis and how to treat this.   Lab Results   Component Value Date/Time    Hemoglobin A1c 7.5 (H) 09/11/2019 11:51 AM    Glucose 162 (H) 09/11/2019 11:51 AM    LDL, calculated 83 09/11/2019 11:51 AM    Creatinine 0.67 09/11/2019 11:51 AM   weight down 10 lbs since here last visit  Vit d also found to be deficient, needs repletion  In PT right now for ongoing back pain, she is worried about signficantly increasing exercise and injuring her back worse  Agreeable for vaccines today  Review of Systems  A 12 point review of systems was negative except as noted here or in the HPI. OBJECTIVE  Visit Vitals  /74 (BP 1 Location: Left arm, BP Patient Position: Sitting)   Pulse 99   Temp 98.1 °F (36.7 °C) (Oral)   Resp 18   Ht 5' 5\" (1.651 m)   Wt 318 lb 12.8 oz (144.6 kg)   SpO2 100%   BMI 53.05 kg/m²       Physical Exam   Constitutional: She is oriented to person, place, and time. She appears well-developed and well-nourished. No distress. NAD, Nontoxic, Appears Stated Age, morbidly obese   HENT:   Head: Normocephalic and atraumatic. Mouth/Throat: Oropharynx is clear and moist.   Eyes: Conjunctivae and EOM are normal. Right eye exhibits no discharge. Left eye exhibits no discharge. No scleral icterus. Neck: Neck supple. No thyromegaly present. Cardiovascular: Normal rate, regular rhythm and normal heart sounds. Exam reveals no gallop and no friction rub. No murmur heard. Pulmonary/Chest: Effort normal and breath sounds normal. No stridor. No respiratory distress. She has no wheezes. She has no rales. Abdominal: Soft. Bowel sounds are normal. She exhibits no distension. There is no tenderness. There is no rebound and no guarding. Large pannus   Musculoskeletal: She exhibits edema (Trace bilateral to mid shin). She exhibits no tenderness. Right positive straight leg test   Lymphadenopathy:     She has no cervical adenopathy. Neurological: She is alert and oriented to person, place, and time.    Grossly intact CN  Diabetic foot exam:     Left Foot:   Visual Exam: normal    Pulse DP: 2+ (normal)   Filament test: normal sensation    Vibratory sensation: normal      Right Foot:   Visual Exam: normal    Pulse DP: 2+ (normal)   Filament test: normal sensation    Vibratory sensation: normal     Skin: Skin is warm and dry. No rash noted. She is not diaphoretic. Acanthosis nigricans neck   Psychiatric: She has a normal mood and affect. Her behavior is normal.   Nursing note and vitals reviewed. Results for orders placed or performed in visit on 09/25/19    DIABETES FOOT EXAM    Narrative    See chart, normal today       HISTORICAL  Reviewed and updated today, and as noted below:    Past Medical History:   Diagnosis Date    Abnormal pap 1/2014+ 2/2015    LGSIL pap    Anemia NEC     Chronic pain     Depression     Diabetes ([facility]     HPV vaccine counseling     gardasil series complete     HSV-2 infection     PCOS (polycystic ovarian syndrome)     Prediabetes     Sleep disorder     sleep apnea    Vitamin D deficiency      Past Surgical History:   Procedure Laterality Date    HX COLPOSCOPY  2/2014 + 4/2015    YAHAIRA I; 2015 neg path     Family History   Problem Relation Age of Onset    No Known Problems Mother     Heart Disease Father     Diabetes Father      Social History     Tobacco Use   Smoking Status Never Smoker   Smokeless Tobacco Never Used     Social History     Socioeconomic History    Marital status: SINGLE     Spouse name: Not on file    Number of children: Not on file    Years of education: Not on file    Highest education level: Not on file   Tobacco Use    Smoking status: Never Smoker    Smokeless tobacco: Never Used   Substance and Sexual Activity    Alcohol use:  Yes     Alcohol/week: 0.0 standard drinks     Comment: socially    Sexual activity: Yes     Partners: Male     Birth control/protection: Condom   Social History Narrative    ** Merged History Encounter **          Allergies   Allergen Reactions    Aspirin Unknown (comments)       Office Visit on 09/11/2019   Component Date Value Ref Range Status    WBC 09/11/2019 10.7  3.4 - 10.8 x10E3/uL Final    RBC 09/11/2019 4.21  3.77 - 5.28 x10E6/uL Final    HGB 09/11/2019 12.0  11.1 - 15.9 g/dL Final    HCT 09/11/2019 36.2  34.0 - 46.6 % Final    MCV 09/11/2019 86  79 - 97 fL Final    MCH 09/11/2019 28.5  26.6 - 33.0 pg Final    MCHC 09/11/2019 33.1  31.5 - 35.7 g/dL Final    RDW 09/11/2019 13.4  12.3 - 15.4 % Final    PLATELET 40/72/2099 050  150 - 450 x10E3/uL Final    Hemoglobin A1c 09/11/2019 7.5* 4.8 - 5.6 % Final    Comment:          Prediabetes: 5.7 - 6.4           Diabetes: >6.4           Glycemic control for adults with diabetes: <7.0      Estimated average glucose 09/11/2019 169  mg/dL Final    Cholesterol, total 09/11/2019 145  100 - 199 mg/dL Final    Triglyceride 09/11/2019 69  0 - 149 mg/dL Final    HDL Cholesterol 09/11/2019 48  >39 mg/dL Final    VLDL, calculated 09/11/2019 14  5 - 40 mg/dL Final    LDL, calculated 09/11/2019 83  0 - 99 mg/dL Final    Glucose 09/11/2019 162* 65 - 99 mg/dL Final    BUN 09/11/2019 9  6 - 20 mg/dL Final    Creatinine 09/11/2019 0.67  0.57 - 1.00 mg/dL Final    GFR est non-AA 09/11/2019 121  >59 mL/min/1.73 Final    GFR est AA 09/11/2019 139  >59 mL/min/1.73 Final    BUN/Creatinine ratio 09/11/2019 13  9 - 23 Final    Sodium 09/11/2019 140  134 - 144 mmol/L Final    Potassium 09/11/2019 4.6  3.5 - 5.2 mmol/L Final    Chloride 09/11/2019 98  96 - 106 mmol/L Final    CO2 09/11/2019 26  20 - 29 mmol/L Final    Calcium 09/11/2019 9.4  8.7 - 10.2 mg/dL Final    Protein, total 09/11/2019 7.1  6.0 - 8.5 g/dL Final    Albumin 09/11/2019 3.9  3.5 - 5.5 g/dL Final    GLOBULIN, TOTAL 09/11/2019 3.2  1.5 - 4.5 g/dL Final    A-G Ratio 09/11/2019 1.2  1.2 - 2.2 Final    Bilirubin, total 09/11/2019 0.3  0.0 - 1.2 mg/dL Final    Alk.  phosphatase 09/11/2019 100  39 - 117 IU/L Final    AST (SGOT) 09/11/2019 9  0 - 40 IU/L Final    ALT (SGPT) 09/11/2019 12  0 - 32 IU/L Final    TSH 09/11/2019 1.560  0.450 - 4.500 uIU/mL Final    VITAMIN D, 25-HYDROXY 09/11/2019 12.6* 30.0 - 100.0 ng/mL Final    Comment: Vitamin D deficiency has been defined by the [facility] practice guideline as a  level of serum 25-OH vitamin D less than 20 ng/mL (1,2). The Endocrine Society went on to further define vitamin D  insufficiency as a level between 21 and 29 ng/mL (2). 1. IOM ([place] of Medicine). 2010. Dietary reference     intakes for calcium and D. [facility]: The     [employer]. 2. Juanito MF, Niesha NC, Yisel BARNEY, et al.     Evaluation, treatment, and prevention of vitamin D     deficiency: an Endocrine Society clinical practice     guideline. JCEM. 2011 Jul; 96(7):1911-30.  TIBC 09/11/2019 337  250 - 450 ug/dL Final    UIBC 09/11/2019 284  131 - 425 ug/dL Final    Iron 09/11/2019 53  27 - 159 ug/dL Final    Iron % saturation 09/11/2019 16  15 - 55 % Final    Ferritin 09/11/2019 111  15 - 150 ng/mL Final    INTERPRETATION 09/11/2019 Note   Final    Supplemental report is available.  PDF Image [id number] Not applicable   Final         Bre Lam MD  [facility]  09/25/19 10:04 AM    Portions of this note may have been populated using smart dictation software and may have \"sounds-like\" errors present. Pt was counseled on risks, benefits and alternatives of treatment options. All questions were asked and answered and the patient was agreeable with the treatment plan as outlined.

## 2019-09-25 NOTE — PROGRESS NOTES
Chief Complaint   Patient presents with    Hypertension     Follow up     1. Have you been to the ER, urgent care clinic since your last visit? Hospitalized since your last visit? No    2. Have you seen or consulted any other health care providers outside of the [address] [place] [address] since your last visit? Include any pap smears or colon screening.  No

## 2019-10-01 ENCOUNTER — OFFICE VISIT (OUTPATIENT)
Dept: FAMILY MEDICINE CLINIC | Age: 29
End: 2019-10-01

## 2019-10-01 VITALS
DIASTOLIC BLOOD PRESSURE: 51 MMHG | SYSTOLIC BLOOD PRESSURE: 130 MMHG | HEART RATE: 86 BPM | WEIGHT: 293 LBS | BODY MASS INDEX: 48.82 KG/M2 | RESPIRATION RATE: 18 BRPM | TEMPERATURE: 97.7 F | HEIGHT: 65 IN

## 2019-10-01 DIAGNOSIS — M54.41 CHRONIC BILATERAL LOW BACK PAIN WITH RIGHT-SIDED SCIATICA: Primary | ICD-10-CM

## 2019-10-01 DIAGNOSIS — M43.16 SPONDYLOLISTHESIS OF LUMBAR REGION: ICD-10-CM

## 2019-10-01 DIAGNOSIS — G89.29 CHRONIC BILATERAL LOW BACK PAIN WITH RIGHT-SIDED SCIATICA: Primary | ICD-10-CM

## 2019-10-01 PROBLEM — E28.2 PCOS (POLYCYSTIC OVARIAN SYNDROME): Status: ACTIVE | Noted: 2017-01-13

## 2019-10-01 NOTE — PROGRESS NOTES
Family Medicine Follow-Up Progress Note  Patient: Jeremías Diaz  1990, 34 y.o., female  Encounter Date: 10/1/2019    ASSESSMENT & PLAN    ICD-10-CM ICD-9-CM    1. Chronic bilateral low back pain with right-sided sciatica M54.41 724.2     G89.29 724.3      338.29    2. Spondylolisthesis of lumbar region M43.16 738.4        No orders of the defined types were placed in this encounter. Patient Instructions   The patient is here today to discuss having paperwork filled out for her chronic low back pain, this is being predominantly managed by her orthopedist.  She is not having surgery at this time and instead is participating in physical therapy however he is the doctor who is prescribed her medication and he was ordered for therapy. Since she reports to me that he refused to fill her paperwork out because she has not surgical right now I have filled the paperwork out to the best of my ability after having reviewed his documentation and also independently examining the patient discussing her condition and diagnosis with her. I did make a copy of the paperwork today and I provided her with the original  She will follow-up with her orthopedist and continue with physical therapy for now      CHIEF COMPLAINT  Chief Complaint   Patient presents with    Documentation     FMLA for work     Back Pain       SUBJECTIVE  Jeremías Diaz is a 34 y.o. female presenting today for diability paperwork. She is seeking this for back pain. She reports to me that her Orthopedist refuses to fill out paperwork on non-surgical patients. Patient reports she has missed some days because of her back pain. She reports some days she wakes up and instead of being a 6/10 then she reports her pain is a 10/10 and she can't work because she has to take a muscle relaxer which makes her drowsy and unable to safely drive. She is requesting intermittent leave.  She reports additional breaks might help her symptoms because she would have the opportunitiy to get up and walk around. She reports to me she is unable to even walk around the grocery store because after more than 10 minutes her back pain is severe and debilitating  No bowel or bladder incontinence. No change in sensation in lower extremities. Review of Systems  A 12 point review of systems was negative except as noted here or in the HPI. OBJECTIVE  Visit Vitals  /51   Pulse 86   Temp 97.7 °F (36.5 °C) (Oral)   Resp 18   Ht 5' 5\" (1.651 m)   Wt 324 lb (147 kg)   BMI 53.92 kg/m²       Physical Exam   Constitutional: She is oriented to person, place, and time. She appears well-developed and well-nourished. No distress. NAD, Nontoxic, Appears Stated Age, morbidly obese   HENT:   Head: Normocephalic and atraumatic. Mouth/Throat: Oropharynx is clear and moist.   Eyes: Conjunctivae and EOM are normal. Right eye exhibits no discharge. Left eye exhibits no discharge. No scleral icterus. Neck: Neck supple. No thyromegaly present. Cardiovascular: Normal rate, regular rhythm and normal heart sounds. Exam reveals no gallop and no friction rub. No murmur heard. Pulmonary/Chest: Effort normal and breath sounds normal. No stridor. No respiratory distress. She has no wheezes. She has no rales. Abdominal: Soft. Bowel sounds are normal. She exhibits no distension. There is no tenderness. There is no rebound and no guarding. Large pannus   Musculoskeletal: She exhibits edema (Trace bilateral to mid shin) and tenderness (Significant right low back paraspinal tenderness to palpation, out of proportion to my expectation). Right positive straight leg test   Lymphadenopathy:     She has no cervical adenopathy. Neurological: She is alert and oriented to person, place, and time. She exhibits normal muscle tone. Grossly intact CN   Skin: Skin is warm and dry. No rash noted. She is not diaphoretic. Acanthosis nigricans neck   Psychiatric: She has a normal mood and affect.  Her behavior is normal.   Nursing note and vitals reviewed. No results found for any visits on 10/01/19. HISTORICAL  Reviewed and updated today, and as noted below:    Past Medical History:   Diagnosis Date    Abnormal pap 1/2014+ 2/2015    LGSIL pap    Anemia NEC     Chronic pain     Depression     Diabetes ([facility]     HPV vaccine counseling     gardasil series complete     HSV-2 infection     PCOS (polycystic ovarian syndrome)     Prediabetes     Sleep disorder     sleep apnea    Vitamin D deficiency      Past Surgical History:   Procedure Laterality Date    HX COLPOSCOPY  2/2014 + 4/2015    YAHAIRA I; 2015 neg path     Family History   Problem Relation Age of Onset    No Known Problems Mother     Heart Disease Father     Diabetes Father      Social History     Tobacco Use   Smoking Status Never Smoker   Smokeless Tobacco Never Used     Social History     Socioeconomic History    Marital status: SINGLE     Spouse name: Not on file    Number of children: Not on file    Years of education: Not on file    Highest education level: Not on file   Tobacco Use    Smoking status: Never Smoker    Smokeless tobacco: Never Used   Substance and Sexual Activity    Alcohol use:  Yes     Alcohol/week: 0.0 standard drinks     Comment: socially    Sexual activity: Yes     Partners: Male     Birth control/protection: Condom   Social History Narrative    ** Merged History Encounter **          Allergies   Allergen Reactions    Aspirin Unknown (comments)       Office Visit on 09/11/2019   Component Date Value Ref Range Status    WBC 09/11/2019 10.7  3.4 - 10.8 x10E3/uL Final    RBC 09/11/2019 4.21  3.77 - 5.28 x10E6/uL Final    HGB 09/11/2019 12.0  11.1 - 15.9 g/dL Final    HCT 09/11/2019 36.2  34.0 - 46.6 % Final    MCV 09/11/2019 86  79 - 97 fL Final    MCH 09/11/2019 28.5  26.6 - 33.0 pg Final    MCHC 09/11/2019 33.1  31.5 - 35.7 g/dL Final    RDW 09/11/2019 13.4  12.3 - 15.4 % Final    PLATELET 70/35/5398 154 150 - 450 x10E3/uL Final    Hemoglobin A1c 09/11/2019 7.5* 4.8 - 5.6 % Final    Comment:          Prediabetes: 5.7 - 6.4           Diabetes: >6.4           Glycemic control for adults with diabetes: <7.0      Estimated average glucose 09/11/2019 169  mg/dL Final    Cholesterol, total 09/11/2019 145  100 - 199 mg/dL Final    Triglyceride 09/11/2019 69  0 - 149 mg/dL Final    HDL Cholesterol 09/11/2019 48  >39 mg/dL Final    VLDL, calculated 09/11/2019 14  5 - 40 mg/dL Final    LDL, calculated 09/11/2019 83  0 - 99 mg/dL Final    Glucose 09/11/2019 162* 65 - 99 mg/dL Final    BUN 09/11/2019 9  6 - 20 mg/dL Final    Creatinine 09/11/2019 0.67  0.57 - 1.00 mg/dL Final    GFR est non-AA 09/11/2019 121  >59 mL/min/1.73 Final    GFR est AA 09/11/2019 139  >59 mL/min/1.73 Final    BUN/Creatinine ratio 09/11/2019 13  9 - 23 Final    Sodium 09/11/2019 140  134 - 144 mmol/L Final    Potassium 09/11/2019 4.6  3.5 - 5.2 mmol/L Final    Chloride 09/11/2019 98  96 - 106 mmol/L Final    CO2 09/11/2019 26  20 - 29 mmol/L Final    Calcium 09/11/2019 9.4  8.7 - 10.2 mg/dL Final    Protein, total 09/11/2019 7.1  6.0 - 8.5 g/dL Final    Albumin 09/11/2019 3.9  3.5 - 5.5 g/dL Final    GLOBULIN, TOTAL 09/11/2019 3.2  1.5 - 4.5 g/dL Final    A-G Ratio 09/11/2019 1.2  1.2 - 2.2 Final    Bilirubin, total 09/11/2019 0.3  0.0 - 1.2 mg/dL Final    Alk. phosphatase 09/11/2019 100  39 - 117 IU/L Final    AST (SGOT) 09/11/2019 9  0 - 40 IU/L Final    ALT (SGPT) 09/11/2019 12  0 - 32 IU/L Final    TSH 09/11/2019 1.560  0.450 - 4.500 uIU/mL Final    VITAMIN D, 25-HYDROXY 09/11/2019 12.6* 30.0 - 100.0 ng/mL Final    Comment: Vitamin D deficiency has been defined by the [address] practice guideline as a  level of serum 25-OH vitamin D less than 20 ng/mL (1,2). The Endocrine Society went on to further define vitamin D  insufficiency as a level between 21 and 29 ng/mL (2).   1. IOM [facility] of Medicine). 2010. Dietary reference     intakes for calcium and D. [facility]: The     [employer]. 2. Juanito MF, Niesha NC, Yisel BARNEY, et al.     Evaluation, treatment, and prevention of vitamin D     deficiency: an Endocrine Society clinical practice     guideline. JCEM. 2011 Jul; 96(7):1911-30.  TIBC 09/11/2019 337  250 - 450 ug/dL Final    UIBC 09/11/2019 284  131 - 425 ug/dL Final    Iron 09/11/2019 53  27 - 159 ug/dL Final    Iron % saturation 09/11/2019 16  15 - 55 % Final    Ferritin 09/11/2019 111  15 - 150 ng/mL Final    INTERPRETATION 09/11/2019 Note   Final    Supplemental report is available.  PDF Image [id number] Not applicable   Final         Lizbet Taylor MD  [facility]  10/01/19 7:55 AM    Portions of this note may have been populated using smart dictation software and may have \"sounds-like\" errors present. Pt was counseled on risks, benefits and alternatives of treatment options. All questions were asked and answered and the patient was agreeable with the treatment plan as outlined.

## 2019-10-01 NOTE — PATIENT INSTRUCTIONS
The patient is here today to discuss having paperwork filled out for her chronic low back pain, this is being predominantly managed by her orthopedist.  She is not having surgery at this time and instead is participating in physical therapy however he is the doctor who is prescribed her medication and he was ordered for therapy. Since she reports to me that he refused to fill her paperwork out because she has not surgical right now I have filled the paperwork out to the best of my ability after having reviewed his documentation and also independently examining the patient discussing her condition and diagnosis with her.   I did make a copy of the paperwork today and I provided her with the original  She will follow-up with her orthopedist and continue with physical therapy for now

## 2019-10-04 ENCOUNTER — PATIENT MESSAGE (OUTPATIENT)
Dept: FAMILY MEDICINE CLINIC | Age: 29
End: 2019-10-04

## 2019-10-04 ENCOUNTER — HOSPITAL ENCOUNTER (OUTPATIENT)
Dept: DIABETES SERVICES | Age: 29
Discharge: HOME | End: 2019-10-04
Payer: COMMERCIAL

## 2019-10-04 PROCEDURE — G0109 DIAB MANAGE TRN IND/GROUP: HCPCS | Performed by: DIETITIAN, REGISTERED

## 2019-10-04 RX ORDER — LANCETS
EACH MISCELLANEOUS
Qty: 100 EACH | Refills: 11 | Status: SHIPPED | OUTPATIENT
Start: 2019-10-04 | End: 2022-08-11 | Stop reason: SDUPTHER

## 2019-10-04 RX ORDER — BLOOD-GLUCOSE METER
EACH MISCELLANEOUS
Qty: 1 EACH | Refills: 0 | Status: SHIPPED | OUTPATIENT
Start: 2019-10-04 | End: 2022-08-11 | Stop reason: SDUPTHER

## 2019-10-04 NOTE — DIABETES MGMT
10/04/19        Thank you for your kind referral. Your patient Olegraio Yuri, attended Session #1 at [address] [place] [address] where the following topics were covered today . * Describing diabetes disease process and treatment options  * Incorporating nutrition management into their lifestyle  * Monitoring blood glucose and other parameters and interpreting and using the results for self management decision making. * Preventing detecting and treating acute complications  * Incorporating physical activity into their lifestyle  * Using medications safely and for the maximum therapeutic effectiveness  * Developing personal strategies to promote health and behavior changes  * Developing personal strategies to address psychosocial issues and concerns    Data from first visit:  Weight: 10/4/2019 322.2#  HgbA1c: 9/11/2019 7.5%  Increased risk for diabetes: 5.7-6.4%, Diabetes >6.4%  Glycemic control for adults with diabetes: < 7% Elderly or multiple medical conditions <8%  Random blood glucose: 10/4/2019 Fasting 123 mg/dl  Meter given: Accu Chek Guide  Goal(s) set : Goal 1: exercise at the gym for 15 minutes 5 days a week    Your patient will have two (2) additional appointments to complete the ordered education. Their next visit is scheduled for 10-18-19. We look forward to assisting your patient in meeting their self- management goals.  If you have any questions please do not hesitate to call the [facility] at [phone]      Sincerely,     Leonel Arevalo, [address], [place] [address]  [phone]

## 2019-10-04 NOTE — TELEPHONE ENCOUNTER
Zuleima Nunes 10/4/2019 11:40 AM EDT        ----- Message -----  From: Jessica Corcoran  Sent: 10/4/2019 11:20 AM EDT  To: [facility]  Subject: Prescription Question     The nutritionist wanted me to get a prescription for accu check guide meter strips and fast clix lancing.

## 2019-11-18 RX ORDER — ESCITALOPRAM OXALATE 20 MG/1
40 TABLET ORAL DAILY
Qty: 60 TAB | Refills: 5 | Status: SHIPPED | OUTPATIENT
Start: 2019-11-18 | End: 2020-04-16

## 2019-11-18 NOTE — TELEPHONE ENCOUNTER
----- Message from Ruel Schwartz sent at 11/18/2019  9:56 AM EST -----  Regarding: RE: Prescription Question  Contact: [phone]  Hi,   Yes I do. I'm sorry i copied and paste the name and forgot to write a message. Carmencita Quintanilla    ----- Message -----  From: Fatou Nunes  Sent: 11/18/19, 7:58 AM  To: Ruel Schwartz  Subject: RE: Prescription Question    Good Morning,   Do you need a refill on your medication?   ThanksShanti     ----- Message -----     From: Ruel Schwartz     Sent: 11/15/2019  2:21 PM EST       To: Patient Medical Advice Request Mailing List  Subject: Prescription Question    ESCITALOPRAM 20 MG

## 2019-12-11 DIAGNOSIS — E11.9 NEW ONSET TYPE 2 DIABETES MELLITUS (HCC): ICD-10-CM

## 2020-01-07 LAB
EST. AVERAGE GLUCOSE BLD GHB EST-MCNC: 189 MG/DL
HBA1C MFR BLD: 8.2 % (ref 4.8–5.6)

## 2020-01-07 NOTE — PROGRESS NOTES
Please keep your upcoming appointment with me-your diabetic control is worse and we need to intensify your treatment.

## 2020-01-09 ENCOUNTER — OFFICE VISIT (OUTPATIENT)
Dept: FAMILY MEDICINE CLINIC | Age: 30
End: 2020-01-09

## 2020-01-09 VITALS
HEART RATE: 91 BPM | TEMPERATURE: 99.3 F | RESPIRATION RATE: 18 BRPM | SYSTOLIC BLOOD PRESSURE: 121 MMHG | WEIGHT: 293 LBS | DIASTOLIC BLOOD PRESSURE: 70 MMHG | BODY MASS INDEX: 48.82 KG/M2 | HEIGHT: 65 IN

## 2020-01-09 DIAGNOSIS — G89.29 CHRONIC BILATERAL LOW BACK PAIN WITH RIGHT-SIDED SCIATICA: ICD-10-CM

## 2020-01-09 DIAGNOSIS — E11.9 NEW ONSET TYPE 2 DIABETES MELLITUS (HCC): Primary | ICD-10-CM

## 2020-01-09 DIAGNOSIS — E66.01 OBESITY, MORBID (HCC): ICD-10-CM

## 2020-01-09 DIAGNOSIS — M54.41 CHRONIC BILATERAL LOW BACK PAIN WITH RIGHT-SIDED SCIATICA: ICD-10-CM

## 2020-01-09 RX ORDER — METFORMIN HYDROCHLORIDE 500 MG/1
TABLET, EXTENDED RELEASE ORAL
Qty: 120 TAB | Refills: 2 | Status: SHIPPED | OUTPATIENT
Start: 2020-01-09 | End: 2020-07-30 | Stop reason: SDUPTHER

## 2020-01-09 RX ORDER — MEGESTROL ACETATE 40 MG/1
TABLET ORAL
COMMUNITY
Start: 2019-12-30 | End: 2020-12-07

## 2020-01-09 NOTE — PATIENT INSTRUCTIONS
1. New onset type 2 diabetes mellitus ([facility]  Great job getting back on the right path, continue with exercising, continue with medication, titrate up if tolerated. Check blood sugar a couple times a week first thing in the morning and keep track of these numbers, send them to me through my chart or keep them in your phone, we want at least 2 weeks of data before the next visit  Please try to cut out drinking soda, consider tracking your dietary intake so that we can get an idea of if there are any areas where we can make considerable improvements  I recommended considering using a smaller plate so that we can moderate portion control and eating at least half of our dinner portion as vegetables  Patient reports she wants to cut down on her carbs. I think this is a great idea if done in a safe way. 2. Obesity, morbid ([facility]  4 pounds weight loss since last visit here. Also as above    3. Chronic bilateral low back pain with right-sided sciatica  Encourage the patient that exercise may actually improve some of this chronic ongoing back pain.   Encouraged her to continue to follow with specialist on an as-needed basis

## 2020-01-09 NOTE — PROGRESS NOTES
Chief Complaint   Patient presents with    Results     lab f/u     1. Have you been to the ER, urgent care clinic since your last visit? Hospitalized since your last visit? No     2. Have you seen or consulted any other health care providers outside of the [address] [place] [address] since your last visit? Include any pap smears or colon screening.  No

## 2020-01-09 NOTE — PROGRESS NOTES
Family Medicine Follow-Up Progress Note  Patient: Awais Cardenas  1990, 34 y.o., female  Encounter Date: 2020    ASSESSMENT & PLAN    ICD-10-CM ICD-9-CM    1. New onset type 2 diabetes mellitus ([facility] E11.9 250.00    2. Obesity, morbid ([facility] E66.01 278.01    3. Chronic bilateral low back pain with right-sided sciatica M54.41 724.2     G89.29 724.3      338.29        Orders Placed This Encounter    megestrol (MEGACE) 40 mg tablet     Sig: TAKE 1 TABLET BY MOUTH EVERY DAY FOR 2 DAYS THEN 1 TABLET TWICE A DAY    metFORMIN ER (GLUCOPHAGE XR) 500 mg tablet     SiT w dinner x 1 wk, 1T BIDAC x 1 wk, 1T w breakfast and 2T w dinner x 1 wk, 2T BIDAC thereafter E 11.9     Dispense:  120 Tab     Refill:  2       Patient Instructions   1. New onset type 2 diabetes mellitus ([facility]  Great job getting back on the right path, continue with exercising, continue with medication, titrate up if tolerated. Check blood sugar a couple times a week first thing in the morning and keep track of these numbers, send them to me through my chart or keep them in your phone, we want at least 2 weeks of data before the next visit  Please try to cut out drinking soda, consider tracking your dietary intake so that we can get an idea of if there are any areas where we can make considerable improvements  I recommended considering using a smaller plate so that we can moderate portion control and eating at least half of our dinner portion as vegetables  Patient reports she wants to cut down on her carbs. I think this is a great idea if done in a safe way. 2. Obesity, morbid ([facility]  4 pounds weight loss since last visit here. Also as above    3. Chronic bilateral low back pain with right-sided sciatica  Encourage the patient that exercise may actually improve some of this chronic ongoing back pain.   Encouraged her to continue to follow with specialist on an as-needed basis        [address] [place] [address]  Chief Complaint   Patient presents with   Jordan Calabrese Results     lab f/u       Edmar Pascal is a 34 y.o. female presenting today for follow up  DM control is worse  She is accompanied by her   She reports she went to the diabetes treatment class and she was crying. She feels very disheartened  She has not been compliant with her medication and isn't taking it every day  She has seen her a1c increase  She has signed up for the gym, she is going to go to the gym every week now as a gual    Started back taking the metformin. She is taking one twice a day right now. She wants to reduce carbs to help her diet, she does LOVE veggies and eats healthy protein  She is starting to try to focus more on what she is eating. She is drinking soda again but feels committed to cutting out soda. 4 lbs weight loss since her last visit    In the past back pain has been scary for her because she is afraid of becoming addicted to pills. She thinks that is why it is hard for her to take metformin because she has an aversion to pills. Review of Systems  A 12 point review of systems was negative except as noted here or in the HPI. OBJECTIVE  Visit Vitals  /70   Pulse 91   Temp 99.3 °F (37.4 °C) (Oral)   Resp 18   Ht 5' 5\" (1.651 m)   Wt 320 lb (145.2 kg)   BMI 53.25 kg/m²       Physical Exam  Vitals signs and nursing note reviewed. Constitutional:       General: She is not in acute distress. Appearance: She is well-developed. She is not diaphoretic. Comments: NAD, Nontoxic, Appears Stated Age, morbidly obese   HENT:      Head: Normocephalic and atraumatic. Eyes:      General: No scleral icterus. Right eye: No discharge. Left eye: No discharge. Conjunctiva/sclera: Conjunctivae normal.   Neck:      Musculoskeletal: Neck supple. Thyroid: No thyromegaly. Cardiovascular:      Rate and Rhythm: Normal rate and regular rhythm. Heart sounds: Normal heart sounds. No murmur. No friction rub. No gallop.     Pulmonary: Effort: Pulmonary effort is normal. No respiratory distress. Breath sounds: Normal breath sounds. No stridor. No wheezing or rales. Abdominal:      General: Bowel sounds are normal. There is no distension. Palpations: Abdomen is soft. Tenderness: There is no tenderness. There is no guarding or rebound. Comments: Large pannus   Musculoskeletal:         General: No tenderness. Comments: Right positive straight leg test, difficulty climbing down from table d/t back pain   Lymphadenopathy:      Cervical: No cervical adenopathy. Skin:     General: Skin is warm and dry. Findings: No rash. Comments: Acanthosis nigricans neck   Neurological:      Mental Status: She is alert and oriented to person, place, and time. Comments: Grossly intact CN   Psychiatric:         Behavior: Behavior normal.         No results found for any visits on 01/09/20.     HISTORICAL  Reviewed and updated today, and as noted below:    Past Medical History:   Diagnosis Date    Abnormal pap 1/2014+ 2/2015    LGSIL pap    Anemia NEC     Chronic pain     Depression     Diabetes ([facility]     HPV vaccine counseling     gardasil series complete     HSV-2 infection     PCOS (polycystic ovarian syndrome)     Prediabetes     Sleep disorder     sleep apnea    Vitamin D deficiency      Past Surgical History:   Procedure Laterality Date    HX COLPOSCOPY  2/2014 + 4/2015    YAHAIRA I; 2015 neg path     Family History   Problem Relation Age of Onset    No Known Problems Mother     Heart Disease Father     Diabetes Father      Social History     Tobacco Use   Smoking Status Never Smoker   Smokeless Tobacco Never Used     Social History     Socioeconomic History    Marital status:      Spouse name: Not on file    Number of children: Not on file    Years of education: Not on file    Highest education level: Not on file   Tobacco Use    Smoking status: Never Smoker    Smokeless tobacco: Never Used Substance and Sexual Activity    Alcohol use: Yes     Alcohol/week: 0.0 standard drinks     Comment: socially    Sexual activity: Yes     Partners: Male     Birth control/protection: Condom   Social History Narrative    ** Merged History Encounter **          Allergies   Allergen Reactions    Aspirin Unknown (comments)       Orders Only on 12/11/2019   Component Date Value Ref Range Status    Hemoglobin A1c 01/06/2020 8.2* 4.8 - 5.6 % Final    Comment:          Prediabetes: 5.7 - 6.4           Diabetes: >6.4           Glycemic control for adults with diabetes: <7.0      Estimated average glucose 01/06/2020 189  mg/dL Final         MD Abdi Monreal  Family Practice  01/09/20 2:07 PM    Portions of this note may have been populated using smart dictation software and may have \"sounds-like\" errors present. Pt was counseled on risks, benefits and alternatives of treatment options. All questions were asked and answered and the patient was agreeable with the treatment plan as outlined. Encounter time today was >25 minutes and more than 50% of this encounter was spent in counseling face-to-face regarding Diagnosis, Patient Education, Medication Management, Compliance and Impressions.

## 2020-02-20 ENCOUNTER — OFFICE VISIT (OUTPATIENT)
Dept: FAMILY MEDICINE CLINIC | Age: 30
End: 2020-02-20

## 2020-02-20 ENCOUNTER — HOSPITAL ENCOUNTER (OUTPATIENT)
Dept: LAB | Age: 30
Discharge: HOME | End: 2020-02-20

## 2020-02-20 VITALS
WEIGHT: 293 LBS | SYSTOLIC BLOOD PRESSURE: 127 MMHG | DIASTOLIC BLOOD PRESSURE: 77 MMHG | RESPIRATION RATE: 18 BRPM | HEART RATE: 94 BPM | BODY MASS INDEX: 48.82 KG/M2 | HEIGHT: 65 IN | TEMPERATURE: 98.2 F

## 2020-02-20 DIAGNOSIS — E11.9 NEW ONSET TYPE 2 DIABETES MELLITUS (HCC): Primary | ICD-10-CM

## 2020-02-20 DIAGNOSIS — L85.3 XEROSIS CUTIS: ICD-10-CM

## 2020-02-20 DIAGNOSIS — E11.9 NEW ONSET TYPE 2 DIABETES MELLITUS (HCC): ICD-10-CM

## 2020-02-20 DIAGNOSIS — L82.1 SEBORRHEIC KERATOSIS: ICD-10-CM

## 2020-02-20 DIAGNOSIS — E66.01 OBESITY, MORBID (HCC): ICD-10-CM

## 2020-02-20 LAB
COMMENT, HOLDF: NORMAL
CREAT UR-MCNC: 58.5 MG/DL
MICROALBUMIN UR-MCNC: 0.8 MG/DL
MICROALBUMIN/CREAT UR-RTO: 14 MG/G (ref 0–30)
SAMPLES BEING HELD,HOLD: NORMAL

## 2020-02-20 NOTE — PROGRESS NOTES
Family Medicine Follow-Up Progress Note  Patient: David Plascencia  1990, 34 y.o., female  Encounter Date: 2020    ASSESSMENT & PLAN    ICD-10-CM ICD-9-CM    1. New onset type 2 diabetes mellitus (HCC) E11.9 250.00 MICROALBUMIN, UR, RAND W/ MICROALB/CREAT RATIO      VITAMIN D, 25 HYDROXY      HEMOGLOBIN A1C WITH EAG   2. Obesity, morbid ([address] E66.01 278.01    3. Xerosis cutis L85.3 706.8    4. Seborrheic keratosis L82.1 702.19        Orders Placed This Encounter    MICROALBUMIN, UR, RAND W/ MICROALB/CREAT RATIO     Standing Status:   Future     Standing Expiration Date:   2020    VITAMIN D, 25 HYDROXY     Standing Status:   Future     Standing Expiration Date:   2021    HEMOGLOBIN A1C WITH EAG     Standing Status:   Future     Standing Expiration Date:   2021    diphtheria-pertussis, acellular,-tetanus (ACELL) 2.5-8-5 Lf-mcg-Lf/0.5mL susp susp     Si.5 mL by IntraMUSCular route once for 1 dose. Dispense:  0.5 mL     Refill:  0       Patient Instructions   For for xerosis cutis on feet I recommend the use of lactic acid-containing products like AmLactin or Lac-Hydrin. These are available over-the-counter, do not use them on skin that is damaged and integrity for example cut or recently shaved because it will sting  Can look into lotions that have ceramides in them  Diabetic control seems to be improving considerably, just 5 months ago her fasting sugars were in the 160s and now they are in the 100s. I have encouraged her that this is excellent control  Obesity: She is lost 7 pounds since her last visit, she is doing an excellent job, she is starting to work with [employer] and this seems to be helping a ton  Discussion of what appears to be a benign seborrheic keratosis on her back, we will continue to reevaluate this at intervals.       CHIEF COMPLAINT  Chief Complaint   Patient presents with    Diabetes     follow up     Obesity     follow up     Skin Problem     dry feet and skin - at times flaky after 2 hours of applying lotion        SUBJECTIVE  Gabe Adame is a 34 y.o. female presenting today for diabetic follow up  Reports taking pills more regularly. She is choosing what she eats better  She is checking her sugars at times at home, she works from 3p-midnight so she isn't always consistent with her routine. She reports the most recent FBG she had was 107 to 114  She recently signed up for weight watchers for diabetics. She hasn't gone to meetings but is starting to use points systems  Lost 7 lbs since last visit, she is being diligent about this  She reports she's been having a lot of dry skin, sometimes gets patches, especially on hands and feet. She has tried aquafor and eucerin, also using cocoa butter. Back pain: started back with PT and going to have an injection tomorrow with Pain and spine doctor and using flexeril PRN    Skin lesion on back, sometimes itch    Mentions dry skin on feet that she wants a cream for above  Review of Systems  A 12 point review of systems was negative except as noted here or in the HPI. OBJECTIVE  Visit Vitals  /77   Pulse 94   Temp 98.2 °F (36.8 °C) (Oral)   Resp 18   Ht 5' 5\" (1.651 m)   Wt 313 lb (142 kg)   BMI 52.09 kg/m²       Physical Exam  Vitals signs and nursing note reviewed. Constitutional:       General: She is not in acute distress. Appearance: Normal appearance. She is well-developed. She is not diaphoretic. Comments: NAD, Nontoxic, Appears Stated Age, morbidly obese   HENT:      Head: Normocephalic and atraumatic. Right Ear: External ear normal.      Left Ear: External ear normal.      Nose: Nose normal. No congestion. Mouth/Throat:      Mouth: Mucous membranes are moist.      Pharynx: Oropharynx is clear. No oropharyngeal exudate or posterior oropharyngeal erythema. Eyes:      General: No scleral icterus. Right eye: No discharge. Left eye: No discharge.       Extraocular Movements: Extraocular movements intact. Conjunctiva/sclera: Conjunctivae normal.      Pupils: Pupils are equal, round, and reactive to light. Neck:      Musculoskeletal: Normal range of motion and neck supple. Thyroid: No thyromegaly. Vascular: No carotid bruit. Cardiovascular:      Rate and Rhythm: Normal rate and regular rhythm. Heart sounds: Normal heart sounds. No murmur. No friction rub. No gallop. Pulmonary:      Effort: Pulmonary effort is normal. No respiratory distress. Breath sounds: Normal breath sounds. No stridor. No wheezing, rhonchi or rales. Abdominal:      General: Bowel sounds are normal. There is no distension. Palpations: Abdomen is soft. Tenderness: There is no abdominal tenderness. There is no guarding or rebound. Comments: Large pannus   Musculoskeletal: Normal range of motion. General: No tenderness. Right lower leg: No edema. Left lower leg: No edema. Comments: Right positive straight leg test, difficulty climbing down from table d/t back pain   Lymphadenopathy:      Cervical: No cervical adenopathy. Skin:     General: Skin is warm and dry. Capillary Refill: Capillary refill takes less than 2 seconds. Findings: No rash. Comments: Acanthosis nigricans neck  In mid back under bra strap there is a sub-centimeters slightly linear appearing seborrheic keratosis that does not appear to be infected or inflamed at this time   Neurological:      General: No focal deficit present. Mental Status: She is alert and oriented to person, place, and time. Coordination: Coordination normal.      Gait: Gait normal.      Comments: Grossly intact CN   Psychiatric:         Mood and Affect: Mood normal.         Behavior: Behavior normal.         Thought Content: Thought content normal.         Judgment: Judgment normal.         No results found for any visits on 02/20/20.     HISTORICAL  Reviewed and updated today, and as noted below:    Past Medical History:   Diagnosis Date    Abnormal pap 1/2014+ 2/2015    LGSIL pap    Anemia NEC     Chronic pain     Depression     Depression     Diabetes (HCC)     HPV vaccine counseling     gardasil series complete     HSV-2 infection     PCOS (polycystic ovarian syndrome)     Prediabetes     Sleep disorder     sleep apnea    Vitamin D deficiency      Past Surgical History:   Procedure Laterality Date    HX COLPOSCOPY  2/2014 + 4/2015    YAHAIRA I; 2015 neg path     Family History   Problem Relation Age of Onset    No Known Problems Mother     Heart Disease Father     Diabetes Father      Social History     Tobacco Use   Smoking Status Never Smoker   Smokeless Tobacco Never Used     Social History     Socioeconomic History    Marital status:      Spouse name: Not on file    Number of children: Not on file    Years of education: Not on file    Highest education level: Not on file   Tobacco Use    Smoking status: Never Smoker    Smokeless tobacco: Never Used   Substance and Sexual Activity    Alcohol use: Yes     Alcohol/week: 0.0 standard drinks     Comment: socially    Sexual activity: Yes     Partners: Male     Birth control/protection: Condom   Social History Narrative    ** Merged History Encounter **          Allergies   Allergen Reactions    Aspirin Unknown (comments)       Orders Only on 12/11/2019   Component Date Value Ref Range Status    Hemoglobin A1c 01/06/2020 8.2* 4.8 - 5.6 % Final    Comment:          Prediabetes: 5.7 - 6.4           Diabetes: >6.4           Glycemic control for adults with diabetes: <7.0      Estimated average glucose 01/06/2020 189  mg/dL Final         Justus Martinez MD  [facility]  02/20/20 10:26 AM    Portions of this note may have been populated using smart dictation software and may have \"sounds-like\" errors present. Pt was counseled on risks, benefits and alternatives of treatment options. All questions were asked and answered and the patient was agreeable with the treatment plan as outlined.

## 2020-02-20 NOTE — PROGRESS NOTES
Chief Complaint   Patient presents with    Diabetes     follow up     Obesity     follow up     Skin Problem     dry feet and skin - at times flaky after 2 hours of applying lotion      Eye Doctor- [facility]

## 2020-02-20 NOTE — PATIENT INSTRUCTIONS
For for xerosis cutis on feet I recommend the use of lactic acid-containing products like AmLactin or Lac-Hydrin. These are available over-the-counter, do not use them on skin that is damaged and integrity for example cut or recently shaved because it will sting  Can look into lotions that have ceramides in them  Diabetic control seems to be improving considerably, just 5 months ago her fasting sugars were in the 160s and now they are in the 100s. I have encouraged her that this is excellent control  Obesity: She is lost 7 pounds since her last visit, she is doing an excellent job, she is starting to work with [employer] and this seems to be helping a ton  Discussion of what appears to be a benign seborrheic keratosis on her back, we will continue to reevaluate this at intervals.

## 2020-04-16 ENCOUNTER — VIRTUAL VISIT (OUTPATIENT)
Dept: FAMILY MEDICINE CLINIC | Age: 30
End: 2020-04-16

## 2020-04-16 DIAGNOSIS — E66.01 OBESITY, MORBID (HCC): Primary | ICD-10-CM

## 2020-04-16 DIAGNOSIS — G89.29 CHRONIC BILATERAL LOW BACK PAIN WITH RIGHT-SIDED SCIATICA: ICD-10-CM

## 2020-04-16 DIAGNOSIS — F32.A DEPRESSION, UNSPECIFIED DEPRESSION TYPE: ICD-10-CM

## 2020-04-16 DIAGNOSIS — M54.41 CHRONIC BILATERAL LOW BACK PAIN WITH RIGHT-SIDED SCIATICA: ICD-10-CM

## 2020-04-16 DIAGNOSIS — E11.9 NEW ONSET TYPE 2 DIABETES MELLITUS (HCC): ICD-10-CM

## 2020-04-16 PROBLEM — R63.5 ABNORMAL WEIGHT GAIN: Status: ACTIVE | Noted: 2017-10-19

## 2020-04-16 PROBLEM — F41.9 ANXIETY: Status: ACTIVE | Noted: 2017-10-25

## 2020-04-16 PROBLEM — E55.9 VITAMIN D DEFICIENCY: Status: ACTIVE | Noted: 2017-10-25

## 2020-04-16 PROBLEM — N92.6 IRREGULAR PERIODS: Status: ACTIVE | Noted: 2017-10-19

## 2020-04-16 PROBLEM — N92.0 MENORRHAGIA: Status: ACTIVE | Noted: 2017-11-14

## 2020-04-16 RX ORDER — TIZANIDINE 4 MG/1
TABLET ORAL
COMMUNITY
Start: 2020-02-21 | End: 2021-07-22

## 2020-04-16 RX ORDER — FLUOCINONIDE 0.5 MG/G
CREAM TOPICAL
COMMUNITY
Start: 2020-01-16 | End: 2020-04-16

## 2020-04-16 RX ORDER — DULOXETIN HYDROCHLORIDE 30 MG/1
30 CAPSULE, DELAYED RELEASE ORAL DAILY
Qty: 60 CAP | Refills: 1 | Status: SHIPPED | OUTPATIENT
Start: 2020-04-16 | End: 2020-05-28 | Stop reason: SDUPTHER

## 2020-04-16 NOTE — PROGRESS NOTES
Hannah Torres is a 31 y.o. female who was seen by synchronous (real-time) audio-video technology on 4/16/2020. Assessment & Plan:   Diagnoses and all orders for this visit:    1. Obesity, morbid ([address]    2. Depression, unspecified depression type  -     DULoxetine (Cymbalta) 30 mg capsule; Take 1 Cap by mouth daily. FOR 7 DAYS, THEN TAKE TWO DAILY  Indications: chronic muscle or bone pain    3. New onset type 2 diabetes mellitus ([address]    4. Chronic bilateral low back pain with right-sided sciatica  -     DULoxetine (Cymbalta) 30 mg capsule; Take 1 Cap by mouth daily. FOR 7 DAYS, THEN TAKE TWO DAILY  Indications: chronic muscle or bone pain      Weight gain may be due in part to inactivity from back pain and also due to use of megace. Recommend that the patient use exercise therapeutically and keep track of her diet. Talked specifically about considering using a protein drink or snack (low in sugar, like premier protein) to help with curbing cravings and eating late at night  Mood and chronic pain: off lexapro, trail of cymbalta to see if improvement in chronic pain symptoms, also helpful with depression/anxiety. Pt to titrate from 30 to 60 and have a 6 wk follow up appt. DM2: recommend track sugars periodically, c/w medication, f/u with labs when due    Call with concerns other wise 6wk follow up      CPT Codes 72701-50674 for Established Patients may apply to this Telehealth Visit        Subjective:   Hannah Torres was seen for Obesity; Weight Management; and Diabetes     She reports on the whole she's doing ok    She reports she is having an issue with her ongoing and chronic back pain. She was meant to go have a hip Xray and go have another injection with her doctor, but she has put this on hold temporarily. Continues to have chronic hip and back pain. She reports that the medication she is on makes her drowsy and that's making it hard for her to work   The tizanidine makes her sleepier than flexeril. She reports the ibuprofen does not seem to help    Diabetes: hasn't been checking her blood sugars often. She wants to start. She has been watching what she eats and making conscientious choices. She thinks the megace is making her eat more. She has not weighed herself at home. She thinks she's gained weight just hwo her clothes are fitting. Checked her blood sugar during her visit with me and it was: (nonfasting--finished eating 10 minutes ago) 158    The last few times she has checked her bg she reports have been approx this range. Prior to Admission medications    Medication Sig Start Date End Date Taking? Authorizing Provider   tiZANidine (ZANAFLEX) 4 mg tablet TAKE 1 TABLET BY MOUTH TWICE A DAY AS NEEDED 2/21/20  Yes Provider, Historical   DULoxetine (Cymbalta) 30 mg capsule Take 1 Cap by mouth daily.  FOR 7 DAYS, THEN TAKE TWO DAILY  Indications: chronic muscle or bone pain 4/16/20  Yes Ambrocio Caballero MD   megestrol (MEGACE) 40 mg tablet TAKE 1 TABLET BY MOUTH EVERY DAY FOR 2 DAYS THEN 1 TABLET TWICE A DAY 12/30/19  Yes Provider, Historical   metFORMIN ER (GLUCOPHAGE XR) 500 mg tablet 1T w dinner x 1 wk, 1T BIDAC x 1 wk, 1T w breakfast and 2T w dinner x 1 wk, 2T BIDAC thereafter E 11.9 1/9/20  Yes Ambrocio Caballero MD   Blood-Glucose Meter (ACCU-CHEK GUIDE GLUCOSE METER) misc Check blood sugar 1-3 times daily or as directed by JESUS Price 11.9 10/4/19  Yes Ambrocio Caballero MD   glucose blood VI test strips (ACCU-CHEK GUIDE) strip Check blood sugar 1-3 times daily or as directed by JESUS Price 11.9 10/4/19  Yes Ambrocio Caballero MD   Blood Glucose Control High&Low (ACCU-CHEK GUIDE L1-L2 CTRL SOL) soln Check blood sugar 1-3 times daily or as directed by JESUS Price 11.9 10/4/19  Yes Ambrocio Caballero MD   lancets (ACCU-CHEK FASTCLIX LANCET DRUM) misc Check blood sugar 1-3 times daily or as directed by JESUS Price 11.9 10/4/19  Yes Ambrocio Caballero MD   ergocalciferol (ERGOCALCIFEROL) 50,000 unit capsule Take 1 Cap by mouth every seven (7) days. 9/25/19  Yes Stephen Cartagena MD   traZODone (DESYREL) 100 mg tablet Take 100 mg by mouth nightly. Patient takes 2 tablets at bedtime to equal 200 mg. Yes Provider, Historical   cyclobenzaprine (FLEXERIL) 10 mg tablet Take 1 Tab by mouth three (3) times daily as needed for Muscle Spasm(s). 7/14/19  Yes Yesi Bruce DO   valACYclovir (VALTREX) 500 mg tablet Take two po for 3 days 11/3/16  Yes Jacklyn Merlin, MD   fluocinoNIDE (LIDEX) 0.05 % topical cream  1/16/20 4/16/20  Provider, Historical   escitalopram oxalate (LEXAPRO) 20 mg tablet Take 2 Tabs by mouth daily. 11/18/19 4/16/20  Stephen Cartagena MD   cyclobenzaprine (FLEXERIL) 10 mg tablet Take 10 mg by mouth. 7/18/19 4/16/20  Provider, Historical   meloxicam (MOBIC) 15 mg tablet  7/27/19 4/16/20  Provider, Historical   medroxyPROGESTERone (PROVERA) 10 mg tablet Take  by mouth every twenty-eight (28) days. Takes every 28 days for 10 days. 4/16/20  Provider, Historical   medroxyPROGESTERone (PROVERA) 10 mg tablet Take 10 mg by mouth daily. 4/16/20  Other, MD Marta   norethindrone-ethinyl estradiol (JUNEL 1/20, 21,) 1-20 mg-mcg tab Take  by mouth.   4/16/20  Other, MD Marta   OTHER Birth control pills  4/16/20  Other, MD Marta     Allergies   Allergen Reactions    Aspirin Unknown (comments)       Patient Active Problem List   Diagnosis Code    LGSIL (low grade squamous intraepithelial lesion) on Pap smear [id number]    Obesity, morbid ([address] E66.01    PCOS (polycystic ovarian syndrome) E28.2    Depression F32.9    Type 2 diabetes mellitus ([address] E11.9    Abnormal weight gain R63.5    Anxiety F41.9    Irregular periods N92.6    Menorrhagia N92.0    Vitamin D deficiency E55.9     Past Medical History:   Diagnosis Date    Abnormal pap 1/2014+ 2/2015    LGSIL pap    Anemia NEC     Chronic pain     Depression     Depression     [facility]     HPV vaccine counseling     gardasil series complete     HSV-2 infection     PCOS (polycystic ovarian syndrome)     Prediabetes     Sleep disorder     sleep apnea    Vitamin D deficiency      Past Surgical History:   Procedure Laterality Date    HX COLPOSCOPY  2/2014 + 4/2015    YAHAIRA I; 2015 neg path     Family History   Problem Relation Age of Onset    No Known Problems Mother     Heart Disease Father     Diabetes Father      Social History     Tobacco Use    Smoking status: Never Smoker    Smokeless tobacco: Never Used   Substance Use Topics    Alcohol use: Yes     Alcohol/week: 0.0 standard drinks     Comment: socially          ROS  A 12 point review of systems was negative except as noted here or in the HPI. Objective:     General: alert, cooperative, no distress   Mental  status: mental status: alert, oriented to person, place, and time, normal mood, behavior, speech, dress, motor activity, and thought processes   Resp: resp: normal effort and no respiratory distress   Neuro: neuro: no gross deficits   Skin: skin: no discoloration or lesions of concern on visible areas     Due to this being a TeleHealth evaluation, many elements of the physical examination are unable to be assessed. We discussed the expected course, resolution and complications of the diagnosis(es) in detail. Medication risks, benefits, costs, interactions, and alternatives were discussed as indicated. I advised her to contact the office if her condition worsens, changes or fails to improve as anticipated. She expressed understanding with the diagnosis(es) and plan. Pursuant to the emergency declaration under the [facility] waiver authority and the [employer] and [employer] Act, this Virtual  Visit was conducted, with patient's consent, to reduce the patient's risk of exposure to COVID-19 and provide continuity of care for an established patient.      Services were provided through a video synchronous discussion virtually to substitute for in-person clinic visit.     Kezia Colón MD

## 2020-05-28 ENCOUNTER — VIRTUAL VISIT (OUTPATIENT)
Dept: FAMILY MEDICINE CLINIC | Age: 30
End: 2020-05-28

## 2020-05-28 VITALS — HEIGHT: 65 IN | BODY MASS INDEX: 52.09 KG/M2

## 2020-05-28 DIAGNOSIS — F32.A DEPRESSION, UNSPECIFIED DEPRESSION TYPE: ICD-10-CM

## 2020-05-28 DIAGNOSIS — G89.29 CHRONIC BILATERAL LOW BACK PAIN WITH RIGHT-SIDED SCIATICA: ICD-10-CM

## 2020-05-28 DIAGNOSIS — M54.41 CHRONIC BILATERAL LOW BACK PAIN WITH RIGHT-SIDED SCIATICA: ICD-10-CM

## 2020-05-28 DIAGNOSIS — E11.9 NEW ONSET TYPE 2 DIABETES MELLITUS (HCC): Primary | ICD-10-CM

## 2020-05-28 DIAGNOSIS — F41.9 ANXIETY: ICD-10-CM

## 2020-05-28 RX ORDER — DULOXETIN HYDROCHLORIDE 30 MG/1
30 CAPSULE, DELAYED RELEASE ORAL 2 TIMES DAILY
Qty: 60 CAP | Refills: 5 | Status: SHIPPED | OUTPATIENT
Start: 2020-05-28 | End: 2020-07-10 | Stop reason: SDUPTHER

## 2020-05-28 NOTE — PROGRESS NOTES
Magdalena Barcenas is a 31 y.o. female who was seen by synchronous (real-time) audio-video technology on 5/28/2020. Consent: Magdalena Barcenas, who was seen by synchronous (real-time) audio-video technology, and/or her healthcare decision maker, is aware that this patient-initiated, Telehealth encounter on 5/28/2020 is a billable service, with coverage as determined by her insurance carrier. She is aware that she may receive a bill and has provided verbal consent to proceed: Yes. Assessment & Plan:   1. New onset type 2 diabetes mellitus ([facility]  Great work with compliance and walking--fbg down in 100-120 range, go for labs as previously ordered    2. Depression, unspecified depression type  3. Anxiety  Improved, still with some insomnia but better mood and chronic pain symptoms on cymbalta. Ok to change dosing (can take 2 in am, 2 in pm or 1bid depending on how she finds it does or does not help with sleep or change her sleep pattern.) refills provided  - DULoxetine (Cymbalta) 30 mg capsule; Take 1 Cap by mouth two (2) times a day. FOR 7 DAYS, THEN TAKE TWO DAILY  Indications: chronic muscle or bone pain  Dispense: 60 Cap; Refill: 5          4. Chronic bilateral low back pain with right-sided sciatica  Improved w cymbalta, continue to follow with pain as well. - DULoxetine (Cymbalta) 30 mg capsule; Take 1 Cap by mouth two (2) times a day. FOR 7 DAYS, THEN TAKE TWO DAILY  Indications: chronic muscle or bone pain  Dispense: 60 Cap; Refill: 5    Go for labs, 4m follow up      Pt was counseled on risks, benefits and alternatives of treatment options. All questions were asked and answered and the patient was agreeable with the treatment plan as outlined. Time documented includes face to face time, documentation and chart review if applicable except as noted. Subjective:   Magdalena Barcenas is a 31 y.o. female who was seen for Depression (follow up );  Weight Management (follow up ); and Diabetes (follow up )      WEIGHT: patient thinks she's lost a little weight, she thinks her clothes are fitting looser than usual but she hasn't been able to check her weight, doesn't have a home scale. She said her mom thought her ace looked thinner. DEPRESSION: Started with cymbalta at last visit--she feels she sometimes is a little \"quiet\" and she feels drowsy and tired in the evening but she has trouble falling asleep still. She feels calm and notices that it is helping her mood a lot. She stopped taking the trazodone because it makes her very drowsy in the morning and it feels \"heavier\" and she only takes it on the weekend. She does remember to take her medications daily. She's compliant/consistent with it. DIABETES: she reports diabetic control has been doing well. Fasting blood sugars 100-120--remembering to take metformin, drinking water and less juice, exercising by walking and watching what she eats. Cutting out more breads/carbs    CHRONIC PAIN: has noticed some improvement with chronic pain (Back pain) since reaching a more steady state on the cymbalta 60mg dosing. Pain if sitting for more than 3 hours. Her job did become more flexible and she's able to take breaks that is helping her . Medications, allergies, PMH, PSH, SOCH, [address] [place] [address] reviewed and updated per routine protocol, see chart for review and changes if not noted here. ROS  A 12 point review of systems was negative except as noted here or in the HPI.     Objective:   Vital Signs: (As obtained by patient/caregiver at home)  Visit Vitals  Ht 5' 5\" (1.651 m)   BMI 52.09 kg/m²        [INSTRUCTIONS:  \"[x]\" Indicates a positive item  \"[]\" Indicates a negative item  -- DELETE ALL ITEMS NOT EXAMINED]    Constitutional: [x] Appears well-developed and well-nourished [x] No apparent distress      [] Abnormal -     Mental status: [x] Alert and awake  [x] Oriented to person/place/time [x] Able to follow commands    [] Abnormal -     Eyes:   EOM    [x]  Normal    [] Abnormal -   Sclera  [x]  Normal    [] Abnormal -          Discharge [x]  None visible   [] Abnormal -     HENT: [x] Normocephalic, atraumatic  [] Abnormal -   [x] Mouth/Throat: Mucous membranes are moist    External Ears [x] Normal  [] Abnormal -    Neck: [x] No visualized mass [] Abnormal -     Pulmonary/Chest: [x] Respiratory effort normal   [x] No visualized signs of difficulty breathing or respiratory distress        [] Abnormal -      Musculoskeletal:   [x] Normal gait with no signs of ataxia         [x] Normal range of motion of neck        [] Abnormal -     Neurological:        [x] No Facial Asymmetry (Cranial nerve 7 motor function) (limited exam due to video visit)          [x] No gaze palsy        [] Abnormal -          Skin:        [x] No significant exanthematous lesions or discoloration noted on facial skin         [] Abnormal -            Psychiatric:       [x] Normal Affect [] Abnormal -        [x] No Hallucinations    Other pertinent observable physical exam findings:none apparent, morbidly obese, ambulates in home without difficulty, speaking in full sentences    We discussed the expected course, resolution and complications of the diagnosis(es) in detail. Medication risks, benefits, costs, interactions, and alternatives were discussed as indicated. I advised her to contact the office if her condition worsens, changes or fails to improve as anticipated. She expressed understanding with the diagnosis(es) and plan. Neha Mcmahan is a 31 y.o. female who was evaluated by a video visit encounter for concerns as above. Patient identification was verified prior to start of the visit. A caregiver was present when appropriate. Due to this being a TeleHealth encounter (During [place] [address] public [facility] emergency), evaluation of the following organ systems was limited: Vitals/Constitutional/EENT/Resp/CV/GI//MS/Neuro/Skin/Heme-Lymph-Imm.   Pursuant to the emergency declaration under the [address] and the [employer] Act, [facility] waiver authority and the Coronavirus Preparedness and [employer] Act, this Virtual  Visit was conducted, with patient's (and/or legal guardian's) consent, to reduce the patient's risk of exposure to COVID-19 and provide necessary medical care. Services were provided through a video synchronous discussion virtually to substitute for in-person clinic visit. Patient and provider were located at their individual homes. Odilia Patton MD  [facility]  05/28/20 1:17 PM     Portions of this note may have been populated using smart dictation software and may have \"sounds-like\" errors present.

## 2020-07-06 ENCOUNTER — HOSPITAL ENCOUNTER (OUTPATIENT)
Dept: LAB | Age: 30
Discharge: HOME | End: 2020-07-06

## 2020-07-06 DIAGNOSIS — E11.9 NEW ONSET TYPE 2 DIABETES MELLITUS (HCC): ICD-10-CM

## 2020-07-06 LAB
25(OH)D3 SERPL-MCNC: 20.7 NG/ML (ref 30–100)
EST. AVERAGE GLUCOSE BLD GHB EST-MCNC: 151 MG/DL
HBA1C MFR BLD: 6.9 % (ref 4–5.6)

## 2020-07-06 RX ORDER — ERGOCALCIFEROL 1.25 MG/1
50000 CAPSULE ORAL
Qty: 14 CAP | Refills: 0 | Status: SHIPPED | OUTPATIENT
Start: 2020-07-06 | End: 2021-10-11 | Stop reason: ALTCHOICE

## 2020-07-07 NOTE — PROGRESS NOTES
Vit d improving but not sufficient--sending once weekly replacement to your pharmacy for you to continue

## 2020-07-08 LAB — PAP SMEAR, EXTERNAL: NORMAL

## 2020-07-10 DIAGNOSIS — F32.A DEPRESSION, UNSPECIFIED DEPRESSION TYPE: ICD-10-CM

## 2020-07-10 DIAGNOSIS — M54.41 CHRONIC BILATERAL LOW BACK PAIN WITH RIGHT-SIDED SCIATICA: ICD-10-CM

## 2020-07-10 DIAGNOSIS — G89.29 CHRONIC BILATERAL LOW BACK PAIN WITH RIGHT-SIDED SCIATICA: ICD-10-CM

## 2020-07-10 RX ORDER — DULOXETIN HYDROCHLORIDE 60 MG/1
60 CAPSULE, DELAYED RELEASE ORAL DAILY
Qty: 90 CAP | Refills: 1 | Status: SHIPPED | OUTPATIENT
Start: 2020-07-10 | End: 2020-09-02 | Stop reason: SDUPTHER

## 2020-07-13 RX ORDER — TRAZODONE HYDROCHLORIDE 100 MG/1
100 TABLET ORAL
Qty: 90 TAB | Refills: 0 | Status: SHIPPED | OUTPATIENT
Start: 2020-07-13 | End: 2020-08-11

## 2020-07-13 NOTE — TELEPHONE ENCOUNTER
----- Message from Atif Monaco sent at 7/11/2020  6:48 PM EDT -----  Regarding: Prescription Question  Contact: [phone]  Can I get a refill on my trazodone

## 2020-07-30 RX ORDER — METFORMIN HYDROCHLORIDE 500 MG/1
1000 TABLET, EXTENDED RELEASE ORAL
Qty: 120 TAB | Refills: 2 | Status: SHIPPED | OUTPATIENT
Start: 2020-07-30 | End: 2020-07-31 | Stop reason: SDUPTHER

## 2020-07-31 RX ORDER — METFORMIN HYDROCHLORIDE 500 MG/1
1000 TABLET, EXTENDED RELEASE ORAL
Qty: 180 TAB | Refills: 2 | Status: SHIPPED | OUTPATIENT
Start: 2020-07-31 | End: 2020-08-10 | Stop reason: RX

## 2020-08-06 ENCOUNTER — TELEPHONE (OUTPATIENT)
Dept: FAMILY MEDICINE CLINIC | Age: 30
End: 2020-08-06

## 2020-08-07 NOTE — TELEPHONE ENCOUNTER
Can you call to clarify? Is this insurance or is this due to backorder? Do they have metformin er 750?

## 2020-08-10 RX ORDER — METFORMIN HYDROCHLORIDE 750 MG/1
750 TABLET, EXTENDED RELEASE ORAL
Qty: 180 TAB | Refills: 1 | Status: SHIPPED | OUTPATIENT
Start: 2020-08-10 | End: 2021-05-14 | Stop reason: SDUPTHER

## 2020-08-10 NOTE — TELEPHONE ENCOUNTER
Notify patient I sent metformin 750er instead, take 2x a day with meals (or, its OK to take two tabs with dinner if she prefers)

## 2020-08-11 RX ORDER — TRAZODONE HYDROCHLORIDE 100 MG/1
TABLET ORAL
Qty: 60 TAB | Refills: 1 | Status: SHIPPED | OUTPATIENT
Start: 2020-08-11 | End: 2020-09-08 | Stop reason: SDUPTHER

## 2020-09-02 DIAGNOSIS — F32.A DEPRESSION, UNSPECIFIED DEPRESSION TYPE: ICD-10-CM

## 2020-09-02 DIAGNOSIS — M54.41 CHRONIC BILATERAL LOW BACK PAIN WITH RIGHT-SIDED SCIATICA: ICD-10-CM

## 2020-09-02 DIAGNOSIS — G89.29 CHRONIC BILATERAL LOW BACK PAIN WITH RIGHT-SIDED SCIATICA: ICD-10-CM

## 2020-09-02 RX ORDER — DULOXETIN HYDROCHLORIDE 60 MG/1
60 CAPSULE, DELAYED RELEASE ORAL DAILY
Qty: 90 CAP | Refills: 1 | Status: SHIPPED | OUTPATIENT
Start: 2020-09-02 | End: 2021-05-25

## 2020-09-08 NOTE — TELEPHONE ENCOUNTER
90 day refill request for Trazodone from [facility].  Ldm

## 2020-09-09 RX ORDER — TRAZODONE HYDROCHLORIDE 100 MG/1
TABLET ORAL
Qty: 180 TAB | Refills: 1 | Status: SHIPPED | OUTPATIENT
Start: 2020-09-09 | End: 2021-07-22

## 2020-09-24 ENCOUNTER — VIRTUAL VISIT (OUTPATIENT)
Dept: FAMILY MEDICINE CLINIC | Age: 30
End: 2020-09-24
Payer: COMMERCIAL

## 2020-09-24 DIAGNOSIS — F32.A DEPRESSION, UNSPECIFIED DEPRESSION TYPE: ICD-10-CM

## 2020-09-24 DIAGNOSIS — E55.9 VITAMIN D DEFICIENCY: ICD-10-CM

## 2020-09-24 DIAGNOSIS — M54.41 CHRONIC BILATERAL LOW BACK PAIN WITH RIGHT-SIDED SCIATICA: ICD-10-CM

## 2020-09-24 DIAGNOSIS — G89.29 CHRONIC BILATERAL LOW BACK PAIN WITH RIGHT-SIDED SCIATICA: ICD-10-CM

## 2020-09-24 DIAGNOSIS — F41.9 ANXIETY: ICD-10-CM

## 2020-09-24 DIAGNOSIS — E11.9 NEW ONSET TYPE 2 DIABETES MELLITUS (HCC): Primary | ICD-10-CM

## 2020-09-24 PROCEDURE — 99214 OFFICE O/P EST MOD 30 MIN: CPT | Performed by: FAMILY MEDICINE

## 2020-09-24 RX ORDER — LETROZOLE 2.5 MG/1
2.5 TABLET, FILM COATED ORAL DAILY
COMMUNITY
End: 2020-12-07

## 2020-09-24 NOTE — PROGRESS NOTES
Chief Complaint   Patient presents with    Depression    Diabetes     Follow up. 1. Have you been to the ER, urgent care clinic since your last visit? Hospitalized since your last visit? No    2. Have you seen or consulted any other health care providers outside of the [address] [place] [address] since your last visit? Include any pap smears or colon screening.  No

## 2020-09-24 NOTE — PROGRESS NOTES
Mamta Feliz is a 31 y.o. female who was seen by synchronous (real-time) audio-video technology on 9/24/2020. Consent: Mamta Feliz, who was seen by synchronous (real-time) audio-video technology, and/or her healthcare decision maker, is aware that this patient-initiated, Telehealth encounter on 9/24/2020 is a billable service, with coverage as determined by her insurance carrier. She is aware that she may receive a bill and has provided verbal consent to proceed: Yes. Assessment & Plan:   1. New onset type 2 diabetes mellitus ([facility]  Due for a1c recheck, reported levels sound really great, expect good control, lab ordered  - HEMOGLOBIN A1C WITH EAG; Future    2. Vitamin D deficiency  Due for vit d recheck, was quite low last year, coming up slowly, pt compliant, lab per orders  - VITAMIN D, 25 HYDROXY; Future    3. Depression, unspecified depression type  cymbalta helps but mood swings now may be due to medications being used while TTC, will continue to monitor but no change to medications today    4. Chronic bilateral low back pain with right-sided sciatica  Mgmt per her specialist. Reports today out of work d/t back pain and pending MRI, might experience exacerbation in pregnancy, will want to watch closely    5. Anxiety  As above          Pt was counseled on risks, benefits and alternatives of treatment options. All questions were asked and answered and the patient was agreeable with the treatment plan as outlined.       Subjective:   Mamta Feliz is a 31 y.o. female who was seen for Depression and Diabetes (Follow up.)      Patient reports that she is feeling more emotional recently  She just started trying for a baby with her  and its the first few months with her cycle being regular  Feels the cymbalta has helped improved her mood a lot  Her back doctor took her out of work  She had some injections in June and it made her back pain work, she has an upcoming MRI to recheck everything from When Outside In The Sun, Do You...: mostly burns, rarely tans that standpoint. cehcking her sugars at home  Running 106 to 115 in the morning  She knows this is great  She is taking an over the counter supplement to help with ovulation that has helped her sugar control and she's noticing that is improving from her cycle as well as her sugar    Medications, allergies, PMH, PSH, SOCH, [address] reviewed and updated per routine protocol, see chart for review and changes if not noted here. ROS  A 12 point review of systems was negative except as noted here or in the HPI.     Objective:   Vital Signs: (As obtained by patient/caregiver at home)  Patient-Reported Vitals 9/24/2020   Patient-Reported Weight 313lb   Patient-Reported Systolic  (No Data)   Patient-Reported LMP 09/02/2020        [INSTRUCTIONS:  \"[x]\" Indicates a positive item  \"[]\" Indicates a negative item  -- DELETE ALL ITEMS NOT EXAMINED]    Constitutional: [x] Appears well-developed and well-nourished [x] No apparent distress      [] Abnormal -     Mental status: [x] Alert and awake  [x] Oriented to person/place/time [x] Able to follow commands    [] Abnormal -     Eyes:   EOM    [x]  Normal    [] Abnormal -   Sclera  [x]  Normal    [] Abnormal -          Discharge [x]  None visible   [] Abnormal -     HENT: [x] Normocephalic, atraumatic  [] Abnormal -   [x] Mouth/Throat: Mucous membranes are moist    External Ears [x] Normal  [] Abnormal -    Neck: [x] No visualized mass [] Abnormal -     Pulmonary/Chest: [x] Respiratory effort normal   [x] No visualized signs of difficulty breathing or respiratory distress        [] Abnormal -      Musculoskeletal:   [] Normal gait with no signs of ataxia         [x] Normal range of motion of neck        [] Abnormal -     Neurological:        [x] No Facial Asymmetry (Cranial nerve 7 motor function) (limited exam due to video visit)          [x] No gaze palsy        [] Abnormal -          Skin:        [x] No significant exanthematous lesions or discoloration noted on facial skin [] Abnormal -            Psychiatric:       [x] Normal Affect [] Abnormal -        [x] No Hallucinations    Other pertinent observable physical exam findings:well appearing    We discussed the expected course, resolution and complications of the diagnosis(es) in detail. Medication risks, benefits, costs, interactions, and alternatives were discussed as indicated. I advised her to contact the office if her condition worsens, changes or fails to improve as anticipated. She expressed understanding with the diagnosis(es) and plan. Jessica Corcoran is a 31 y.o. female who was evaluated by a video visit encounter for concerns as above. Patient identification was verified prior to start of the visit. A caregiver was present when appropriate. Due to this being a TeleHealth encounter (During [facility] public health emergency), evaluation of the following organ systems was limited: Vitals/Constitutional/EENT/Resp/CV/GI//MS/Neuro/Skin/Heme-Lymph-Imm. Pursuant to the emergency declaration under the [facility], [facility] waiver authority and the [employer] and [employer] Act, this Virtual  Visit was conducted, with patient's (and/or legal guardian's) consent, to reduce the patient's risk of exposure to COVID-19 and provide necessary medical care. Services were provided through a video synchronous discussion virtually to substitute for in-person clinic visit. Patient and provider were located at their individual homes. Kevin Raymond MD  [facility]  09/24/20 1:10 PM     Portions of this note may have been populated using smart dictation software and may have \"sounds-like\" errors present.

## 2020-10-16 DIAGNOSIS — E11.9 NEW ONSET TYPE 2 DIABETES MELLITUS (HCC): ICD-10-CM

## 2020-10-16 DIAGNOSIS — E55.9 VITAMIN D DEFICIENCY: ICD-10-CM

## 2020-10-16 LAB
25(OH)D3 SERPL-MCNC: 31.2 NG/ML (ref 30–100)
EST. AVERAGE GLUCOSE BLD GHB EST-MCNC: 186 MG/DL
HBA1C MFR BLD: 8.1 % (ref 4–5.6)

## 2020-10-19 NOTE — PROGRESS NOTES
Please make a follow up appointment to discuss diabetes, it is much worse from our last check and we should address it    Vitamin D is in the normal range

## 2020-12-07 ENCOUNTER — VIRTUAL VISIT (OUTPATIENT)
Dept: FAMILY MEDICINE CLINIC | Age: 30
End: 2020-12-07
Payer: COMMERCIAL

## 2020-12-07 ENCOUNTER — TELEPHONE (OUTPATIENT)
Dept: FAMILY MEDICINE CLINIC | Age: 30
End: 2020-12-07

## 2020-12-07 DIAGNOSIS — E11.9 TYPE 2 DIABETES MELLITUS NOT AT GOAL (HCC): Primary | ICD-10-CM

## 2020-12-07 DIAGNOSIS — G89.29 OTHER CHRONIC PAIN: ICD-10-CM

## 2020-12-07 DIAGNOSIS — F32.A DEPRESSION, UNSPECIFIED DEPRESSION TYPE: ICD-10-CM

## 2020-12-07 DIAGNOSIS — Z34.91 FIRST TRIMESTER PREGNANCY: ICD-10-CM

## 2020-12-07 DIAGNOSIS — F41.9 ANXIETY: ICD-10-CM

## 2020-12-07 PROCEDURE — 3052F HG A1C>EQUAL 8.0%<EQUAL 9.0%: CPT | Performed by: FAMILY MEDICINE

## 2020-12-07 PROCEDURE — 99214 OFFICE O/P EST MOD 30 MIN: CPT | Performed by: FAMILY MEDICINE

## 2020-12-07 NOTE — PROGRESS NOTES
Sarthak James is a 31 y.o. female who was seen by synchronous (real-time) audio-video technology on 12/7/2020. Consent: Sarthak James, who was seen by synchronous (real-time) audio-video technology, and/or her healthcare decision maker, is aware that this patient-initiated, Telehealth encounter on 12/7/2020 is a billable service, with coverage as determined by her insurance carrier. She is aware that she may receive a bill and has provided verbal consent to proceed: Yes. Assessment & Plan:   1. Type 2 diabetes mellitus not at goal [facility])  For now recommend stay on metformin and start checking blood sugars 2 times a day  We want data from :  Fasting, before lunch, before dinner and at bedtime  Log 2 of these a day (alternating, so F/D on one day and L/B on another day)  Bring me these numbers in about 2 weeks, we'll see how control is. It was not optimal at last visit  It is likely we'll add lantus at next visit and then MFM or Diabetes care through her ob will usually take over from there and she will likely transition off of the metformin as she moves forward    2. Depression, unspecified depression type  I recommend she talk with reproductive psych about her concerns. There is risk of non-teratogenic associated concerns to the use of SSRI/SNRI (Effexor being the one we deliberately try to avoid based on data) however mom's safety and mental health is PARAMOUNT and we balance this. ACOG's position is that these decisions are not black and white but need to be individually tailored to each patient  Transition from trazodone to doxylamine might be possible during pregnancy however  - REFERRAL TO PSYCHIATRY    3. Anxiety  As above  - REFERRAL TO PSYCHIATRY    4. Other chronic pain  As above  - REFERRAL TO PSYCHIATRY    5. First trimester pregnancy  MGMT per Dr Misty Lopez  Compliance with prenatal vitamin  - REFERRAL TO PSYCHIATRY          Pt was counseled on risks, benefits and alternatives of treatment options. All questions were asked and answered and the patient was agreeable with the treatment plan as outlined. Encounter time today was 25 minutes and more than 50% of this encounter was spent in counseling face-to-face regarding Diagnosis, Patient Education, Medication Management, Compliance and Impressions. Time documented includes face to face time, documentation and chart review if applicable except as noted. Subjective:   Nadeem Ratliff is a 31 y.o. female who was seen for Diabetes (Follow up) and Medication Evaluation    LMP oct 28  Positive home pregnancy test was last week    The patient reports that after she found out that her a1c got worse it took her a while to get back on track so she hasn't been checking her sugars she reports to me  188 today, she did not eat breakfast but she has been eating some crackers and peanut butter    On a prenatal vitamin with iron in it, seeing Dr Marcellus James at the end of this month    Wants to know whether it is ok to stay on her cymbalta, she's very hesitant to come off of it  Would be ok to trial coming off of trazodone however      Medications, allergies, PMH, PSH, SOCH, MADD[facility]Z [facility] reviewed and updated per routine protocol, see chart for review and changes if not noted here. ROS  A 12 point review of systems was negative except as noted here or in the HPI.     Objective:   Vital Signs: (As obtained by patient/caregiver at home)  Patient-Reported Vitals 12/7/2020   Patient-Reported Weight 313lb   Patient-Reported Height 55   Patient-Reported Systolic  -   Patient-Reported LMP 10/28/2020        [INSTRUCTIONS:  \"[x]\" Indicates a positive item  \"[]\" Indicates a negative item  -- DELETE ALL ITEMS NOT EXAMINED]    Constitutional: [x] Appears well-developed and well-nourished [x] No apparent distress      [] Abnormal -     Mental status: [x] Alert and awake  [x] Oriented to person/place/time [x] Able to follow commands    [] Abnormal -     Eyes:   EOM    [x]  Normal    [] Abnormal - Sclera  [x]  Normal    [] Abnormal -          Discharge [x]  None visible   [] Abnormal -     HENT: [x] Normocephalic, atraumatic  [] Abnormal -   [x] Mouth/Throat: Mucous membranes are moist    External Ears [x] Normal  [] Abnormal -    Neck: [x] No visualized mass [] Abnormal -     Pulmonary/Chest: [x] Respiratory effort normal   [x] No visualized signs of difficulty breathing or respiratory distress        [] Abnormal -      Musculoskeletal:   [] Normal gait with no signs of ataxia         [x] Normal range of motion of neck        [] Abnormal -     Neurological:        [x] No Facial Asymmetry (Cranial nerve 7 motor function) (limited exam due to video visit)          [x] No gaze palsy        [] Abnormal -          Skin:        [x] No significant exanthematous lesions or discoloration noted on facial skin         [] Abnormal -            Psychiatric:       [x] Normal Affect [] Abnormal -        [x] No Hallucinations    Other pertinent observable physical exam findings:seated at dest, well appearing, no distress    We discussed the expected course, resolution and complications of the diagnosis(es) in detail. Medication risks, benefits, costs, interactions, and alternatives were discussed as indicated. I advised her to contact the office if her condition worsens, changes or fails to improve as anticipated. She expressed understanding with the diagnosis(es) and plan. Mely Esparza is a 31 y.o. female who was evaluated by a video visit encounter for concerns as above. Patient identification was verified prior to start of the visit. A caregiver was present when appropriate. Due to this being a TeleHealth encounter (During [facility]- public [facility] emergency), evaluation of the following organ systems was limited: Vitals/Constitutional/EENT/Resp/CV/GI//MS/Neuro/Skin/Heme-Lymph-Imm.   Pursuant to the emergency declaration under the [facility] waiver authority and the Coronavirus Preparedness and Response Supplemental Appropriations Act, this Virtual  Visit was conducted, with patient's (and/or legal guardian's) consent, to reduce the patient's risk of exposure to COVID-19 and provide necessary medical care. Services were provided through a video synchronous discussion virtually to substitute for in-person clinic visit. Patient and provider were located at their individual homes. Carlos Templeton MD  [facility]  12/07/20 1:06 PM     Portions of this note may have been populated using smart dictation software and may have \"sounds-like\" errors present.

## 2020-12-07 NOTE — PROGRESS NOTES
Chief Complaint   Patient presents with    Diabetes     Follow up    Medication Evaluation   1. Have you been to the ER, urgent care clinic since your last visit? Hospitalized since your last visit? No  2. Have you seen or consulted any other health care providers outside of the [address] [place] [address] since your last visit? Include any pap smears or colon screening.  No

## 2020-12-07 NOTE — TELEPHONE ENCOUNTER
----- Message from Glen Alvarado MD sent at 12/7/2020  1:32 PM EST -----  Regarding: pls schedule  1:15  12/21  Vv  Dm  Mood  Med check

## 2020-12-12 ENCOUNTER — APPOINTMENT (OUTPATIENT)
Dept: ULTRASOUND IMAGING | Age: 30
End: 2020-12-12
Attending: STUDENT IN AN ORGANIZED HEALTH CARE EDUCATION/TRAINING PROGRAM
Payer: COMMERCIAL

## 2020-12-12 ENCOUNTER — HOSPITAL ENCOUNTER (EMERGENCY)
Age: 30
Discharge: HOME | End: 2020-12-12
Attending: STUDENT IN AN ORGANIZED HEALTH CARE EDUCATION/TRAINING PROGRAM
Payer: COMMERCIAL

## 2020-12-12 VITALS
RESPIRATION RATE: 18 BRPM | TEMPERATURE: 98 F | DIASTOLIC BLOOD PRESSURE: 79 MMHG | WEIGHT: 293 LBS | SYSTOLIC BLOOD PRESSURE: 143 MMHG | OXYGEN SATURATION: 98 % | HEIGHT: 65 IN | BODY MASS INDEX: 48.82 KG/M2 | HEART RATE: 116 BPM

## 2020-12-12 DIAGNOSIS — O20.0 THREATENED MISCARRIAGE: Primary | ICD-10-CM

## 2020-12-12 LAB
ABO + RH BLD: NORMAL
ALBUMIN SERPL-MCNC: 3.1 G/DL (ref 3.5–5)
ALBUMIN/GLOB SERPL: 0.7 {RATIO} (ref 1.1–2.2)
ALP SERPL-CCNC: 97 U/L (ref 45–117)
ALT SERPL-CCNC: 22 U/L (ref 12–78)
ANION GAP SERPL CALC-SCNC: 3 MMOL/L (ref 5–15)
APPEARANCE UR: CLEAR
AST SERPL-CCNC: 12 U/L (ref 15–37)
BACTERIA URNS QL MICRO: NEGATIVE /HPF
BASOPHILS # BLD: 0.1 K/UL (ref 0–0.1)
BASOPHILS NFR BLD: 0 % (ref 0–1)
BILIRUB SERPL-MCNC: 0.2 MG/DL (ref 0.2–1)
BILIRUB UR QL: NEGATIVE
BLOOD BANK CMNT PATIENT-IMP: NORMAL
BUN SERPL-MCNC: 8 MG/DL (ref 6–20)
BUN/CREAT SERPL: 11 (ref 12–20)
CALCIUM SERPL-MCNC: 8.9 MG/DL (ref 8.5–10.1)
CHLORIDE SERPL-SCNC: 104 MMOL/L (ref 97–108)
CO2 SERPL-SCNC: 30 MMOL/L (ref 21–32)
COLOR UR: ABNORMAL
COMMENT, HOLDF: NORMAL
CREAT SERPL-MCNC: 0.71 MG/DL (ref 0.55–1.02)
DIFFERENTIAL METHOD BLD: ABNORMAL
EOSINOPHIL # BLD: 0.3 K/UL (ref 0–0.4)
EOSINOPHIL NFR BLD: 2 % (ref 0–7)
EPITH CASTS URNS QL MICRO: ABNORMAL /LPF
ERYTHROCYTE [DISTWIDTH] IN BLOOD BY AUTOMATED COUNT: 14.8 % (ref 11.5–14.5)
GLOBULIN SER CALC-MCNC: 4.3 G/DL (ref 2–4)
GLUCOSE SERPL-MCNC: 194 MG/DL (ref 65–100)
GLUCOSE UR STRIP.AUTO-MCNC: NEGATIVE MG/DL
HCG SERPL-ACNC: 157 MIU/ML (ref 0–6)
HCT VFR BLD AUTO: 35.5 % (ref 35–47)
HGB BLD-MCNC: 11.5 G/DL (ref 11.5–16)
HGB UR QL STRIP: ABNORMAL
IMM GRANULOCYTES # BLD AUTO: 0.1 K/UL (ref 0–0.04)
IMM GRANULOCYTES NFR BLD AUTO: 0 % (ref 0–0.5)
KETONES UR QL STRIP.AUTO: NEGATIVE MG/DL
LEUKOCYTE ESTERASE UR QL STRIP.AUTO: ABNORMAL
LIPASE SERPL-CCNC: 59 U/L (ref 73–393)
LYMPHOCYTES # BLD: 4.5 K/UL (ref 0.8–3.5)
LYMPHOCYTES NFR BLD: 27 % (ref 12–49)
MCH RBC QN AUTO: 27.9 PG (ref 26–34)
MCHC RBC AUTO-ENTMCNC: 32.4 G/DL (ref 30–36.5)
MCV RBC AUTO: 86.2 FL (ref 80–99)
MONOCYTES # BLD: 0.9 K/UL (ref 0–1)
MONOCYTES NFR BLD: 5 % (ref 5–13)
NEUTS SEG # BLD: 10.9 K/UL (ref 1.8–8)
NEUTS SEG NFR BLD: 66 % (ref 32–75)
NITRITE UR QL STRIP.AUTO: NEGATIVE
NRBC # BLD: 0 K/UL (ref 0–0.01)
NRBC BLD-RTO: 0 PER 100 WBC
PH UR STRIP: 7 [PH] (ref 5–8)
PLATELET # BLD AUTO: 388 K/UL (ref 150–400)
PMV BLD AUTO: 9.2 FL (ref 8.9–12.9)
POTASSIUM SERPL-SCNC: 3.6 MMOL/L (ref 3.5–5.1)
PROT SERPL-MCNC: 7.4 G/DL (ref 6.4–8.2)
PROT UR STRIP-MCNC: NEGATIVE MG/DL
RBC # BLD AUTO: 4.12 M/UL (ref 3.8–5.2)
RBC #/AREA URNS HPF: ABNORMAL /HPF (ref 0–5)
SAMPLES BEING HELD,HOLD: NORMAL
SODIUM SERPL-SCNC: 137 MMOL/L (ref 136–145)
SP GR UR REFRACTOMETRY: 1.01 (ref 1–1.03)
UR CULT HOLD, URHOLD: NORMAL
UROBILINOGEN UR QL STRIP.AUTO: 0.2 EU/DL (ref 0.2–1)
WBC # BLD AUTO: 16.7 K/UL (ref 3.6–11)
WBC URNS QL MICRO: ABNORMAL /HPF (ref 0–4)

## 2020-12-12 PROCEDURE — 99284 EMERGENCY DEPT VISIT MOD MDM: CPT

## 2020-12-12 PROCEDURE — 86900 BLOOD TYPING SEROLOGIC ABO: CPT

## 2020-12-12 PROCEDURE — 80053 COMPREHEN METABOLIC PANEL: CPT

## 2020-12-12 PROCEDURE — 83690 ASSAY OF LIPASE: CPT

## 2020-12-12 PROCEDURE — 76817 TRANSVAGINAL US OBSTETRIC: CPT

## 2020-12-12 PROCEDURE — 84702 CHORIONIC GONADOTROPIN TEST: CPT

## 2020-12-12 PROCEDURE — 76801 OB US < 14 WKS SINGLE FETUS: CPT

## 2020-12-12 PROCEDURE — 81001 URINALYSIS AUTO W/SCOPE: CPT

## 2020-12-12 PROCEDURE — 36415 COLL VENOUS BLD VENIPUNCTURE: CPT

## 2020-12-12 PROCEDURE — 85025 COMPLETE CBC W/AUTO DIFF WBC: CPT

## 2020-12-12 NOTE — ED TRIAGE NOTES
Patient repots one episode of bleeding about 20 minutes ago. Patient about 6 weeks pregnant, states \"it was more thank just spotting\". Patient adds some cramping earlier today.

## 2020-12-13 NOTE — DISCHARGE INSTRUCTIONS
It is important that you follow-up with your OB/GYN on Monday to have repeat blood test drawn. Given your low hCG level it is likely that this is due to an ongoing miscarriage although it is possible that fetal dates do not correspond properly to your last menstrual period or you have an early ectopic pregnancy. Your OB/GYN will be able to figure out which of these is the case based on your second hCG level.

## 2020-12-13 NOTE — ED PROVIDER NOTES
William Dodge is a 31 y.o. female G1, P0, Patient's last menstrual period was 10/28/2020 (exact date). with past medical history notable for chronic pain, diabetes, HSV-2, PCOS presenting with vaginal bleeding in early pregnancy. States that she developed bleeding and lower abdominal cramping earlier today, had a moderate amount of blood (\"enough to see on my underwear \") but no tissue or clots passed. She has not had vomiting, lightheadedness. Denies weakness. No history of ectopic pregnancy or any other pregnancies in the past.  She denies trauma. Past Medical History:   Diagnosis Date    Abnormal pap 1/2014+ 2/2015    LGSIL pap    Anemia NEC     Chronic pain     Depression     Depression     Diabetes (HCC)     HPV vaccine counseling     gardasil series complete     HSV-2 infection     PCOS (polycystic ovarian syndrome)     Prediabetes     Sleep disorder     sleep apnea    Vitamin D deficiency        Past Surgical History:   Procedure Laterality Date    HX COLPOSCOPY  2/2014 + 4/2015    YAHAIRA I; 2015 neg path         Family History:   Problem Relation Age of Onset    No Known Problems Mother     Heart Disease Father     Diabetes Father        Social History     Socioeconomic History    Marital status:      Spouse name: Not on file    Number of children: Not on file    Years of education: Not on file    Highest education level: Not on file   Occupational History    Not on file   Social Needs    Financial resource strain: Not on file    Food insecurity     Worry: Not on file     Inability: Not on file    Transportation needs     Medical: Not on file     Non-medical: Not on file   Tobacco Use    Smoking status: Never Smoker    Smokeless tobacco: Never Used   Substance and Sexual Activity    Alcohol use:  Yes     Alcohol/week: 0.0 standard drinks     Comment: socially    Drug use: Not on file    Sexual activity: Yes     Partners: Male     Birth control/protection: Condom   Lifestyle    Physical activity     Days per week: Not on file     Minutes per session: Not on file    Stress: Not on file   Relationships    Social connections     Talks on phone: Not on file     Gets together: Not on file     Attends Orthodoxy service: Not on file     Active member of club or organization: Not on file     Attends meetings of clubs or organizations: Not on file     Relationship status: Not on file    Intimate partner violence     Fear of current or ex partner: Not on file     Emotionally abused: Not on file     Physically abused: Not on file     Forced sexual activity: Not on file   Other Topics Concern    Not on file   Social History Narrative    ** Merged History Encounter **              ALLERGIES: Aspirin    Review of Systems   Constitutional: Negative for chills and fever. Eyes: Negative for photophobia. Respiratory: Negative for shortness of breath. Cardiovascular: Negative for chest pain. Gastrointestinal: Negative for abdominal pain. Genitourinary: Positive for vaginal bleeding. Negative for dysuria. Musculoskeletal: Negative for back pain. Neurological: Negative for headaches. Psychiatric/Behavioral: Negative for confusion. All other systems reviewed and are negative. Vitals:    12/12/20 1701 12/12/20 1710 12/12/20 1721   BP: (!) 157/98 134/81    Pulse: (!) 116     Resp: 18     Temp: 98 °F (36.7 °C)     SpO2: 98% 98% 98%   Weight: 148.8 kg (328 lb)     Height: 5' 5\" (1.651 m)              Physical Exam  Vitals signs reviewed. Constitutional:       General: She is not in acute distress. Appearance: She is obese. HENT:      Head: Normocephalic and atraumatic. Mouth/Throat:      Mouth: Mucous membranes are moist.      Pharynx: Oropharynx is clear. Cardiovascular:      Rate and Rhythm: Normal rate and regular rhythm. Heart sounds: Normal heart sounds.    Pulmonary:      Effort: Pulmonary effort is normal.      Breath sounds: Normal breath sounds. Abdominal:      Tenderness: There is abdominal tenderness. There is no guarding or rebound. Comments: Mild suprapubic tenderness without rebound or guarding. Musculoskeletal: Normal range of motion. Right lower leg: No edema. Left lower leg: No edema. Skin:     General: Skin is warm and dry. Capillary Refill: Capillary refill takes less than 2 seconds. Neurological:      General: No focal deficit present. Mental Status: She is alert and oriented to person, place, and time. Psychiatric:         Mood and Affect: Mood normal.          MDM  Number of Diagnoses or Management Options  Diagnosis management comments: Pleasant 29-year-old woman with early pregnancy with concern for threatened miscarriage. She does not have an IUP visible on her ultrasound. Therefore she was advised that she likely has a miscarriage in progress but she will need to follow-up with her OB/GYN in 2 days to have repeat hCG levels drawn to confirm that she does not have bleeding associated with a nonvisualized (unruptured) ectopic. There is no evidence of ruptured ectopic on current imaging. This was explained in detail to the patient.          Procedures

## 2020-12-21 ENCOUNTER — VIRTUAL VISIT (OUTPATIENT)
Dept: FAMILY MEDICINE CLINIC | Age: 30
End: 2020-12-21
Payer: COMMERCIAL

## 2020-12-21 DIAGNOSIS — F32.A DEPRESSION, UNSPECIFIED DEPRESSION TYPE: ICD-10-CM

## 2020-12-21 DIAGNOSIS — F41.9 ANXIETY: ICD-10-CM

## 2020-12-21 DIAGNOSIS — E11.9 TYPE 2 DIABETES MELLITUS NOT AT GOAL (HCC): ICD-10-CM

## 2020-12-21 DIAGNOSIS — O20.0 THREATENED MISCARRIAGE IN EARLY PREGNANCY: Primary | ICD-10-CM

## 2020-12-21 PROCEDURE — 3052F HG A1C>EQUAL 8.0%<EQUAL 9.0%: CPT | Performed by: FAMILY MEDICINE

## 2020-12-21 PROCEDURE — 99214 OFFICE O/P EST MOD 30 MIN: CPT | Performed by: FAMILY MEDICINE

## 2020-12-21 NOTE — PROGRESS NOTES
Demetri Schroeder is a 31 y.o. female who was seen by synchronous (real-time) audio-video technology on 12/21/2020. Consent: Demetri Schroeder, who was seen by synchronous (real-time) audio-video technology, and/or her healthcare decision maker, is aware that this patient-initiated, Telehealth encounter on 12/21/2020 is a billable service, with coverage as determined by her insurance carrier. She is aware that she may receive a bill and has provided verbal consent to proceed: Yes. Assessment & Plan:   1. Threatened miscarriage in early pregnancy  Keep appt with ob tomorrow    2. Depression, unspecified depression type  3. Anxiety  Worse right now with grief of threatened ab, pt is going to reach out to counseling services, sent [employer] message    4. Type 2 diabetes mellitus not at goal [facility])  Improved control, will monitor          Pt was counseled on risks, benefits and alternatives of treatment options. All questions were asked and answered and the patient was agreeable with the treatment plan as outlined. Subjective:   Demetri Schroeder is a 31 y.o. female who was seen for Diabetes, Anxiety, and Depression      hcg wasn't doubling and last results show it was decreasing, was having some vaginal bleeding, has been doing blood q 2 days with OB (Dr Vida Werner)  On Saturday she got the results, she has an ultrasound scheduled for tomorrow for confirmation    DM management right now is 120 around fasting, if pregnancy continues she is plugged in with the gestational dm managmeent team at her OB office    Mood is grieving right now  Wants to connect with counesling if possible      Medications, allergies, PMH, PSH, SOCH, M[facility] reviewed and updated per routine protocol, see chart for review and changes if not noted here. ROS  A 12 point review of systems was negative except as noted here or in the HPI.     Objective:   Vital Signs: (As obtained by patient/caregiver at home)  Patient-Reported Vitals 12/21/2020 Patient-Reported Weight 327 lbs   Patient-Reported Height -   Patient-Reported Systolic  -   Patient-Reported LMP 10/28/2020        [INSTRUCTIONS:  \"[x]\" Indicates a positive item  \"[]\" Indicates a negative item  -- DELETE ALL ITEMS NOT EXAMINED]    Constitutional: [x] Appears well-developed and well-nourished [x] No apparent distress      [] Abnormal -     Mental status: [x] Alert and awake  [x] Oriented to person/place/time [x] Able to follow commands    [] Abnormal -     Eyes:   EOM    [x]  Normal    [] Abnormal -   Sclera  [x]  Normal    [] Abnormal -          Discharge [x]  None visible   [] Abnormal -     HENT: [x] Normocephalic, atraumatic  [] Abnormal -   [x] Mouth/Throat: Mucous membranes are moist    External Ears [x] Normal  [] Abnormal -    Neck: [x] No visualized mass [] Abnormal -     Pulmonary/Chest: [x] Respiratory effort normal   [x] No visualized signs of difficulty breathing or respiratory distress        [] Abnormal -      Musculoskeletal:   [] Normal gait with no signs of ataxia         [x] Normal range of motion of neck        [] Abnormal -     Neurological:        [x] No Facial Asymmetry (Cranial nerve 7 motor function) (limited exam due to video visit)          [x] No gaze palsy        [] Abnormal -          Skin:        [x] No significant exanthematous lesions or discoloration noted on facial skin         [] Abnormal -            Psychiatric:       [] Normal Affect [] Abnormal - tearful, grieving     [x] No Hallucinations    Other pertinent observable physical exam findings:as above    We discussed the expected course, resolution and complications of the diagnosis(es) in detail. Medication risks, benefits, costs, interactions, and alternatives were discussed as indicated. I advised her to contact the office if her condition worsens, changes or fails to improve as anticipated. She expressed understanding with the diagnosis(es) and plan.        Demetri Schroeder is a 31 y.o. female who was evaluated by a video visit encounter for concerns as above. Patient identification was verified prior to start of the visit. A caregiver was present when appropriate. Due to this being a TeleHealth encounter (During [facility]-83 public health emergency), evaluation of the following organ systems was limited: Vitals/Constitutional/EENT/Resp/CV/GI//MS/Neuro/Skin/Heme-Lymph-Imm. Pursuant to the emergency declaration under the [address] [place] [address], [facility] waiver authority and the [employer] Act, this Virtual  Visit was conducted, with patient's (and/or legal guardian's) consent, to reduce the patient's risk of exposure to COVID-19 and provide necessary medical care. Services were provided through a video synchronous discussion virtually to substitute for in-person clinic visit. Patient and provider were located at their individual homes. Vern Land MD  [facility]  12/21/20 1:30 PM     Portions of this note may have been populated using smart dictation software and may have \"sounds-like\" errors present.

## 2020-12-21 NOTE — PROGRESS NOTES
Chief Complaint   Patient presents with    Diabetes    Anxiety    Depression     1. Have you been to the ER, urgent care clinic since your last visit? Hospitalized since your last visit? No    2. Have you seen or consulted any other health care providers outside of the [address] [place] [address] since your last visit? Include any pap smears or colon screening. No    .

## 2020-12-28 ENCOUNTER — HOSPITAL ENCOUNTER (EMERGENCY)
Age: 30
Discharge: HOME | End: 2020-12-28
Attending: STUDENT IN AN ORGANIZED HEALTH CARE EDUCATION/TRAINING PROGRAM
Payer: COMMERCIAL

## 2020-12-28 ENCOUNTER — APPOINTMENT (OUTPATIENT)
Dept: ULTRASOUND IMAGING | Age: 30
End: 2020-12-28
Attending: PHYSICIAN ASSISTANT
Payer: COMMERCIAL

## 2020-12-28 VITALS
HEART RATE: 104 BPM | RESPIRATION RATE: 16 BRPM | DIASTOLIC BLOOD PRESSURE: 77 MMHG | SYSTOLIC BLOOD PRESSURE: 179 MMHG | HEIGHT: 65 IN | TEMPERATURE: 98.2 F | OXYGEN SATURATION: 99 % | WEIGHT: 293 LBS | BODY MASS INDEX: 48.82 KG/M2

## 2020-12-28 DIAGNOSIS — O26.899 PELVIC PAIN DURING PREGNANCY: ICD-10-CM

## 2020-12-28 DIAGNOSIS — O03.9 MISCARRIAGE: Primary | ICD-10-CM

## 2020-12-28 DIAGNOSIS — R10.2 PELVIC PAIN DURING PREGNANCY: ICD-10-CM

## 2020-12-28 LAB
BASOPHILS # BLD: 0 K/UL (ref 0–0.1)
BASOPHILS NFR BLD: 0 % (ref 0–1)
COMMENT, HOLDF: NORMAL
DIFFERENTIAL METHOD BLD: ABNORMAL
EOSINOPHIL # BLD: 0.3 K/UL (ref 0–0.4)
EOSINOPHIL NFR BLD: 2 % (ref 0–7)
ERYTHROCYTE [DISTWIDTH] IN BLOOD BY AUTOMATED COUNT: 14.6 % (ref 11.5–14.5)
HCG SERPL-ACNC: 239 MIU/ML (ref 0–6)
HCT VFR BLD AUTO: 34.7 % (ref 35–47)
HGB BLD-MCNC: 11.3 G/DL (ref 11.5–16)
IMM GRANULOCYTES # BLD AUTO: 0 K/UL (ref 0–0.04)
IMM GRANULOCYTES NFR BLD AUTO: 0 % (ref 0–0.5)
LYMPHOCYTES # BLD: 4.1 K/UL (ref 0.8–3.5)
LYMPHOCYTES NFR BLD: 31 % (ref 12–49)
MCH RBC QN AUTO: 27.8 PG (ref 26–34)
MCHC RBC AUTO-ENTMCNC: 32.6 G/DL (ref 30–36.5)
MCV RBC AUTO: 85.5 FL (ref 80–99)
MONOCYTES # BLD: 0.5 K/UL (ref 0–1)
MONOCYTES NFR BLD: 4 % (ref 5–13)
NEUTS SEG # BLD: 8.4 K/UL (ref 1.8–8)
NEUTS SEG NFR BLD: 63 % (ref 32–75)
NRBC # BLD: 0 K/UL (ref 0–0.01)
NRBC BLD-RTO: 0 PER 100 WBC
PLATELET # BLD AUTO: 411 K/UL (ref 150–400)
PMV BLD AUTO: 8.9 FL (ref 8.9–12.9)
RBC # BLD AUTO: 4.06 M/UL (ref 3.8–5.2)
SAMPLES BEING HELD,HOLD: NORMAL
WBC # BLD AUTO: 13.5 K/UL (ref 3.6–11)

## 2020-12-28 PROCEDURE — 76801 OB US < 14 WKS SINGLE FETUS: CPT

## 2020-12-28 PROCEDURE — 84702 CHORIONIC GONADOTROPIN TEST: CPT

## 2020-12-28 PROCEDURE — 99283 EMERGENCY DEPT VISIT LOW MDM: CPT

## 2020-12-28 PROCEDURE — 76817 TRANSVAGINAL US OBSTETRIC: CPT

## 2020-12-28 PROCEDURE — 85025 COMPLETE CBC W/AUTO DIFF WBC: CPT

## 2020-12-28 PROCEDURE — 36415 COLL VENOUS BLD VENIPUNCTURE: CPT

## 2020-12-28 PROCEDURE — 74011250637 HC RX REV CODE- 250/637: Performed by: PHYSICIAN ASSISTANT

## 2020-12-28 RX ORDER — TIZANIDINE 4 MG/1
8 TABLET ORAL
Status: COMPLETED | OUTPATIENT
Start: 2020-12-28 | End: 2020-12-28

## 2020-12-28 RX ADMIN — TIZANIDINE 8 MG: 2 TABLET ORAL at 22:53

## 2020-12-29 NOTE — ED NOTES
Patient adds her HCG levels drawn on the 18th and they had dropped to 283, then did blood work again when they confirmed the miscarriage on the 22nd and MD sent her a message saying that it increased to 324. Was supposed to do additional lab work tomorrow to confirm miscariage was complete. However the cramping returned and worsened over the day today so she took an additional pregnancy test and it was still \"brightly positive. \"

## 2020-12-29 NOTE — ED TRIAGE NOTES
Here 2 weeks ago with a threatened miscarriage. OBGYN confirmed that she was having a miscarriage. Started bleeding Tuesday of last week and ended Saturday. Now she is stating cramping has returned and is pretty severe in her mid perineum radiating to R side. Concerned for a possible ectopic.

## 2020-12-29 NOTE — DISCHARGE INSTRUCTIONS
Your quantitative hCG level this evening is 239. Your pelvic ultrasound shows now signs of an intrauterine pregnancy or an ectopic pregnancy and no fluid in your pelvic. Call your OB tomorrow to discuss further management. Return to the emergency room if worsening pain, fever, vomiting or other concerning symptoms develop.

## 2020-12-29 NOTE — ED PROVIDER NOTES
Patient is a 61-year-old female G1 who is followed by Dr. Aashish Murphy, presents for concern for miscarriage. She was seen at [facility] ED on 12/12 for vaginal bleeding and cramping, her hCG was 157 and her pelvic ultrasound showed no intrauterine pregnancy and no signs of ectopic. She subsequently followed up with her OB and her quant was later 293, on 12/18 she states it was 284. Her most recent ultrasound on 12/22 still showed no signs of an IUP or ectopic and her quantitative hCG level at that time had increased to 327. She was having bleeding for about 1 week but that resolved 1 week ago until 4 days ago when she noticed some spotting with wiping and passing a few clots. She currently has no complaints of bleeding. She does note increased pain in her pelvis since this morning. She is scheduled to see her OB for repeat labs tomorrow but wanted to be evaluated tonight. She also has a epidural lipoma in her spine and has chronic pain and feels like this is flaring up currently and is requesting a muscle relaxer, she is prescribed tizanidine, was switched to Flexeril by her OB. Per chart review her blood type is O+.            Past Medical History:   Diagnosis Date    Abnormal pap 1/2014+ 2/2015    LGSIL pap    Anemia NEC     Chronic pain     Depression     Depression     Diabetes (HCC)     HPV vaccine counseling     gardasil series complete     HSV-2 infection     PCOS (polycystic ovarian syndrome)     Prediabetes     Sleep disorder     sleep apnea    Vitamin D deficiency        Past Surgical History:   Procedure Laterality Date    HX COLPOSCOPY  2/2014 + 4/2015    YAHAIRA I; 2015 neg path         Family History:   Problem Relation Age of Onset    No Known Problems Mother     Heart Disease Father     Diabetes Father        Social History     Socioeconomic History    Marital status:      Spouse name: Not on file    Number of children: Not on file    Years of education: Not on file   Vanna Coleman Highest education level: Not on file   Occupational History    Not on file   Social Needs    Financial resource strain: Not on file    Food insecurity     Worry: Not on file     Inability: Not on file    Transportation needs     Medical: Not on file     Non-medical: Not on file   Tobacco Use    Smoking status: Never Smoker    Smokeless tobacco: Never Used   Substance and Sexual Activity    Alcohol use: Yes     Alcohol/week: 0.0 standard drinks     Comment: socially    Drug use: Not on file    Sexual activity: Yes     Partners: Male     Birth control/protection: Condom   Lifestyle    Physical activity     Days per week: Not on file     Minutes per session: Not on file    Stress: Not on file   Relationships    Social connections     Talks on phone: Not on file     Gets together: Not on file     Attends [employer] service: Not on file     Active member of club or organization: Not on file     Attends meetings of clubs or organizations: Not on file     Relationship status: Not on file    Intimate partner violence     Fear of current or ex partner: Not on file     Emotionally abused: Not on file     Physically abused: Not on file     Forced sexual activity: Not on file   Other Topics Concern    Not on file   Social History Narrative    ** Merged History Encounter **              ALLERGIES: Aspirin    Review of Systems   Constitutional: Negative. Negative for activity change, chills, fatigue and unexpected weight change. HENT: Negative for trouble swallowing. Respiratory: Negative for cough, chest tightness, shortness of breath and wheezing. Cardiovascular: Negative. Negative for chest pain and palpitations. Gastrointestinal: Negative. Negative for abdominal pain, diarrhea, nausea and vomiting. Genitourinary: Positive for pelvic pain and vaginal bleeding. Negative for dysuria, flank pain, frequency and hematuria. Musculoskeletal: Negative.   Negative for arthralgias, back pain, neck pain and neck stiffness. Skin: Negative. Negative for color change and rash. Neurological: Negative. Negative for dizziness, numbness and headaches. All other systems reviewed and are negative. Vitals:    12/28/20 2000   BP: (!) 179/77   Pulse: (!) 104   Resp: 16   Temp: 98.2 °F (36.8 °C)   SpO2: 99%   Weight: 145.2 kg (320 lb)   Height: 5' 5\" (1.651 m)            Physical Exam  Vitals signs and nursing note reviewed. Constitutional:       General: She is not in acute distress. Appearance: She is well-developed. She is not toxic-appearing or diaphoretic. Comments: AA female, elevated BMI   HENT:      Head: Normocephalic and atraumatic. Eyes:      General:         Right eye: No discharge. Left eye: No discharge. Conjunctiva/sclera: Conjunctivae normal.      Pupils: Pupils are equal, round, and reactive to light. Neck:      Musculoskeletal: Full passive range of motion without pain and normal range of motion. Trachea: No tracheal tenderness. Cardiovascular:      Rate and Rhythm: Normal rate and regular rhythm. Pulses: Normal pulses. Heart sounds: Normal heart sounds. No murmur. No friction rub. No gallop. Pulmonary:      Effort: Pulmonary effort is normal. No respiratory distress. Breath sounds: Normal breath sounds. No wheezing or rales. Chest:      Chest wall: No tenderness. Abdominal:      General: Bowel sounds are normal. There is no distension. Palpations: Abdomen is soft. Tenderness: There is no abdominal tenderness. There is no guarding or rebound. Comments: Suprapubic TTP   Genitourinary:     Comments: Normal external genitalia. Vagina- pink, moist without lesion. Closed os. No CMT. No adnexal TTP or masses. Musculoskeletal: Normal range of motion. General: No tenderness. Skin:     General: Skin is warm and dry. Capillary Refill: Capillary refill takes less than 2 seconds. Findings: No abrasion, erythema or rash. Neurological:      Mental Status: She is alert and oriented to person, place, and time. Cranial Nerves: No cranial nerve deficit. Sensory: No sensory deficit. Coordination: Coordination normal.   Psychiatric:         Speech: Speech normal.         Behavior: Behavior normal.          MDM  Number of Diagnoses or Management Options  Miscarriage  Pelvic pain during pregnancy  Diagnosis management comments:   Ddx: threatened miscarriage, ectopic, subchorionic hemorrhage       Amount and/or Complexity of Data Reviewed  Clinical lab tests: ordered and reviewed  Tests in the radiology section of CPT®: ordered and reviewed  Review and summarize past medical records: yes  Discuss the patient with other providers: yes    Patient Progress  Patient progress: stable         Procedures      I discussed patient's PMH, exam findings as well as careplan with the ER attending who agrees with care plan. Elliott Dean PA-C    Procedure Note - Pelvic Exam:    10:24 PM  Performed by: Elliott Dean PA-C  Chaperoned by: sumeet ABARCA  Pelvic exam was performed using bimanual and speculum. Further findings noted in physical exam.   The procedure took 1-15 minutes, and pt tolerated well. LABORATORY TESTS:  Recent Results (from the past 12 hour(s))   SAMPLES BEING HELD    Collection Time: 12/28/20  8:50 PM   Result Value Ref Range    SAMPLES BEING HELD RED,BLUE,DK GRN     COMMENT        Add-on orders for these samples will be processed based on acceptable specimen integrity and analyte stability, which may vary by analyte.    CBC WITH AUTOMATED DIFF    Collection Time: 12/28/20  8:50 PM   Result Value Ref Range    WBC 13.5 (H) 3.6 - 11.0 K/uL    RBC 4.06 3.80 - 5.20 M/uL    HGB 11.3 (L) 11.5 - 16.0 g/dL    HCT 34.7 (L) 35.0 - 47.0 %    MCV 85.5 80.0 - 99.0 FL    MCH 27.8 26.0 - 34.0 PG    MCHC 32.6 30.0 - 36.5 g/dL    RDW 14.6 (H) 11.5 - 14.5 %    PLATELET 401 (H) 178 - 400 K/uL    MPV 8.9 8.9 - 12.9 FL    NRBC 0.0 0 PER 100 WBC    ABSOLUTE NRBC 0.00 0.00 - 0.01 K/uL    NEUTROPHILS 63 32 - 75 %    LYMPHOCYTES 31 12 - 49 %    MONOCYTES 4 (L) 5 - 13 %    EOSINOPHILS 2 0 - 7 %    BASOPHILS 0 0 - 1 %    IMMATURE GRANULOCYTES 0 0.0 - 0.5 %    ABS. NEUTROPHILS 8.4 (H) 1.8 - 8.0 K/UL    ABS. LYMPHOCYTES 4.1 (H) 0.8 - 3.5 K/UL    ABS. MONOCYTES 0.5 0.0 - 1.0 K/UL    ABS. EOSINOPHILS 0.3 0.0 - 0.4 K/UL    ABS. BASOPHILS 0.0 0.0 - 0.1 K/UL    ABS. IMM. GRANS. 0.0 0.00 - 0.04 K/UL    DF AUTOMATED     BETA HCG, QT    Collection Time: 12/28/20  8:50 PM   Result Value Ref Range    Beta HCG,  (H) 0 - 6 MIU/ML       IMAGING RESULTS:  Us Uts Transvaginal Ob    Result Date: 12/28/2020  IMPRESSION: No evidence of intrauterine pregnancy or gestational sac. No evidence of ectopic pregnancy or free fluid. Us Preg Uts < 14 Wks Sngl    Result Date: 12/28/2020  IMPRESSION: No evidence of intrauterine pregnancy or gestational sac. No evidence of ectopic pregnancy or free fluid. MEDICATIONS GIVEN:  Medications   tiZANidine (ZANAFLEX) tablet 8 mg (has no administration in time range)         DISCHARGE NOTE:  10:44 PM  The patient's results have been reviewed with them and/or available family. Patient and/or family verbally conveyed their understanding and agreement of the patient's signs, symptoms, diagnosis, treatment and prognosis and additionally agree to follow up as recommended in the discharge instructions or to return to the Emergency Room should their condition change prior to their follow-up appointment. The patient/family verbally agrees with the care-plan and verbally conveys that all of their questions have been answered. The discharge instructions have also been provided to the patient and/or family with some educational information regarding the patient's diagnosis as well a list of reasons why the patient would want to return to the ER prior to their follow-up appointment, should their condition change. Plan:  1.  F/U with OB tomorrow  2. Declines offer for Norco, wants to continue Tylenol and muscle relaxer and f/u with OB  3.  Return precautions discussed and advised to return to ER if worse

## 2021-05-14 ENCOUNTER — OFFICE VISIT (OUTPATIENT)
Dept: FAMILY MEDICINE CLINIC | Age: 31
End: 2021-05-14
Payer: COMMERCIAL

## 2021-05-14 VITALS
OXYGEN SATURATION: 97 % | RESPIRATION RATE: 18 BRPM | BODY MASS INDEX: 48.82 KG/M2 | DIASTOLIC BLOOD PRESSURE: 72 MMHG | TEMPERATURE: 96.8 F | HEART RATE: 88 BPM | SYSTOLIC BLOOD PRESSURE: 119 MMHG | HEIGHT: 65 IN | WEIGHT: 293 LBS

## 2021-05-14 DIAGNOSIS — E11.9 TYPE 2 DIABETES MELLITUS NOT AT GOAL (HCC): Primary | ICD-10-CM

## 2021-05-14 DIAGNOSIS — Z71.85 VACCINE COUNSELING: ICD-10-CM

## 2021-05-14 DIAGNOSIS — E66.01 OBESITY, MORBID (HCC): ICD-10-CM

## 2021-05-14 LAB — HBA1C MFR BLD HPLC: 6 %

## 2021-05-14 PROCEDURE — 83036 HEMOGLOBIN GLYCOSYLATED A1C: CPT | Performed by: FAMILY MEDICINE

## 2021-05-14 PROCEDURE — 99214 OFFICE O/P EST MOD 30 MIN: CPT | Performed by: FAMILY MEDICINE

## 2021-05-14 RX ORDER — METFORMIN HYDROCHLORIDE 750 MG/1
750 TABLET, EXTENDED RELEASE ORAL
Qty: 180 TAB | Refills: 3 | Status: SHIPPED | OUTPATIENT
Start: 2021-05-14 | End: 2022-05-05 | Stop reason: SDUPTHER

## 2021-05-14 NOTE — PROGRESS NOTES
Chief Complaint   Patient presents with    Diabetes     fu dm sugars have been wnl needs refills on testing supplies

## 2021-05-14 NOTE — PROGRESS NOTES
Family Medicine Follow-Up Progress Note  Patient: Marguerite Dakins  1990, 36 y.o., female  Encounter Date: 5/14/2021    ASSESSMENT & PLAN    ICD-10-CM ICD-9-CM    1. Type 2 diabetes mellitus not at goal ([facility] [address]  E11.9 250.00 AMB POC HEMOGLOBIN A1C   2. Obesity, morbid ([facility] [address]  E66.01 278.01    3. Vaccine counseling  Z71.89 V65.49        Orders Placed This Encounter    AMB POC HEMOGLOBIN A1C    metFORMIN ER (GLUCOPHAGE XR) 750 mg tablet     Sig: Take 1 Tab by mouth Before breakfast and dinner. Dispense:  180 Tab     Refill:  3     Great work, at goal  Can defer self testing for now and continue simply with Metformin  Recheck in 4 to 6 months  Continue to work on weight loss  If still trying to conceive continue to take an every day prenatal vitamin  Counseling regarding the Covid vaccine done today, specifically the increased risk of maternal mortality if Covid is contracted during pregnancy, especially the risk of blood clotting in pregnancy which increases catastrophically and Covid. Recommend that the patient strongly consider the Covid vaccine if this is something that she wants to pursue she can find this locally in our community through a number of sources which we discussed  [facility]  Chief Complaint   Patient presents with    Diabetes     fu dm sugars have been wnl needs refills on testing supplies        David Tineo is a 36 y.o. female presenting today for routine follow-up, has been sticking to the plan and is overall feeling good, she is taking Metformin  twice daily AC. She is still thinking about trying to conceive and is taking her prenatal vitamin. She is not testing that consistently at home. Her A1c today is awesome at 6.   She is otherwise doing okay, she has not gotten the Covid vaccine but she is considering getting it    Wt Readings from Last 3 Encounters:   05/14/21 315 lb (142.9 kg)   12/28/20 320 lb (145.2 kg)   12/12/20 328 lb (148.8 kg)     Weight is down more than 10 pounds in the last 6 months    ROS  Review of Systems  A 12 point review of systems was negative except as noted here or in the HPI. OBJECTIVE  Visit Vitals  /72   Pulse 88   Temp 96.8 °F (36 °C) (Tympanic)   Resp 18   Ht 5' 5\" (1.651 m)   Wt 315 lb (142.9 kg)   LMP 05/03/2021   SpO2 97%   Breastfeeding Unknown   BMI 52.42 kg/m²       Physical Exam  Vitals signs and nursing note reviewed. Constitutional:       General: She is not in acute distress. Appearance: Normal appearance. She is well-developed. She is not diaphoretic. Comments: NAD, Nontoxic, Appears Stated Age, morbidly obese   HENT:      Head: Normocephalic and atraumatic. Right Ear: External ear normal.      Left Ear: External ear normal.   Eyes:      General: No scleral icterus. Right eye: No discharge. Left eye: No discharge. Extraocular Movements: Extraocular movements intact. Conjunctiva/sclera: Conjunctivae normal.      Pupils: Pupils are equal, round, and reactive to light. Neck:      Thyroid: No thyromegaly. Cardiovascular:      Rate and Rhythm: Normal rate and regular rhythm. Heart sounds: Normal heart sounds. No murmur. No friction rub. No gallop. Pulmonary:      Effort: Pulmonary effort is normal. No respiratory distress. Breath sounds: Normal breath sounds. No stridor. No wheezing, rhonchi or rales. Abdominal:      General: Bowel sounds are normal. There is no distension. Palpations: Abdomen is soft. Tenderness: There is no abdominal tenderness. There is no guarding or rebound. Comments: Large pannus   Musculoskeletal: Normal range of motion. General: No tenderness. Right lower leg: No edema. Left lower leg: No edema. Skin:     General: Skin is warm and dry. Capillary Refill: Capillary refill takes less than 2 seconds. Findings: No rash.       Comments: Acanthosis nigricans neck   Neurological:      General: No focal deficit present. Mental Status: She is alert and oriented to person, place, and time. Coordination: Coordination normal.      Gait: Gait normal.      Comments: Grossly intact CN   Psychiatric:         Mood and Affect: Mood normal.         Behavior: Behavior normal.         Thought Content: Thought content normal.         Judgment: Judgment normal.         Results for orders placed or performed in visit on 05/14/21   AMB POC HEMOGLOBIN A1C   Result Value Ref Range    Hemoglobin A1c (POC) 6.0 %       HISTORICAL  Reviewed and updated today, and as noted below:    Past Medical History:   Diagnosis Date    Abnormal pap 1/2014+ 2/2015    LGSIL pap    Anemia NEC     Chronic pain     Depression     Depression     Diabetes ([facility] [address]     HPV vaccine counseling     gardasil series complete     HSV-2 infection     PCOS (polycystic ovarian syndrome)     Prediabetes     Sleep disorder     sleep apnea    Vitamin D deficiency      Past Surgical History:   Procedure Laterality Date    HX COLPOSCOPY  2/2014 + 4/2015    YAHAIRA I; 2015 neg path     Family History   Problem Relation Age of Onset    No Known Problems Mother     Heart Disease Father     Diabetes Father      Social History     Tobacco Use   Smoking Status Never Smoker   Smokeless Tobacco Never Used     Social History     Socioeconomic History    Marital status:      Spouse name: Not on file    Number of children: Not on file    Years of education: Not on file    Highest education level: Not on file   Tobacco Use    Smoking status: Never Smoker    Smokeless tobacco: Never Used   Substance and Sexual Activity    Alcohol use:  Yes     Alcohol/week: 0.0 standard drinks     Comment: socially    Sexual activity: Yes     Partners: Male     Birth control/protection: Condom   Social History Narrative    ** Merged History Encounter **          Allergies   Allergen Reactions    Aspirin Unknown (comments)       LAB REVIEW  Lab Results   Component Value Date/Time    Sodium 137 12/12/2020 05:15 PM    Potassium 3.6 12/12/2020 05:15 PM    Chloride 104 12/12/2020 05:15 PM    CO2 30 12/12/2020 05:15 PM    Anion gap 3 (L) 12/12/2020 05:15 PM    Glucose 194 (H) 12/12/2020 05:15 PM    BUN 8 12/12/2020 05:15 PM    Creatinine 0.71 12/12/2020 05:15 PM    BUN/Creatinine ratio 11 (L) 12/12/2020 05:15 PM    GFR est AA >60 12/12/2020 05:15 PM    GFR est non-AA >60 12/12/2020 05:15 PM    Calcium 8.9 12/12/2020 05:15 PM    Bilirubin, total 0.2 12/12/2020 05:15 PM    Alk. phosphatase 97 12/12/2020 05:15 PM    Protein, total 7.4 12/12/2020 05:15 PM    Albumin 3.1 (L) 12/12/2020 05:15 PM    Globulin 4.3 (H) 12/12/2020 05:15 PM    A-G Ratio 0.7 (L) 12/12/2020 05:15 PM    ALT (SGPT) 22 12/12/2020 05:15 PM     Lab Results   Component Value Date/Time    WBC 13.5 (H) 12/28/2020 08:50 PM    HGB 11.3 (L) 12/28/2020 08:50 PM    HCT 34.7 (L) 12/28/2020 08:50 PM    PLATELET 844 (H) 72/54/8701 08:50 PM    MCV 85.5 12/28/2020 08:50 PM     Lab Results   Component Value Date/Time    Hemoglobin A1c 8.1 (H) 10/16/2020 11:14 AM    Hemoglobin A1c (POC) 6.0 05/14/2021 09:48 AM     Lab Results   Component Value Date/Time    Cholesterol, total 145 09/11/2019 11:51 AM    HDL Cholesterol 48 09/11/2019 11:51 AM    LDL, calculated 83 09/11/2019 11:51 AM    VLDL, calculated 14 09/11/2019 11:51 AM    Triglyceride 69 09/11/2019 11:51 AM           MD Kodi Barrow [facility]  05/14/21 3:44 PM    Portions of this note may have been populated using smart dictation software and may have \"sounds-like\" errors present. Pt was counseled on risks, benefits and alternatives of treatment options. All questions were asked and answered and the patient was agreeable with the treatment plan as outlined.

## 2021-05-25 DIAGNOSIS — F32.A DEPRESSION, UNSPECIFIED DEPRESSION TYPE: ICD-10-CM

## 2021-05-25 DIAGNOSIS — G89.29 CHRONIC BILATERAL LOW BACK PAIN WITH RIGHT-SIDED SCIATICA: ICD-10-CM

## 2021-05-25 DIAGNOSIS — M54.41 CHRONIC BILATERAL LOW BACK PAIN WITH RIGHT-SIDED SCIATICA: ICD-10-CM

## 2021-05-25 RX ORDER — DULOXETIN HYDROCHLORIDE 60 MG/1
CAPSULE, DELAYED RELEASE ORAL
Qty: 90 CAPSULE | Refills: 1 | Status: SHIPPED | OUTPATIENT
Start: 2021-05-25 | End: 2021-07-22

## 2021-07-21 ENCOUNTER — ANESTHESIA EVENT (OUTPATIENT)
Dept: SURGERY | Age: 31
End: 2021-07-21
Payer: COMMERCIAL

## 2021-07-22 ENCOUNTER — HOSPITAL ENCOUNTER (OUTPATIENT)
Age: 31
Setting detail: OUTPATIENT SURGERY
Discharge: HOME | End: 2021-07-22
Attending: OBSTETRICS & GYNECOLOGY | Admitting: OBSTETRICS & GYNECOLOGY
Payer: COMMERCIAL

## 2021-07-22 ENCOUNTER — ANESTHESIA (OUTPATIENT)
Dept: SURGERY | Age: 31
End: 2021-07-22
Payer: COMMERCIAL

## 2021-07-22 VITALS
DIASTOLIC BLOOD PRESSURE: 86 MMHG | OXYGEN SATURATION: 97 % | HEART RATE: 80 BPM | RESPIRATION RATE: 24 BRPM | SYSTOLIC BLOOD PRESSURE: 131 MMHG | TEMPERATURE: 98.1 F | WEIGHT: 293 LBS | BODY MASS INDEX: 48.82 KG/M2 | HEIGHT: 65 IN

## 2021-07-22 PROBLEM — O02.1 MISSED ABORTION: Status: ACTIVE | Noted: 2021-07-22

## 2021-07-22 LAB
ABO + RH BLD: NORMAL
BASOPHILS # BLD: 0 K/UL (ref 0–0.1)
BASOPHILS NFR BLD: 0 % (ref 0–1)
BLOOD GROUP ANTIBODIES SERPL: NORMAL
DIFFERENTIAL METHOD BLD: ABNORMAL
EOSINOPHIL # BLD: 0.1 K/UL (ref 0–0.4)
EOSINOPHIL NFR BLD: 2 % (ref 0–7)
ERYTHROCYTE [DISTWIDTH] IN BLOOD BY AUTOMATED COUNT: 15.4 % (ref 11.5–14.5)
GLUCOSE BLD STRIP.AUTO-MCNC: 89 MG/DL (ref 65–117)
GLUCOSE BLD STRIP.AUTO-MCNC: 90 MG/DL (ref 65–117)
HCT VFR BLD AUTO: 35.9 % (ref 35–47)
HGB BLD-MCNC: 11.7 G/DL (ref 11.5–16)
HGB BLD-MCNC: 12.5 G/DL (ref 11.5–16)
IMM GRANULOCYTES # BLD AUTO: 0 K/UL (ref 0–0.04)
IMM GRANULOCYTES NFR BLD AUTO: 0 % (ref 0–0.5)
LYMPHOCYTES # BLD: 3.1 K/UL (ref 0.8–3.5)
LYMPHOCYTES NFR BLD: 32 % (ref 12–49)
MCH RBC QN AUTO: 27.4 PG (ref 26–34)
MCHC RBC AUTO-ENTMCNC: 32.6 G/DL (ref 30–36.5)
MCV RBC AUTO: 84.1 FL (ref 80–99)
MONOCYTES # BLD: 0.5 K/UL (ref 0–1)
MONOCYTES NFR BLD: 5 % (ref 5–13)
NEUTS SEG # BLD: 5.8 K/UL (ref 1.8–8)
NEUTS SEG NFR BLD: 61 % (ref 32–75)
NRBC # BLD: 0 K/UL (ref 0–0.01)
NRBC BLD-RTO: 0 PER 100 WBC
PLATELET # BLD AUTO: 352 K/UL (ref 150–400)
PMV BLD AUTO: 9.5 FL (ref 8.9–12.9)
RBC # BLD AUTO: 4.27 M/UL (ref 3.8–5.2)
SERVICE CMNT-IMP: NORMAL
SERVICE CMNT-IMP: NORMAL
SPECIMEN EXP DATE BLD: NORMAL
WBC # BLD AUTO: 9.6 K/UL (ref 3.6–11)

## 2021-07-22 PROCEDURE — 74011250636 HC RX REV CODE- 250/636: Performed by: NURSE ANESTHETIST, CERTIFIED REGISTERED

## 2021-07-22 PROCEDURE — 76060000032 HC ANESTHESIA 0.5 TO 1 HR: Performed by: OBSTETRICS & GYNECOLOGY

## 2021-07-22 PROCEDURE — 36415 COLL VENOUS BLD VENIPUNCTURE: CPT

## 2021-07-22 PROCEDURE — 86901 BLOOD TYPING SEROLOGIC RH(D): CPT

## 2021-07-22 PROCEDURE — 76010000138 HC OR TIME 0.5 TO 1 HR: Performed by: OBSTETRICS & GYNECOLOGY

## 2021-07-22 PROCEDURE — 76210000020 HC REC RM PH II FIRST 0.5 HR: Performed by: OBSTETRICS & GYNECOLOGY

## 2021-07-22 PROCEDURE — 74011000250 HC RX REV CODE- 250: Performed by: NURSE ANESTHETIST, CERTIFIED REGISTERED

## 2021-07-22 PROCEDURE — 88305 TISSUE EXAM BY PATHOLOGIST: CPT

## 2021-07-22 PROCEDURE — 74011250636 HC RX REV CODE- 250/636: Performed by: OBSTETRICS & GYNECOLOGY

## 2021-07-22 PROCEDURE — 82962 GLUCOSE BLOOD TEST: CPT

## 2021-07-22 PROCEDURE — 74011250636 HC RX REV CODE- 250/636: Performed by: ANESTHESIOLOGY

## 2021-07-22 PROCEDURE — 77030008578 HC TBNG UTER SUC BUSS -A: Performed by: OBSTETRICS & GYNECOLOGY

## 2021-07-22 PROCEDURE — 85018 HEMOGLOBIN: CPT

## 2021-07-22 PROCEDURE — 77030030437 HC FLTR UTER VAC OCOA -A: Performed by: OBSTETRICS & GYNECOLOGY

## 2021-07-22 PROCEDURE — 77030003666 HC NDL SPINAL BD -A: Performed by: OBSTETRICS & GYNECOLOGY

## 2021-07-22 PROCEDURE — 76210000016 HC OR PH I REC 1 TO 1.5 HR: Performed by: OBSTETRICS & GYNECOLOGY

## 2021-07-22 PROCEDURE — 2709999900 HC NON-CHARGEABLE SUPPLY

## 2021-07-22 PROCEDURE — 85025 COMPLETE CBC W/AUTO DIFF WBC: CPT

## 2021-07-22 PROCEDURE — 77030011210: Performed by: OBSTETRICS & GYNECOLOGY

## 2021-07-22 PROCEDURE — 74011250637 HC RX REV CODE- 250/637: Performed by: OBSTETRICS & GYNECOLOGY

## 2021-07-22 PROCEDURE — 77030040361 HC SLV COMPR DVT MDII -B: Performed by: OBSTETRICS & GYNECOLOGY

## 2021-07-22 PROCEDURE — 2709999900 HC NON-CHARGEABLE SUPPLY: Performed by: OBSTETRICS & GYNECOLOGY

## 2021-07-22 PROCEDURE — 77030040922 HC BLNKT HYPOTHRM STRY -A

## 2021-07-22 RX ORDER — ONDANSETRON 2 MG/ML
INJECTION INTRAMUSCULAR; INTRAVENOUS AS NEEDED
Status: DISCONTINUED | OUTPATIENT
Start: 2021-07-22 | End: 2021-07-22 | Stop reason: HOSPADM

## 2021-07-22 RX ORDER — KETOROLAC TROMETHAMINE 30 MG/ML
30 INJECTION, SOLUTION INTRAMUSCULAR; INTRAVENOUS
Status: COMPLETED | OUTPATIENT
Start: 2021-07-22 | End: 2021-07-22

## 2021-07-22 RX ORDER — SODIUM CHLORIDE, SODIUM LACTATE, POTASSIUM CHLORIDE, CALCIUM CHLORIDE 600; 310; 30; 20 MG/100ML; MG/100ML; MG/100ML; MG/100ML
125 INJECTION, SOLUTION INTRAVENOUS CONTINUOUS
Status: DISCONTINUED | OUTPATIENT
Start: 2021-07-22 | End: 2021-07-22 | Stop reason: HOSPADM

## 2021-07-22 RX ORDER — SODIUM CHLORIDE 0.9 % (FLUSH) 0.9 %
5-40 SYRINGE (ML) INJECTION EVERY 8 HOURS
Status: DISCONTINUED | OUTPATIENT
Start: 2021-07-22 | End: 2021-07-22 | Stop reason: HOSPADM

## 2021-07-22 RX ORDER — SODIUM CHLORIDE 0.9 % (FLUSH) 0.9 %
5-40 SYRINGE (ML) INJECTION AS NEEDED
Status: DISCONTINUED | OUTPATIENT
Start: 2021-07-22 | End: 2021-07-22 | Stop reason: HOSPADM

## 2021-07-22 RX ORDER — HYDROCODONE BITARTRATE AND ACETAMINOPHEN 5; 325 MG/1; MG/1
1 TABLET ORAL AS NEEDED
Status: DISCONTINUED | OUTPATIENT
Start: 2021-07-22 | End: 2021-07-22 | Stop reason: HOSPADM

## 2021-07-22 RX ORDER — MIDAZOLAM HYDROCHLORIDE 1 MG/ML
1 INJECTION, SOLUTION INTRAMUSCULAR; INTRAVENOUS AS NEEDED
Status: DISCONTINUED | OUTPATIENT
Start: 2021-07-22 | End: 2021-07-22 | Stop reason: HOSPADM

## 2021-07-22 RX ORDER — HYDROMORPHONE HYDROCHLORIDE 1 MG/ML
0.2 INJECTION, SOLUTION INTRAMUSCULAR; INTRAVENOUS; SUBCUTANEOUS
Status: DISCONTINUED | OUTPATIENT
Start: 2021-07-22 | End: 2021-07-22 | Stop reason: HOSPADM

## 2021-07-22 RX ORDER — ONDANSETRON 2 MG/ML
4 INJECTION INTRAMUSCULAR; INTRAVENOUS AS NEEDED
Status: DISCONTINUED | OUTPATIENT
Start: 2021-07-22 | End: 2021-07-22 | Stop reason: HOSPADM

## 2021-07-22 RX ORDER — MISOPROSTOL 200 UG/1
TABLET ORAL AS NEEDED
Status: DISCONTINUED | OUTPATIENT
Start: 2021-07-22 | End: 2021-07-22 | Stop reason: HOSPADM

## 2021-07-22 RX ORDER — SODIUM CHLORIDE, SODIUM LACTATE, POTASSIUM CHLORIDE, CALCIUM CHLORIDE 600; 310; 30; 20 MG/100ML; MG/100ML; MG/100ML; MG/100ML
75 INJECTION, SOLUTION INTRAVENOUS CONTINUOUS
Status: DISCONTINUED | OUTPATIENT
Start: 2021-07-22 | End: 2021-07-22 | Stop reason: HOSPADM

## 2021-07-22 RX ORDER — MIDAZOLAM HYDROCHLORIDE 1 MG/ML
0.5 INJECTION, SOLUTION INTRAMUSCULAR; INTRAVENOUS
Status: DISCONTINUED | OUTPATIENT
Start: 2021-07-22 | End: 2021-07-22 | Stop reason: HOSPADM

## 2021-07-22 RX ORDER — SODIUM CHLORIDE, SODIUM LACTATE, POTASSIUM CHLORIDE, CALCIUM CHLORIDE 600; 310; 30; 20 MG/100ML; MG/100ML; MG/100ML; MG/100ML
25 INJECTION, SOLUTION INTRAVENOUS CONTINUOUS
Status: DISCONTINUED | OUTPATIENT
Start: 2021-07-22 | End: 2021-07-22 | Stop reason: HOSPADM

## 2021-07-22 RX ORDER — MIDAZOLAM HYDROCHLORIDE 1 MG/ML
INJECTION, SOLUTION INTRAMUSCULAR; INTRAVENOUS AS NEEDED
Status: DISCONTINUED | OUTPATIENT
Start: 2021-07-22 | End: 2021-07-22 | Stop reason: HOSPADM

## 2021-07-22 RX ORDER — FENTANYL CITRATE 50 UG/ML
25 INJECTION, SOLUTION INTRAMUSCULAR; INTRAVENOUS
Status: DISCONTINUED | OUTPATIENT
Start: 2021-07-22 | End: 2021-07-22 | Stop reason: HOSPADM

## 2021-07-22 RX ORDER — KETOROLAC TROMETHAMINE 30 MG/ML
INJECTION, SOLUTION INTRAMUSCULAR; INTRAVENOUS
Status: DISCONTINUED
Start: 2021-07-22 | End: 2021-07-22 | Stop reason: HOSPADM

## 2021-07-22 RX ORDER — FENTANYL CITRATE 50 UG/ML
INJECTION, SOLUTION INTRAMUSCULAR; INTRAVENOUS AS NEEDED
Status: DISCONTINUED | OUTPATIENT
Start: 2021-07-22 | End: 2021-07-22 | Stop reason: HOSPADM

## 2021-07-22 RX ORDER — IBUPROFEN 800 MG/1
800 TABLET ORAL
Qty: 12 TABLET | Refills: 0 | Status: SHIPPED | OUTPATIENT
Start: 2021-07-22

## 2021-07-22 RX ORDER — DEXAMETHASONE SODIUM PHOSPHATE 4 MG/ML
INJECTION, SOLUTION INTRA-ARTICULAR; INTRALESIONAL; INTRAMUSCULAR; INTRAVENOUS; SOFT TISSUE AS NEEDED
Status: DISCONTINUED | OUTPATIENT
Start: 2021-07-22 | End: 2021-07-22 | Stop reason: HOSPADM

## 2021-07-22 RX ORDER — SODIUM CHLORIDE, SODIUM LACTATE, POTASSIUM CHLORIDE, CALCIUM CHLORIDE 600; 310; 30; 20 MG/100ML; MG/100ML; MG/100ML; MG/100ML
INJECTION, SOLUTION INTRAVENOUS
Status: DISCONTINUED | OUTPATIENT
Start: 2021-07-22 | End: 2021-07-22 | Stop reason: HOSPADM

## 2021-07-22 RX ORDER — PROPOFOL 10 MG/ML
INJECTION, EMULSION INTRAVENOUS AS NEEDED
Status: DISCONTINUED | OUTPATIENT
Start: 2021-07-22 | End: 2021-07-22 | Stop reason: HOSPADM

## 2021-07-22 RX ORDER — DEXMEDETOMIDINE HYDROCHLORIDE 100 UG/ML
INJECTION, SOLUTION INTRAVENOUS AS NEEDED
Status: DISCONTINUED | OUTPATIENT
Start: 2021-07-22 | End: 2021-07-22 | Stop reason: HOSPADM

## 2021-07-22 RX ORDER — DIPHENHYDRAMINE HYDROCHLORIDE 50 MG/ML
12.5 INJECTION, SOLUTION INTRAMUSCULAR; INTRAVENOUS AS NEEDED
Status: DISCONTINUED | OUTPATIENT
Start: 2021-07-22 | End: 2021-07-22 | Stop reason: HOSPADM

## 2021-07-22 RX ORDER — DOXYCYCLINE HYCLATE 100 MG
200 TABLET ORAL ONCE
Status: COMPLETED | OUTPATIENT
Start: 2021-07-22 | End: 2021-07-22

## 2021-07-22 RX ORDER — SODIUM CHLORIDE 9 MG/ML
50 INJECTION, SOLUTION INTRAVENOUS CONTINUOUS
Status: DISCONTINUED | OUTPATIENT
Start: 2021-07-22 | End: 2021-07-22 | Stop reason: HOSPADM

## 2021-07-22 RX ORDER — FENTANYL CITRATE 50 UG/ML
50 INJECTION, SOLUTION INTRAMUSCULAR; INTRAVENOUS AS NEEDED
Status: DISCONTINUED | OUTPATIENT
Start: 2021-07-22 | End: 2021-07-22 | Stop reason: HOSPADM

## 2021-07-22 RX ORDER — LIDOCAINE HYDROCHLORIDE 10 MG/ML
0.1 INJECTION, SOLUTION EPIDURAL; INFILTRATION; INTRACAUDAL; PERINEURAL AS NEEDED
Status: DISCONTINUED | OUTPATIENT
Start: 2021-07-22 | End: 2021-07-22 | Stop reason: HOSPADM

## 2021-07-22 RX ORDER — PROPOFOL 10 MG/ML
INJECTION, EMULSION INTRAVENOUS
Status: DISCONTINUED | OUTPATIENT
Start: 2021-07-22 | End: 2021-07-22 | Stop reason: HOSPADM

## 2021-07-22 RX ORDER — MORPHINE SULFATE 2 MG/ML
2 INJECTION, SOLUTION INTRAMUSCULAR; INTRAVENOUS
Status: DISCONTINUED | OUTPATIENT
Start: 2021-07-22 | End: 2021-07-22 | Stop reason: HOSPADM

## 2021-07-22 RX ORDER — SODIUM CHLORIDE 9 MG/ML
25 INJECTION, SOLUTION INTRAVENOUS CONTINUOUS
Status: DISCONTINUED | OUTPATIENT
Start: 2021-07-22 | End: 2021-07-22 | Stop reason: HOSPADM

## 2021-07-22 RX ADMIN — MIDAZOLAM 2 MG: 1 INJECTION INTRAMUSCULAR; INTRAVENOUS at 15:20

## 2021-07-22 RX ADMIN — PROPOFOL 50 MG: 10 INJECTION, EMULSION INTRAVENOUS at 15:31

## 2021-07-22 RX ADMIN — PROPOFOL 50 MG: 10 INJECTION, EMULSION INTRAVENOUS at 15:30

## 2021-07-22 RX ADMIN — PROPOFOL 50 MG: 10 INJECTION, EMULSION INTRAVENOUS at 15:37

## 2021-07-22 RX ADMIN — FENTANYL CITRATE 25 MCG: 50 INJECTION, SOLUTION INTRAMUSCULAR; INTRAVENOUS at 16:00

## 2021-07-22 RX ADMIN — DOXYCYCLINE HYCLATE 200 MG: 100 TABLET, COATED ORAL at 13:53

## 2021-07-22 RX ADMIN — PROPOFOL 50 MG: 10 INJECTION, EMULSION INTRAVENOUS at 15:29

## 2021-07-22 RX ADMIN — FENTANYL CITRATE 25 MCG: 50 INJECTION, SOLUTION INTRAMUSCULAR; INTRAVENOUS at 15:32

## 2021-07-22 RX ADMIN — DEXAMETHASONE SODIUM PHOSPHATE 4 MG: 4 INJECTION, SOLUTION INTRAMUSCULAR; INTRAVENOUS at 15:42

## 2021-07-22 RX ADMIN — DEXMEDETOMIDINE HYDROCHLORIDE 12 MCG: 100 INJECTION, SOLUTION, CONCENTRATE INTRAVENOUS at 16:17

## 2021-07-22 RX ADMIN — FENTANYL CITRATE 25 MCG: 50 INJECTION, SOLUTION INTRAMUSCULAR; INTRAVENOUS at 16:53

## 2021-07-22 RX ADMIN — DEXMEDETOMIDINE HYDROCHLORIDE 4 MCG: 100 INJECTION, SOLUTION, CONCENTRATE INTRAVENOUS at 15:24

## 2021-07-22 RX ADMIN — SODIUM CHLORIDE, POTASSIUM CHLORIDE, SODIUM LACTATE AND CALCIUM CHLORIDE 25 ML/HR: 600; 310; 30; 20 INJECTION, SOLUTION INTRAVENOUS at 13:40

## 2021-07-22 RX ADMIN — PROPOFOL 125 MCG/KG/MIN: 10 INJECTION, EMULSION INTRAVENOUS at 15:29

## 2021-07-22 RX ADMIN — FENTANYL CITRATE 25 MCG: 50 INJECTION, SOLUTION INTRAMUSCULAR; INTRAVENOUS at 15:53

## 2021-07-22 RX ADMIN — SODIUM CHLORIDE, POTASSIUM CHLORIDE, SODIUM LACTATE AND CALCIUM CHLORIDE: 600; 310; 30; 20 INJECTION, SOLUTION INTRAVENOUS at 15:20

## 2021-07-22 RX ADMIN — FENTANYL CITRATE 25 MCG: 50 INJECTION, SOLUTION INTRAMUSCULAR; INTRAVENOUS at 15:55

## 2021-07-22 RX ADMIN — KETOROLAC TROMETHAMINE 30 MG: 30 INJECTION, SOLUTION INTRAMUSCULAR at 16:34

## 2021-07-22 RX ADMIN — DEXMEDETOMIDINE HYDROCHLORIDE 4 MCG: 100 INJECTION, SOLUTION, CONCENTRATE INTRAVENOUS at 15:20

## 2021-07-22 RX ADMIN — ONDANSETRON HYDROCHLORIDE 4 MG: 2 INJECTION, SOLUTION INTRAMUSCULAR; INTRAVENOUS at 15:20

## 2021-07-22 RX ADMIN — PROPOFOL 50 MG: 10 INJECTION, EMULSION INTRAVENOUS at 15:33

## 2021-07-22 NOTE — OP NOTES
SUCTION CURETTAGE FULL OP NOTE    Kayla Quintero  [id number]  1990      DATE OF PROCEDURE:  7/22/2021    PREOPERATIVE DIAGNOSIS:  MISSED AB    POSTOPERATIVE DIAGNOSIS:  MISSED AB    PROCEDURE: Procedure(s):  SUCTION DILATATION AND CURETTAGE WITH GENETIC TESTING   (URGENT)     SURGEON:  Carrie Peterson MD    ASSISTANT: None    ANESTHESIA:monitored anesthesia care    EBL: less than 50 ml    SPECIMENS: Products of conception    FINDINGS: 8 wk sized uterus, products of conception    DESCRIPTION OF PROCEDURE: The patient was placed on the operating room table in the supine position and placed under general endotracheal anesthesia. Time out was done to confirm the operating procedure, surgeon, patient and site. Once confirmed by the team, procedure was started. PROCEDURE: Patient was placed on the operating table in the supine and after induction of anesthesia she was placed in the dorsal lithotomy position and prepped and draped in the usual fashion for vaginal surgery. Cervix was exposed with a weighted vaginal speculum and grasped with a single-tooth tenaculum. A curved 9 suction curette device was then introduced into the endometrial cavity after it was sounded to approximately 9 cm. Thorough suction curettage followed by sharp curettage with a large curette followed again by suction curettage was performed until the suction returned no further clot or products of conception. Adequate curettage was felt to be obtained. The uterus was massaged. Hemostasis appeared normal, Instruments were removed. The patient went to the recovery room in satisfactory condition. All counts were correct times two. 800mcg of misoprostol were placed per rectum for improved uterine tone. Of note blood type was RH positive. Tissue samples were sent to genetics as requested by the patient.

## 2021-07-22 NOTE — PERIOP NOTES
Dr Taryn Mcknight at PACU bedside. MD with verbal order for Ketorolac 30mg IV once. Pt states she has taken Ibuprofen at home with no negative side effects despite Aspirin allergy.

## 2021-07-22 NOTE — ANESTHESIA POSTPROCEDURE EVALUATION
Post-Anesthesia Evaluation and Assessment    Patient: Destini Scherer MRN: [id number]  SSN: [id number]    YOB: 1990  Age: 36 y.o. Sex: female      I have evaluated the patient and they are stable and ready for discharge from the PACU. Cardiovascular Function/Vital Signs  Visit Vitals  BP (!) 134/91   Pulse (!) 101   Temp 36.9 °C (98.4 °F)   Resp 24   Ht 5' 5\" (1.651 m)   Wt 140.6 kg (310 lb)   SpO2 97%   Breastfeeding No   BMI 51.59 kg/m²       Patient is status post MAC anesthesia for Procedure(s):  SUCTION DILATATION AND CURETTAGE WITH GENETIC TESTING   (URGENT). Nausea/Vomiting: None    Postoperative hydration reviewed and adequate. Pain:  Pain Scale 1: Numeric (0 - 10) (07/22/21 1251)  Pain Intensity 1: 0 (07/22/21 1251)   Managed    Neurological Status:   Neuro (WDL):  (LT leg tingling sensations) (07/22/21 1306)   At baseline    Mental Status, Level of Consciousness: Alert and  oriented to person, place, and time    Pulmonary Status:   O2 Device: CO2 nasal cannula (07/22/21 1616)   Adequate oxygenation and airway patent    Complications related to anesthesia: None    Post-anesthesia assessment completed. No concerns    Signed By: Nikolai Romano MD     July 22, 2021              Procedure(s):  SUCTION DILATATION AND CURETTAGE WITH GENETIC TESTING   (URGENT). MAC    <BSHSIANPOST>    INITIAL Post-op Vital signs:   Vitals Value Taken Time   /84 07/22/21 1635   Temp     Pulse 84 07/22/21 1636   Resp 22 07/22/21 1636   SpO2 100 % 07/22/21 1636   Vitals shown include unvalidated device data.

## 2021-07-22 NOTE — H&P
Gynecology History and Physical    Name: Luis Stallworth MRN: [id number] SSN: [id number]    YOB: 1990  Age: 36 y.o. Sex: female       Subjective:      Chief complaint:  Missed     June Melida is a 36 y.o.  with a missed  at approximately 8 wks gestation. She had two consecutive ultrasounds without FHTs and with no growth seen spaced two weeks apart. She did expectant management for 2 wks and still has had no bleeding or cramping. She desires definitive surgical management with D&C. She would like genetic testing. She is admitted for Procedure(s) (LRB):  SUCTION DILATATION AND CURETTAGE WITH GENETIC TESTING  (CHOICE ANESTH) (URGENT) (N/A)        Past Medical History:   Diagnosis Date    Abnormal pap 2014+ 2015    LGSIL pap    Anemia NEC     Chronic pain     Depression     Diabetes ([address]     HPV vaccine counseling     gardasil series complete     HSV-2 infection     Morbid [facility] [address]     PCOS (polycystic ovarian syndrome)     Prediabetes     Sleep disorder     sleep apnea    Vitamin D deficiency      Past Surgical History:   Procedure Laterality Date    HX COLPOSCOPY  2014 + 2015    YAHAIRA I; 2015 neg path     OB History        1    Para        Term                AB        Living           SAB        TAB        Ectopic        Molar        Multiple        Live Births                  Allergies   Allergen Reactions    Aspirin Unknown (comments)     Bleeding disorder     Prior to Admission medications    Medication Sig Start Date End Date Taking? Authorizing Provider   metFORMIN ER (GLUCOPHAGE XR) 750 mg tablet Take 1 Tab by mouth Before breakfast and dinner. 21  Yes Brayden Ron MD   prenatal vit/iron fum/folic ac (PRENATAL 1+1 PO) Prenatal   Yes Provider, Historical   cyclobenzaprine (FLEXERIL) 10 mg tablet Take 1 Tab by mouth three (3) times daily as needed for Muscle Spasm(s).  19  Yes Thera Constant, DO   DULoxetine (CYMBALTA) 60 mg capsule TAKE 1 CAPSULE BY MOUTH EVERY DAY  Patient not taking: Reported on 7/22/2021 5/25/21   Mojgan Mayes MD   doxylamine succinate (UNISOM) 25 mg tablet Take 1 Tab by mouth nightly as needed for Insomnia or Nausea. Patient not taking: Reported on 7/22/2021 12/7/20   Mojgan Mayes MD   traZODone (DESYREL) 100 mg tablet TAKE 2 TABLETS BY MOUTH AT BEDTIME  Patient not taking: Reported on 7/22/2021 9/9/20   Mojgan Mayes MD   ergocalciferol (ERGOCALCIFEROL) 1,250 mcg (50,000 unit) capsule Take 1 Cap by mouth every seven (7) days.   Patient not taking: Reported on 7/22/2021 7/6/20   Mojgan Mayes MD   tiZANidine (ZANAFLEX) 4 mg tablet TAKE 1 TABLET BY MOUTH TWICE A DAY AS NEEDED  Patient not taking: Reported on 7/22/2021 2/21/20   Provider, Historical   Blood-Glucose Meter (ACCU-CHEK GUIDE GLUCOSE METER) misc Check blood sugar 1-3 times daily or as directed by JESUS Price 11.9 10/4/19   Mojgan Mayes MD   glucose blood VI test strips (ACCU-CHEK GUIDE) strip Check blood sugar 1-3 times daily or as directed by JESUS Price 11.9 10/4/19   Mojgan Mayes MD   Blood Glucose Control High&Low (ACCU-CHEK GUIDE L1-L2 CTRL SOL) soln Check blood sugar 1-3 times daily or as directed by JESUS Price 11.9 10/4/19   Mojgan Mayes MD   lancets (ACCU-CHEK FASTCLIX LANCET DRUM) misc Check blood sugar 1-3 times daily or as directed by JESUS Price 11.9 10/4/19   Mojgan Mayes MD   valACYclovir (VALTREX) 500 mg tablet Take two po for 3 days  Patient not taking: Reported on 7/22/2021 11/3/16   Angi Corado MD      Family History   Problem Relation Age of Onset    No Known Problems Mother     Heart Disease Father     Diabetes Father      Social History     Socioeconomic History    Marital status:      Spouse name: Not on file    Number of children: Not on file    Years of education: Not on file    Highest education level: Not on file   Occupational History    Not on file   Tobacco Use    Smoking status: Never Smoker    Smokeless tobacco: Never Used   Vaping Use    Vaping Use: Never used   Substance and Sexual Activity    Alcohol use: Yes     Alcohol/week: 0.0 standard drinks     Comment: socially    Drug use: Not on file    Sexual activity: Yes     Partners: Male     Birth control/protection: Condom   Other Topics Concern    Not on file   Social History Narrative    ** Merged History Encounter **          Social Determinants of Health     Financial Resource Strain:     Difficulty of Paying Living Expenses:    Food Insecurity:     Worried About Running Out of Food in the Last Year:     Ran Out of Food in the Last Year:    Transportation Needs:     Lack of Transportation (Medical):  Lack of Transportation (Non-Medical):    Physical Activity:     Days of Exercise per Week:     Minutes of Exercise per Session:    Stress:     Feeling of Stress :    Social Connections:     Frequency of Communication with Friends and Family:     Frequency of Social Gatherings with Friends and Family:     Attends Shinto Services:     Active Member of Clubs or Organizations:     Attends Club or Organization Meetings:     Marital Status:    Intimate Partner Violence:     Fear of Current or Ex-Partner:     Emotionally Abused:     Physically Abused:     Sexually Abused:        Review of Systems   As noted in HPI    Objective:     Vitals:    21 1251   BP: 137/87   Pulse: 84   Resp: 16   Temp: 98.4 °F (36.9 °C)   SpO2: 100%   Weight: 140.6 kg (310 lb)   Height: 5' 5\" (1.651 m)       Physical Exam   Gen: NAD  Resp: CTAB  CV: RRR  Abd: soft, NT, ND, obese  Ext: no edema    Assessment:      with 8 wk missed  that has failed expectant management, she desires definitive management with D&C    Plan:     1.  Procedure(s) (LRB):  SUCTION DILATATION AND CURETTAGE WITH GENETIC TESTING  (CHOICE ANESTH) (URGENT) (N/A)  Discussed the risks of surgery including the risks of bleeding, infection, deep vein thrombosis, and surgical injuries to internal organs including but not limited to the bowels, bladder, rectum, and female reproductive organs. The patient understands the risks; any and all questions were answered to the patient's satisfaction.   Doxycycline prior to OR  SCDs prior to OR  Consents updated and questions answered  Desires genetic testing  Proceed to OR as planned    Jaycob Kilgore MD

## 2021-07-22 NOTE — DISCHARGE INSTRUCTIONS
After Care Instructions For Your D&C      1. You may resume your usual diet once the nausea resolves. Initially, try sips of warm fluids and a bland diet. 2. Avoid heavy lifting and straining. Gradually increase your activity. First, try walking and doing light activity around the house. Resume your normal habits if no significant discomfort or bleeding develops. Most women can return to work within one to four days after this procedure. 3. You may take showers. Avoid using a tub bath, swimming pool or hot tub until after your check-up. 4. Do not place anything in your vagina until after your postoperative visit. Do not   douche, use tampons, or have intercourse because this may cause bleeding and   infection. 5. You may initially experience a heavy bloody discharge. This should not be more than your menstrual flow. Over the next several days, the flow should steadily decrease. 6. Typically following the procedure, there is little or no pain. You may feel cramps in your lower abdomen. Tylenol may relieve mild cramping. If pain medication does not improve your symptoms, you should contact your physician. 7. Contact the office if you have excessive bleeding (saturating a pad an hour for two hours or passing large clots). It is also necessary to speak with your physician if you develop chills, a temperature greater than 100.4, difficulty voiding or burning on urination. 8. Your physician may want to see you in the office after your D&C. Please call for an appointment if this has not already been arranged. Our office phone number is [phone].  If appropriate, the microscopic results from your procedure will be discussed at this follow-up visit.          ______________________________________________________________________    Anesthesia Discharge Instructions    After general anesthesia or intervenous sedation, for 24 hours or while taking prescription Narcotics:  · Limit your activities  · Do not drive or operate hazardous machinery  · If you have not urinated within 8 hours after discharge, please contact your surgeon on call. · Do not make important personal or business decisions  · Do not drink alcoholic beverages    Report the following to your surgeon:  · Excessive pain, swelling, redness or odor of or around the surgical area  · Temperature over 100.5 degrees  · Nausea and vomiting lasting longer than 4 hours or if unable to take medication  · Any signs of decreased circulation or nerve impairment to extremity:  Change in color, persistent numbness, tingling, coldness or increased pain.   · Any questions

## 2021-07-22 NOTE — ANESTHESIA PREPROCEDURE EVALUATION
Relevant Problems   NEUROLOGY   (+) Depression      ENDOCRINE   (+) Obesity, morbid (HCC)   (+) Type 2 diabetes mellitus ([facility] [address]       Anesthetic History               Review of Systems / Medical History      Pulmonary                   Neuro/Psych              Cardiovascular                       GI/Hepatic/Renal                Endo/Other    Diabetes    Morbid obesity     Other Findings              Physical Exam    Airway  Mallampati: III           Cardiovascular               Dental         Pulmonary                 Abdominal         Other Findings            Anesthetic Plan    ASA: 3            Induction: Intravenous  Anesthetic plan and risks discussed with: Patient

## 2021-07-26 ENCOUNTER — APPOINTMENT (OUTPATIENT)
Dept: ULTRASOUND IMAGING | Age: 31
End: 2021-07-26
Attending: EMERGENCY MEDICINE
Payer: COMMERCIAL

## 2021-07-26 ENCOUNTER — HOSPITAL ENCOUNTER (EMERGENCY)
Age: 31
Discharge: HOME | End: 2021-07-26
Attending: EMERGENCY MEDICINE | Admitting: EMERGENCY MEDICINE
Payer: COMMERCIAL

## 2021-07-26 ENCOUNTER — APPOINTMENT (OUTPATIENT)
Dept: CT IMAGING | Age: 31
End: 2021-07-26
Attending: NURSE PRACTITIONER
Payer: COMMERCIAL

## 2021-07-26 VITALS
TEMPERATURE: 97.6 F | BODY MASS INDEX: 48.82 KG/M2 | HEIGHT: 65 IN | HEART RATE: 84 BPM | WEIGHT: 293 LBS | SYSTOLIC BLOOD PRESSURE: 126 MMHG | RESPIRATION RATE: 18 BRPM | DIASTOLIC BLOOD PRESSURE: 78 MMHG | OXYGEN SATURATION: 100 %

## 2021-07-26 DIAGNOSIS — R10.2 PELVIC PAIN: Primary | ICD-10-CM

## 2021-07-26 LAB
ALBUMIN SERPL-MCNC: 3 G/DL (ref 3.5–5)
ALBUMIN/GLOB SERPL: 0.8 {RATIO} (ref 1.1–2.2)
ALP SERPL-CCNC: 91 U/L (ref 45–117)
ALT SERPL-CCNC: 14 U/L (ref 12–78)
ANION GAP SERPL CALC-SCNC: 6 MMOL/L (ref 5–15)
APPEARANCE UR: CLEAR
AST SERPL-CCNC: 6 U/L (ref 15–37)
BACTERIA URNS QL MICRO: NEGATIVE /HPF
BASOPHILS # BLD: 0 K/UL (ref 0–0.1)
BASOPHILS NFR BLD: 0 % (ref 0–1)
BILIRUB SERPL-MCNC: 0.2 MG/DL (ref 0.2–1)
BILIRUB UR QL: NEGATIVE
BUN SERPL-MCNC: 10 MG/DL (ref 6–20)
BUN/CREAT SERPL: 18 (ref 12–20)
CALCIUM SERPL-MCNC: 8.4 MG/DL (ref 8.5–10.1)
CHLORIDE SERPL-SCNC: 108 MMOL/L (ref 97–108)
CLUE CELLS VAG QL WET PREP: NORMAL
CO2 SERPL-SCNC: 26 MMOL/L (ref 21–32)
COLOR UR: ABNORMAL
COMMENT, HOLDF: NORMAL
CREAT SERPL-MCNC: 0.57 MG/DL (ref 0.55–1.02)
DIFFERENTIAL METHOD BLD: ABNORMAL
EOSINOPHIL # BLD: 0.3 K/UL (ref 0–0.4)
EOSINOPHIL NFR BLD: 3 % (ref 0–7)
EPITH CASTS URNS QL MICRO: ABNORMAL /LPF
ERYTHROCYTE [DISTWIDTH] IN BLOOD BY AUTOMATED COUNT: 14.8 % (ref 11.5–14.5)
GLOBULIN SER CALC-MCNC: 4 G/DL (ref 2–4)
GLUCOSE SERPL-MCNC: 119 MG/DL (ref 65–100)
GLUCOSE UR STRIP.AUTO-MCNC: NEGATIVE MG/DL
HCT VFR BLD AUTO: 34.2 % (ref 35–47)
HGB BLD-MCNC: 10.9 G/DL (ref 11.5–16)
HGB UR QL STRIP: ABNORMAL
IMM GRANULOCYTES # BLD AUTO: 0.1 K/UL (ref 0–0.04)
IMM GRANULOCYTES NFR BLD AUTO: 0 % (ref 0–0.5)
KETONES UR QL STRIP.AUTO: NEGATIVE MG/DL
KOH PREP SPEC: NORMAL
LACTATE SERPL-SCNC: 0.6 MMOL/L (ref 0.4–2)
LACTATE SERPL-SCNC: 3.6 MMOL/L (ref 0.4–2)
LEUKOCYTE ESTERASE UR QL STRIP.AUTO: NEGATIVE
LYMPHOCYTES # BLD: 4.2 K/UL (ref 0.8–3.5)
LYMPHOCYTES NFR BLD: 36 % (ref 12–49)
MAGNESIUM SERPL-MCNC: 2 MG/DL (ref 1.6–2.4)
MCH RBC QN AUTO: 27.4 PG (ref 26–34)
MCHC RBC AUTO-ENTMCNC: 31.9 G/DL (ref 30–36.5)
MCV RBC AUTO: 85.9 FL (ref 80–99)
MONOCYTES # BLD: 0.7 K/UL (ref 0–1)
MONOCYTES NFR BLD: 6 % (ref 5–13)
NEUTS SEG # BLD: 6.6 K/UL (ref 1.8–8)
NEUTS SEG NFR BLD: 55 % (ref 32–75)
NITRITE UR QL STRIP.AUTO: NEGATIVE
NRBC # BLD: 0 K/UL (ref 0–0.01)
NRBC BLD-RTO: 0 PER 100 WBC
PH UR STRIP: 6 [PH] (ref 5–8)
PLATELET # BLD AUTO: 364 K/UL (ref 150–400)
PMV BLD AUTO: 9.5 FL (ref 8.9–12.9)
POTASSIUM SERPL-SCNC: 3.8 MMOL/L (ref 3.5–5.1)
PROT SERPL-MCNC: 7 G/DL (ref 6.4–8.2)
PROT UR STRIP-MCNC: NEGATIVE MG/DL
RBC # BLD AUTO: 3.98 M/UL (ref 3.8–5.2)
RBC #/AREA URNS HPF: ABNORMAL /HPF (ref 0–5)
SAMPLES BEING HELD,HOLD: NORMAL
SERVICE CMNT-IMP: NORMAL
SODIUM SERPL-SCNC: 140 MMOL/L (ref 136–145)
SP GR UR REFRACTOMETRY: 1.02 (ref 1–1.03)
T VAGINALIS VAG QL WET PREP: NORMAL
UR CULT HOLD, URHOLD: NORMAL
UROBILINOGEN UR QL STRIP.AUTO: 0.2 EU/DL (ref 0.2–1)
WBC # BLD AUTO: 11.9 K/UL (ref 3.6–11)
WBC URNS QL MICRO: ABNORMAL /HPF (ref 0–4)

## 2021-07-26 PROCEDURE — 74011000636 HC RX REV CODE- 636: Performed by: RADIOLOGY

## 2021-07-26 PROCEDURE — 83605 ASSAY OF LACTIC ACID: CPT

## 2021-07-26 PROCEDURE — 76830 TRANSVAGINAL US NON-OB: CPT

## 2021-07-26 PROCEDURE — 80053 COMPREHEN METABOLIC PANEL: CPT

## 2021-07-26 PROCEDURE — 87210 SMEAR WET MOUNT SALINE/INK: CPT

## 2021-07-26 PROCEDURE — 81001 URINALYSIS AUTO W/SCOPE: CPT

## 2021-07-26 PROCEDURE — 76856 US EXAM PELVIC COMPLETE: CPT

## 2021-07-26 PROCEDURE — 74177 CT ABD & PELVIS W/CONTRAST: CPT

## 2021-07-26 PROCEDURE — 96361 HYDRATE IV INFUSION ADD-ON: CPT

## 2021-07-26 PROCEDURE — 96375 TX/PRO/DX INJ NEW DRUG ADDON: CPT

## 2021-07-26 PROCEDURE — 83735 ASSAY OF MAGNESIUM: CPT

## 2021-07-26 PROCEDURE — 96374 THER/PROPH/DIAG INJ IV PUSH: CPT

## 2021-07-26 PROCEDURE — 85025 COMPLETE CBC W/AUTO DIFF WBC: CPT

## 2021-07-26 PROCEDURE — 99284 EMERGENCY DEPT VISIT MOD MDM: CPT

## 2021-07-26 PROCEDURE — 74011250637 HC RX REV CODE- 250/637: Performed by: EMERGENCY MEDICINE

## 2021-07-26 PROCEDURE — 74011250636 HC RX REV CODE- 250/636: Performed by: NURSE PRACTITIONER

## 2021-07-26 PROCEDURE — 87491 CHLMYD TRACH DNA AMP PROBE: CPT

## 2021-07-26 PROCEDURE — 36415 COLL VENOUS BLD VENIPUNCTURE: CPT

## 2021-07-26 RX ORDER — ONDANSETRON 2 MG/ML
4 INJECTION INTRAMUSCULAR; INTRAVENOUS
Status: COMPLETED | OUTPATIENT
Start: 2021-07-26 | End: 2021-07-26

## 2021-07-26 RX ORDER — HYDROCODONE BITARTRATE AND ACETAMINOPHEN 5; 325 MG/1; MG/1
1 TABLET ORAL
Status: COMPLETED | OUTPATIENT
Start: 2021-07-26 | End: 2021-07-26

## 2021-07-26 RX ORDER — METRONIDAZOLE 250 MG/1
500 TABLET ORAL
Status: COMPLETED | OUTPATIENT
Start: 2021-07-26 | End: 2021-07-26

## 2021-07-26 RX ORDER — DOXYCYCLINE HYCLATE 100 MG
100 TABLET ORAL EVERY 12 HOURS
Status: DISCONTINUED | OUTPATIENT
Start: 2021-07-26 | End: 2021-07-26

## 2021-07-26 RX ORDER — DOXYCYCLINE HYCLATE 100 MG
100 TABLET ORAL 2 TIMES DAILY
Qty: 20 TABLET | Refills: 0 | Status: SHIPPED | OUTPATIENT
Start: 2021-07-26 | End: 2021-08-05

## 2021-07-26 RX ORDER — METRONIDAZOLE 500 MG/1
500 TABLET ORAL 2 TIMES DAILY
Qty: 14 TABLET | Refills: 0 | Status: SHIPPED | OUTPATIENT
Start: 2021-07-26 | End: 2021-08-02

## 2021-07-26 RX ORDER — DOXYCYCLINE HYCLATE 100 MG
100 TABLET ORAL ONCE
Status: COMPLETED | OUTPATIENT
Start: 2021-07-26 | End: 2021-07-26

## 2021-07-26 RX ORDER — MORPHINE SULFATE 2 MG/ML
2 INJECTION, SOLUTION INTRAMUSCULAR; INTRAVENOUS
Status: COMPLETED | OUTPATIENT
Start: 2021-07-26 | End: 2021-07-26

## 2021-07-26 RX ORDER — HYDROCODONE BITARTRATE AND ACETAMINOPHEN 5; 325 MG/1; MG/1
1 TABLET ORAL
Qty: 10 TABLET | Refills: 0 | Status: SHIPPED | OUTPATIENT
Start: 2021-07-26 | End: 2021-07-29

## 2021-07-26 RX ADMIN — ONDANSETRON 4 MG: 2 INJECTION INTRAMUSCULAR; INTRAVENOUS at 01:35

## 2021-07-26 RX ADMIN — SODIUM CHLORIDE 1000 ML: 9 INJECTION, SOLUTION INTRAVENOUS at 02:40

## 2021-07-26 RX ADMIN — SODIUM CHLORIDE 1000 ML: 9 INJECTION, SOLUTION INTRAVENOUS at 01:37

## 2021-07-26 RX ADMIN — METRONIDAZOLE 500 MG: 250 TABLET ORAL at 04:48

## 2021-07-26 RX ADMIN — IOPAMIDOL 100 ML: 755 INJECTION, SOLUTION INTRAVENOUS at 02:06

## 2021-07-26 RX ADMIN — DOXYCYCLINE HYCLATE 100 MG: 100 TABLET, COATED ORAL at 04:48

## 2021-07-26 RX ADMIN — MORPHINE SULFATE 2 MG: 2 INJECTION, SOLUTION INTRAMUSCULAR; INTRAVENOUS at 01:38

## 2021-07-26 RX ADMIN — HYDROCODONE BITARTRATE AND ACETAMINOPHEN 1 TABLET: 5; 325 TABLET ORAL at 04:48

## 2021-07-26 NOTE — ED PROVIDER NOTES
HPI     Preeti Ibanez is a 36 y.o. female with Hx of diabetes, anemia, HSV, PCOS, sleep apnea who presents ambulatory w/ SO to [facility] ED with cc of pelvic pain. Pt reports I&D this last Thursday. Notes ongoing pelvic pain that has not been controlled w/ Tylenol or Motrin since hospital discharge. States that she has called on call OBGYN x2 for follow up. First conversation, pt states she had not had a BM. Told to get evaluated in ED tonight. Also notes that she has been constipated, took Mg Citrate and had several BMs today. Pt describes the pain as pressure in her lower abdomen that worsens w/ movement. Notes that her vaginal bleeding has been light since the surgery. Pt reports dysuria, states more associated w/ pressure in lower abdomen. Denies any fevers, chills, N/V/D, vaginal discharge, cough, congestion. PCP: Ileana Mccormack MD    There are no other complaints, changes or physical findings at this time.       Past Medical History:   Diagnosis Date    Abnormal pap 1/2014+ 2/2015    LGSIL pap    Anemia NEC     Chronic pain     Depression     Diabetes ([address]     HPV vaccine counseling     gardasil series complete     HSV-2 infection     [facility] [address]     PCOS (polycystic ovarian syndrome)     Prediabetes     Sleep disorder     sleep apnea    Vitamin D deficiency        Past Surgical History:   Procedure Laterality Date    HX COLPOSCOPY  2/2014 + 4/2015    YAHAIRA I; 2015 neg path         Family History:   Problem Relation Age of Onset    No Known Problems Mother     Heart Disease Father     Diabetes Father        Social History     Socioeconomic History    Marital status:      Spouse name: Not on file    Number of children: Not on file    Years of education: Not on file    Highest education level: Not on file   Occupational History    Not on file   Tobacco Use    Smoking status: Never Smoker    Smokeless tobacco: Never Used   Vaping Use    Vaping Use: Never used Substance and Sexual Activity    Alcohol use: Yes     Alcohol/week: 0.0 standard drinks     Comment: socially    Drug use: Not on file    Sexual activity: Yes     Partners: Male     Birth control/protection: Condom   Other Topics Concern    Not on file   Social History Narrative    ** Merged History Encounter **          Social Determinants of Health     Financial Resource Strain:     Difficulty of Paying Living Expenses:    Food Insecurity:     Worried About Running Out of Food in the Last Year:     Ran Out of Food in the Last Year:    Transportation Needs:     Lack of Transportation (Medical):  Lack of Transportation (Non-Medical):    Physical Activity:     Days of Exercise per Week:     Minutes of Exercise per Session:    Stress:     Feeling of Stress :    Social Connections:     Frequency of Communication with Friends and Family:     Frequency of Social Gatherings with Friends and Family:     Attends Jew Services:     Active Member of Clubs or Organizations:     Attends Club or Organization Meetings:     Marital Status:    Intimate Partner Violence:     Fear of Current or Ex-Partner:     Emotionally Abused:     Physically Abused:     Sexually Abused: ALLERGIES: Aspirin    Review of Systems   Constitutional: Negative for activity change, appetite change, chills and fever. HENT: Negative for congestion, rhinorrhea, sinus pressure, sneezing and sore throat. Respiratory: Negative for cough and shortness of breath. Cardiovascular: Negative for chest pain. Gastrointestinal: Negative for abdominal pain, diarrhea, nausea and vomiting. Genitourinary: Positive for pelvic pain. Negative for dysuria, flank pain, frequency and urgency. Musculoskeletal: Negative for arthralgias, back pain, gait problem, joint swelling, myalgias and neck pain. Skin: Negative for color change and rash.    Neurological: Negative for dizziness, speech difficulty, weakness, light-headedness, numbness and headaches. Psychiatric/Behavioral: Negative for agitation, behavioral problems and confusion. All other systems reviewed and are negative. Vitals:    07/26/21 0106   BP: 135/85   Pulse: 93   Resp: 16   Temp: 98.5 °F (36.9 °C)   SpO2: 99%   Weight: 141 kg (310 lb 12.8 oz)   Height: 5' 5\" (1.651 m)            Physical Exam  Vitals and nursing note reviewed. Constitutional:       General: She is not in acute distress. Appearance: She is well-developed. HENT:      Head: Normocephalic and atraumatic. Right Ear: External ear normal.      Left Ear: External ear normal.      Nose: Nose normal.   Eyes:      Conjunctiva/sclera: Conjunctivae normal.      Pupils: Pupils are equal, round, and reactive to light. Cardiovascular:      Rate and Rhythm: Normal rate and regular rhythm. Heart sounds: Normal heart sounds. Pulmonary:      Effort: Pulmonary effort is normal.      Breath sounds: Normal breath sounds. Abdominal:      General: Bowel sounds are normal.      Palpations: Abdomen is soft. Tenderness: There is no abdominal tenderness. There is no guarding or rebound. Musculoskeletal:         General: Normal range of motion. Cervical back: Normal range of motion and neck supple. Skin:     General: Skin is warm and dry. Neurological:      Mental Status: She is alert and oriented to person, place, and time. Psychiatric:         Behavior: Behavior normal.         Thought Content: Thought content normal.         Judgment: Judgment normal.          MDM  Number of Diagnoses or Management Options  Pelvic pain  Diagnosis management comments: Ddx: endometritis, pelvic mass, uterine perforation, dehydration, UTI     CT w/o any acute findings; WBC 11; lactate elevated but this could 2/2 dehydration post operatively   Pain relieved post Morphine, ongoing pelvic pressure   3:03 AM  Change of shift. Care of patient given to [facility] MD; H&P reviewed,  handoff complete.   Awaiting recheck lactate and pelvic exam. Pt aware Dr. Manriquez Patches to assume care. Amount and/or Complexity of Data Reviewed  Clinical lab tests: ordered and reviewed  Tests in the radiology section of CPT®: ordered and reviewed  Review and summarize past medical records: yes  Discuss the patient with other providers: yes Sujatha Young )           Procedures    LABORATORY TESTS:  Recent Results (from the past 12 hour(s))   CBC WITH AUTOMATED DIFF    Collection Time: 07/26/21  1:13 AM   Result Value Ref Range    WBC 11.9 (H) 3.6 - 11.0 K/uL    RBC 3.98 3.80 - 5.20 M/uL    HGB 10.9 (L) 11.5 - 16.0 g/dL    HCT 34.2 (L) 35.0 - 47.0 %    MCV 85.9 80.0 - 99.0 FL    MCH 27.4 26.0 - 34.0 PG    MCHC 31.9 30.0 - 36.5 g/dL    RDW 14.8 (H) 11.5 - 14.5 %    PLATELET 620 529 - 134 K/uL    MPV 9.5 8.9 - 12.9 FL    NRBC 0.0 0  WBC    ABSOLUTE NRBC 0.00 0.00 - 0.01 K/uL    NEUTROPHILS 55 32 - 75 %    LYMPHOCYTES 36 12 - 49 %    MONOCYTES 6 5 - 13 %    EOSINOPHILS 3 0 - 7 %    BASOPHILS 0 0 - 1 %    IMMATURE GRANULOCYTES 0 0.0 - 0.5 %    ABS. NEUTROPHILS 6.6 1.8 - 8.0 K/UL    ABS. LYMPHOCYTES 4.2 (H) 0.8 - 3.5 K/UL    ABS. MONOCYTES 0.7 0.0 - 1.0 K/UL    ABS. EOSINOPHILS 0.3 0.0 - 0.4 K/UL    ABS. BASOPHILS 0.0 0.0 - 0.1 K/UL    ABS. IMM. GRANS. 0.1 (H) 0.00 - 0.04 K/UL    DF AUTOMATED     METABOLIC PANEL, COMPREHENSIVE    Collection Time: 07/26/21  1:13 AM   Result Value Ref Range    Sodium 140 136 - 145 mmol/L    Potassium 3.8 3.5 - 5.1 mmol/L    Chloride 108 97 - 108 mmol/L    CO2 26 21 - 32 mmol/L    Anion gap 6 5 - 15 mmol/L    Glucose 119 (H) 65 - 100 mg/dL    BUN 10 6 - 20 MG/DL    Creatinine 0.57 0.55 - 1.02 MG/DL    BUN/Creatinine ratio 18 12 - 20      GFR est AA >60 >60 ml/min/1.73m2    GFR est non-AA >60 >60 ml/min/1.73m2    Calcium 8.4 (L) 8.5 - 10.1 MG/DL    Bilirubin, total 0.2 0.2 - 1.0 MG/DL    ALT (SGPT) 14 12 - 78 U/L    AST (SGOT) 6 (L) 15 - 37 U/L    Alk.  phosphatase 91 45 - 117 U/L    Protein, total 7.0 6.4 - 8.2 g/dL Albumin 3.0 (L) 3.5 - 5.0 g/dL    Globulin 4.0 2.0 - 4.0 g/dL    A-G Ratio 0.8 (L) 1.1 - 2.2     MAGNESIUM    Collection Time: 07/26/21  1:13 AM   Result Value Ref Range    Magnesium 2.0 1.6 - 2.4 mg/dL   LACTIC ACID    Collection Time: 07/26/21  1:13 AM   Result Value Ref Range    Lactic acid 3.6 (HH) 0.4 - 2.0 MMOL/L   SAMPLES BEING HELD    Collection Time: 07/26/21  1:13 AM   Result Value Ref Range    SAMPLES BEING HELD  1bc (1blu 1 pur)     COMMENT        Add-on orders for these samples will be processed based on acceptable specimen integrity and analyte stability, which may vary by analyte. URINALYSIS W/MICROSCOPIC    Collection Time: 07/26/21  2:10 AM   Result Value Ref Range    Color YELLOW/STRAW      Appearance CLEAR CLEAR      Specific gravity 1.019 1.003 - 1.030      pH (UA) 6.0 5.0 - 8.0      Protein Negative NEG mg/dL    Glucose Negative NEG mg/dL    Ketone Negative NEG mg/dL    Bilirubin Negative NEG      Blood MODERATE (A) NEG      Urobilinogen 0.2 0.2 - 1.0 EU/dL    Nitrites Negative NEG      Leukocyte Esterase Negative NEG      WBC 0-4 0 - 4 /hpf    RBC 0-5 0 - 5 /hpf    Epithelial cells FEW FEW /lpf    Bacteria Negative NEG /hpf   URINE CULTURE HOLD SAMPLE    Collection Time: 07/26/21  2:10 AM    Specimen: Serum; Urine   Result Value Ref Range    Urine culture hold        Urine on hold in Microbiology dept for 2 days. If unpreserved urine is submitted, it cannot be used for addtional testing after 24 hours, recollection will be required. IMAGING RESULTS:  CT ABD PELV W CONT   Final Result   No acute intraperitoneal process is identified. Please see above for additional nonemergent incidental findings.               MEDICATIONS GIVEN:  Medications   sodium chloride 0.9 % bolus infusion 1,000 mL (1,000 mL IntraVENous New Bag 7/26/21 0240)   sodium chloride 0.9 % bolus infusion 1,000 mL (1,000 mL IntraVENous New Bag 7/26/21 0137)   morphine injection 2 mg (2 mg IntraVENous Given 7/26/21 0138)   ondansetron (ZOFRAN) injection 4 mg (4 mg IntraVENous Given 7/26/21 0135)   iopamidoL (ISOVUE-370) 76 % injection 100 mL (100 mL IntraVENous Given 7/26/21 0206)       IMPRESSION:  1.  Pelvic pain        Deonte Phelps, NP

## 2021-07-26 NOTE — DISCHARGE INSTRUCTIONS
We hope that we have addressed all of your medical concerns. The examination and treatment you received in the Emergency Department were for an emergent problem and were not intended as complete care. It is important that you follow up with your healthcare provider(s) for ongoing care. If your symptoms worsen or do not improve as expected, and you are unable to reach your usual health care provider(s), you should return to the Emergency Department. Today's healthcare is undergoing tremendous change, and patient satisfaction surveys are one of the many tools to assess the quality of medical care. You may receive a survey from the [employer] organization regarding your experience in the Emergency Department. I hope that your experience has been completely positive, particularly the medical care that I provided. As such, please participate in the survey; anything less than excellent does not meet my expectations or intentions. [facility] and [facility] participate in nationally recognized quality of care measures. If your blood pressure is greater than 120/80, as reported below, we urge that you seek medical care to address the potential of high blood pressure, commonly known as hypertension. Hypertension can be hereditary or can be caused by certain medical conditions, pain, stress, or \"white coat syndrome. \"       Please make an appointment with your health care provider(s) for follow up of your Emergency Department visit. VITALS:   Patient Vitals for the past 8 hrs:   Temp Pulse Resp BP SpO2   07/26/21 0106 98.5 °F (36.9 °C) 93 16 135/85 99 %          Thank you for allowing us to provide you with medical care today. We realize that you have many choices for your emergency care needs. Please choose us in the future for any continued health care needs. Trude Quail Cheryle Proud, [address] [place] [address] [address]   Office: [phone]            Recent Results (from the past 24 hour(s))   CBC WITH AUTOMATED DIFF    Collection Time: 07/26/21  1:13 AM   Result Value Ref Range    WBC 11.9 (H) 3.6 - 11.0 K/uL    RBC 3.98 3.80 - 5.20 M/uL    HGB 10.9 (L) 11.5 - 16.0 g/dL    HCT 34.2 (L) 35.0 - 47.0 %    MCV 85.9 80.0 - 99.0 FL    MCH 27.4 26.0 - 34.0 PG    MCHC 31.9 30.0 - 36.5 g/dL    RDW 14.8 (H) 11.5 - 14.5 %    PLATELET 255 394 - 419 K/uL    MPV 9.5 8.9 - 12.9 FL    NRBC 0.0 0  WBC    ABSOLUTE NRBC 0.00 0.00 - 0.01 K/uL    NEUTROPHILS 55 32 - 75 %    LYMPHOCYTES 36 12 - 49 %    MONOCYTES 6 5 - 13 %    EOSINOPHILS 3 0 - 7 %    BASOPHILS 0 0 - 1 %    IMMATURE GRANULOCYTES 0 0.0 - 0.5 %    ABS. NEUTROPHILS 6.6 1.8 - 8.0 K/UL    ABS. LYMPHOCYTES 4.2 (H) 0.8 - 3.5 K/UL    ABS. MONOCYTES 0.7 0.0 - 1.0 K/UL    ABS. EOSINOPHILS 0.3 0.0 - 0.4 K/UL    ABS. BASOPHILS 0.0 0.0 - 0.1 K/UL    ABS. IMM. GRANS. 0.1 (H) 0.00 - 0.04 K/UL    DF AUTOMATED     METABOLIC PANEL, COMPREHENSIVE    Collection Time: 07/26/21  1:13 AM   Result Value Ref Range    Sodium 140 136 - 145 mmol/L    Potassium 3.8 3.5 - 5.1 mmol/L    Chloride 108 97 - 108 mmol/L    CO2 26 21 - 32 mmol/L    Anion gap 6 5 - 15 mmol/L    Glucose 119 (H) 65 - 100 mg/dL    BUN 10 6 - 20 MG/DL    Creatinine 0.57 0.55 - 1.02 MG/DL    BUN/Creatinine ratio 18 12 - 20      GFR est AA >60 >60 ml/min/1.73m2    GFR est non-AA >60 >60 ml/min/1.73m2    Calcium 8.4 (L) 8.5 - 10.1 MG/DL    Bilirubin, total 0.2 0.2 - 1.0 MG/DL    ALT (SGPT) 14 12 - 78 U/L    AST (SGOT) 6 (L) 15 - 37 U/L    Alk.  phosphatase 91 45 - 117 U/L    Protein, total 7.0 6.4 - 8.2 g/dL    Albumin 3.0 (L) 3.5 - 5.0 g/dL    Globulin 4.0 2.0 - 4.0 g/dL    A-G Ratio 0.8 (L) 1.1 - 2.2     MAGNESIUM    Collection Time: 07/26/21  1:13 AM   Result Value Ref Range    Magnesium 2.0 1.6 - 2.4 mg/dL   LACTIC ACID    Collection Time: 07/26/21  1:13 AM   Result Value Ref Range    Lactic acid 3.6 (HH) 0.4 - 2.0 MMOL/L   SAMPLES BEING HELD Collection Time: 07/26/21  1:13 AM   Result Value Ref Range    SAMPLES BEING HELD  1bc (1blu 1 pur)     COMMENT        Add-on orders for these samples will be processed based on acceptable specimen integrity and analyte stability, which may vary by analyte. URINALYSIS W/MICROSCOPIC    Collection Time: 07/26/21  2:10 AM   Result Value Ref Range    Color YELLOW/STRAW      Appearance CLEAR CLEAR      Specific gravity 1.019 1.003 - 1.030      pH (UA) 6.0 5.0 - 8.0      Protein Negative NEG mg/dL    Glucose Negative NEG mg/dL    Ketone Negative NEG mg/dL    Bilirubin Negative NEG      Blood MODERATE (A) NEG      Urobilinogen 0.2 0.2 - 1.0 EU/dL    Nitrites Negative NEG      Leukocyte Esterase Negative NEG      WBC 0-4 0 - 4 /hpf    RBC 0-5 0 - 5 /hpf    Epithelial cells FEW FEW /lpf    Bacteria Negative NEG /hpf   URINE CULTURE HOLD SAMPLE    Collection Time: 07/26/21  2:10 AM    Specimen: Serum; Urine   Result Value Ref Range    Urine culture hold        Urine on hold in Microbiology dept for 2 days. If unpreserved urine is submitted, it cannot be used for addtional testing after 24 hours, recollection will be required. LACTIC ACID    Collection Time: 07/26/21  3:12 AM   Result Value Ref Range    Lactic acid 0.6 0.4 - 2.0 MMOL/L       CT ABD PELV W CONT    Result Date: 7/26/2021  CLINICAL HISTORY: pelvic pain after D/C; r/o perforation INDICATION: pelvic pain after D/C; r/o perforation COMPARISON: None. CONTRAST: 100  ml Isovue 370 TECHNIQUE: Multislice helical CT was performed of the abdomen and pelvis following uneventful rapid bolus intravenous contrast administration. Oral contrast was not administered. Contiguous 5 mm axial images were reconstructed and lung and soft tissue windows were generated. Coronal and sagittal reformations were generated. CT dose reduction was achieved through use of a standardized protocol tailored for this examination and automatic exposure control for dose modulation. FINDINGS: LUNG BASES:No mass lesion or effusion. LIVER: Hepatic steatosis. There is no intrahepatic duct dilatation. There is no hepatic parenchymal mass. Hepatic enhancement pattern is within normal limits. Portal vein is patent. GALLBLADDER:  No dilatation or wall thickening. SPLEEN/PANCREAS: No mass. There is no pancreatic duct dilatation. There is no evidence of splenomegaly. ADRENALS/KIDNEYS: No mass. There is no hydronephrosis. There is no renal mass. There is no perinephric mass. STOMACH: No dilatation or wall thickening. COLON AND SMALL BOWEL: No dilatation or wall thickening. Few colonic diverticula. There is no free intraperitoneal air. There is no evidence of incarceration or obstruction. No mesenteric adenopathy. PERITONEUM: Fat-containing umbilical hernia. APPENDIX: Unremarkable. BLADDER/REPRODUCTIVE ORGANS: Fullness of the endometrial canal is consistent with recent D and C. RETROPERITONEUM: Unremarkable. The abdominal aorta is normal in caliber. No aneurysm. No retroperitoneal adenopathy. OSSEOUS STRUCTURES: No destructive bone lesion. No acute intraperitoneal process is identified. Please see above for additional nonemergent incidental findings.

## 2021-07-28 LAB
C TRACH RRNA SPEC QL NAA+PROBE: NEGATIVE
N GONORRHOEA RRNA SPEC QL NAA+PROBE: NEGATIVE
PLEASE NOTE:, 188601: NORMAL
SPECIMEN SOURCE: NORMAL

## 2021-08-19 ENCOUNTER — OFFICE VISIT (OUTPATIENT)
Dept: FAMILY MEDICINE CLINIC | Age: 31
End: 2021-08-19
Payer: COMMERCIAL

## 2021-08-19 VITALS
OXYGEN SATURATION: 100 % | BODY MASS INDEX: 51.85 KG/M2 | TEMPERATURE: 98.8 F | HEART RATE: 81 BPM | RESPIRATION RATE: 20 BRPM | WEIGHT: 293 LBS | SYSTOLIC BLOOD PRESSURE: 123 MMHG | DIASTOLIC BLOOD PRESSURE: 77 MMHG

## 2021-08-19 DIAGNOSIS — F41.9 ANXIETY: ICD-10-CM

## 2021-08-19 DIAGNOSIS — M54.41 CHRONIC BILATERAL LOW BACK PAIN WITH RIGHT-SIDED SCIATICA: ICD-10-CM

## 2021-08-19 DIAGNOSIS — N96 HISTORY OF RECURRENT MISCARRIAGES: ICD-10-CM

## 2021-08-19 DIAGNOSIS — F32.A DEPRESSION, UNSPECIFIED DEPRESSION TYPE: ICD-10-CM

## 2021-08-19 DIAGNOSIS — Z00.00 WELL WOMAN EXAM (NO GYNECOLOGICAL EXAM): Primary | ICD-10-CM

## 2021-08-19 DIAGNOSIS — G89.29 CHRONIC BILATERAL LOW BACK PAIN WITH RIGHT-SIDED SCIATICA: ICD-10-CM

## 2021-08-19 DIAGNOSIS — E11.9 TYPE 2 DIABETES MELLITUS WITHOUT COMPLICATION, WITHOUT LONG-TERM CURRENT USE OF INSULIN (HCC): ICD-10-CM

## 2021-08-19 PROCEDURE — 99214 OFFICE O/P EST MOD 30 MIN: CPT | Performed by: FAMILY MEDICINE

## 2021-08-19 PROCEDURE — 99395 PREV VISIT EST AGE 18-39: CPT | Performed by: FAMILY MEDICINE

## 2021-08-19 RX ORDER — CYCLOBENZAPRINE HCL 5 MG
TABLET ORAL
COMMUNITY
Start: 2021-06-09 | End: 2022-08-11

## 2021-08-19 RX ORDER — LIDOCAINE 50 MG/G
PATCH TOPICAL
COMMUNITY
Start: 2021-07-31

## 2021-08-19 RX ORDER — DULOXETIN HYDROCHLORIDE 30 MG/1
30 CAPSULE, DELAYED RELEASE ORAL DAILY
Qty: 90 CAPSULE | Refills: 1 | Status: SHIPPED | OUTPATIENT
Start: 2021-08-19 | End: 2022-05-05 | Stop reason: SDUPTHER

## 2021-08-19 NOTE — PROGRESS NOTES
Chief Complaint   Patient presents with    Complete Physical     1. Have you been to the ER, urgent care clinic since your last visit? Hospitalized since your last visit? Yes 07/22/2021 [facility]     2. Have you seen or consulted any other health care providers outside of the [address] [place] [address] since your last visit? Include any pap smears or colon screening.  No  \

## 2021-08-19 NOTE — PROGRESS NOTES
Subjective:   36 y.o. female for Well Woman Check. Her gyne and breast care is done elsewhere by her Ob-Gyne physician. Patient Active Problem List   Diagnosis Code    LGSIL (low grade squamous intraepithelial lesion) on Pap smear [id number]    Obesity, morbid (HCC) E66.01    PCOS (polycystic ovarian syndrome) E28.2    Depression F32.9    Type 2 diabetes mellitus (HCC) E11.9    Abnormal weight gain R63.5    Anxiety F41.9    Irregular periods N92.6    Menorrhagia N92.0    Vitamin D deficiency E55.9    Missed  O02.1     Past Medical History:   Diagnosis Date    Abnormal pap 2014+ 2015    LGSIL pap    Anemia NEC     Chronic pain     Depression     Diabetes ([facility] [address]     HPV vaccine counseling     gardasil series complete     HSV-2 infection     Morbid jealousy ([facility] [address]     PCOS (polycystic ovarian syndrome)     Prediabetes     Sleep disorder     sleep apnea    Vitamin D deficiency      Past Surgical History:   Procedure Laterality Date    HX COLPOSCOPY  2014 + 2015    YAHAIRA I; 2015 neg path     Family History   Problem Relation Age of Onset    No Known Problems Mother     Heart Disease Father     Diabetes Father      Social History     Tobacco Use    Smoking status: Never Smoker    Smokeless tobacco: Never Used   Substance Use Topics    Alcohol use: Yes     Alcohol/week: 0.0 standard drinks     Comment: socially            ROS: Feeling generally well. No TIA's or unusual headaches, no dysphagia. No prolonged cough. No dyspnea or chest pain on exertion. No abdominal pain, change in bowel habits, black or bloody stools. No urinary tract symptoms. No new or unusual musculoskeletal symptoms. Specific concerns today: pain--following with pain mgmt--she is reporting to me that she has been out of work for a few weeks because she is in a lot of pain  .  She recently had an xray--she is waiting to find out those results, she was recently recommended to go back to pt and get some dry needling. Pain is disrupting her sleep. She's working with her pain mgmt physician to get this straight now    Anxiety/depression/grief--started seeing a mental health counselor after her first miscarriage in January, has just had a 2nd miscarriage  Would like to go back on cymbalta and see how that goes    Lab Results   Component Value Date/Time    Hemoglobin A1c 8.1 (H) 10/16/2020 11:14 AM    Hemoglobin A1c 6.9 (H) 07/06/2020 09:13 AM    Hemoglobin A1c 8.2 (H) 01/06/2020 12:56 PM    Glucose 119 (H) 07/26/2021 01:13 AM    Glucose (POC) 90 07/22/2021 04:29 PM    Microalbumin/Creat ratio (mg/g creat) 14 02/20/2020 10:09 AM    Microalbumin,urine random 0.80 02/20/2020 10:09 AM    LDL, calculated 83 09/11/2019 11:51 AM    Creatinine 0.57 07/26/2021 01:13 AM     Her last a1c was     Objective: The patient appears well, alert, oriented x 3, in no distress. Visit Vitals  /77 (BP 1 Location: Left arm, BP Patient Position: Sitting, BP Cuff Size: Thigh)   Pulse 81   Temp 98.8 °F (37.1 °C) (Temporal)   Resp 20   Wt 311 lb 9.6 oz (141.3 kg)   LMP  (LMP Unknown)   SpO2 100%   BMI 51.85 kg/m²     Obese, ENT normal.  Neck supple. No adenopathy or thyromegaly. KENIA. Lungs are clear, good air entry, no wheezes, rhonchi or rales. S1 and S2 normal, no murmurs, regular rate and rhythm. Abdomen soft without tenderness, guarding, mass or organomegaly. Extremities show no edema, normal peripheral pulses. Neurological is normal, no focal findings. Breast and Pelvic exams are deferred. Assessment/Plan:   Well Woman  lose weight, increase physical activity, routine labs ordered, call if any problems  1. Well woman exam (no gynecological exam)  Continue to see all specialists and obgyn  Labs routine per order      2. Type 2 diabetes mellitus without complication, without long-term current use of insulin (HCC)  C/w current meds per orders and labs per orders    3. Depression, unspecified depression type  4.  Chronic bilateral low back pain with right-sided sciatica  5. Anxiety  Restart cymbalta at 30, 6 wk check in , can up titrate to 60 at that time if needed, call if problems before then, ok if virtual    6.  History of recurrent miscarriages  i'm sorry you're going through this, follow with Dr Maryann Loco, pending workup

## 2021-10-11 ENCOUNTER — VIRTUAL VISIT (OUTPATIENT)
Dept: FAMILY MEDICINE CLINIC | Age: 31
End: 2021-10-11
Payer: COMMERCIAL

## 2021-10-11 DIAGNOSIS — F41.9 ANXIETY: ICD-10-CM

## 2021-10-11 DIAGNOSIS — E11.9 TYPE 2 DIABETES MELLITUS WITHOUT COMPLICATION, WITHOUT LONG-TERM CURRENT USE OF INSULIN (HCC): Primary | ICD-10-CM

## 2021-10-11 DIAGNOSIS — F32.A DEPRESSION, UNSPECIFIED DEPRESSION TYPE: ICD-10-CM

## 2021-10-11 DIAGNOSIS — N96 HISTORY OF RECURRENT MISCARRIAGES: ICD-10-CM

## 2021-10-11 PROBLEM — O02.1 MISSED ABORTION: Status: RESOLVED | Noted: 2021-07-22 | Resolved: 2021-10-11

## 2021-10-11 PROCEDURE — 99214 OFFICE O/P EST MOD 30 MIN: CPT | Performed by: FAMILY MEDICINE

## 2021-10-11 NOTE — PROGRESS NOTES
Chief Complaint   Patient presents with    Follow-up     fu from physical and has no health concerns at this time.   DM controlled well with medication

## 2022-02-22 DIAGNOSIS — E11.9 TYPE 2 DIABETES MELLITUS WITHOUT COMPLICATION, WITHOUT LONG-TERM CURRENT USE OF INSULIN (HCC): ICD-10-CM

## 2022-02-23 LAB
CHOLEST SERPL-MCNC: 159 MG/DL
ERYTHROCYTE [DISTWIDTH] IN BLOOD BY AUTOMATED COUNT: 15.1 % (ref 11.5–14.5)
EST. AVERAGE GLUCOSE BLD GHB EST-MCNC: 169 MG/DL
HBA1C MFR BLD: 7.5 % (ref 4–5.6)
HCT VFR BLD AUTO: 37.4 % (ref 35–47)
HDLC SERPL-MCNC: 51 MG/DL
HDLC SERPL: 3.1 {RATIO} (ref 0–5)
HGB BLD-MCNC: 11.4 G/DL (ref 11.5–16)
LDLC SERPL CALC-MCNC: 92.8 MG/DL (ref 0–100)
MCH RBC QN AUTO: 26.3 PG (ref 26–34)
MCHC RBC AUTO-ENTMCNC: 30.5 G/DL (ref 30–36.5)
MCV RBC AUTO: 86.4 FL (ref 80–99)
NRBC # BLD: 0 K/UL (ref 0–0.01)
NRBC BLD-RTO: 0 PER 100 WBC
PLATELET # BLD AUTO: 459 K/UL (ref 150–400)
PMV BLD AUTO: 9.6 FL (ref 8.9–12.9)
RBC # BLD AUTO: 4.33 M/UL (ref 3.8–5.2)
TRIGL SERPL-MCNC: 76 MG/DL (ref ?–150)
VLDLC SERPL CALC-MCNC: 15.2 MG/DL
WBC # BLD AUTO: 12.5 K/UL (ref 3.6–11)

## 2022-03-18 PROBLEM — E28.2 PCOS (POLYCYSTIC OVARIAN SYNDROME): Status: ACTIVE | Noted: 2017-01-13

## 2022-03-18 PROBLEM — E11.9 TYPE 2 DIABETES MELLITUS (HCC): Status: ACTIVE | Noted: 2018-01-24

## 2022-03-18 PROBLEM — R63.5 ABNORMAL WEIGHT GAIN: Status: ACTIVE | Noted: 2017-10-19

## 2022-03-18 PROBLEM — N92.0 MENORRHAGIA: Status: ACTIVE | Noted: 2017-11-14

## 2022-03-18 PROBLEM — N92.6 IRREGULAR PERIODS: Status: ACTIVE | Noted: 2017-10-19

## 2022-03-19 PROBLEM — F41.9 ANXIETY: Status: ACTIVE | Noted: 2017-10-25

## 2022-03-19 PROBLEM — E55.9 VITAMIN D DEFICIENCY: Status: ACTIVE | Noted: 2017-10-25

## 2022-03-19 PROBLEM — E66.01 OBESITY, MORBID (HCC): Status: ACTIVE | Noted: 2019-09-11

## 2022-05-05 ENCOUNTER — VIRTUAL VISIT (OUTPATIENT)
Dept: FAMILY MEDICINE CLINIC | Age: 32
End: 2022-05-05
Payer: COMMERCIAL

## 2022-05-05 DIAGNOSIS — G89.29 CHRONIC BILATERAL LOW BACK PAIN WITH RIGHT-SIDED SCIATICA: ICD-10-CM

## 2022-05-05 DIAGNOSIS — F41.9 ANXIETY: ICD-10-CM

## 2022-05-05 DIAGNOSIS — E11.65 TYPE 2 DIABETES MELLITUS WITH HYPERGLYCEMIA, WITHOUT LONG-TERM CURRENT USE OF INSULIN (HCC): Primary | ICD-10-CM

## 2022-05-05 DIAGNOSIS — M54.41 CHRONIC BILATERAL LOW BACK PAIN WITH RIGHT-SIDED SCIATICA: ICD-10-CM

## 2022-05-05 DIAGNOSIS — F32.A DEPRESSION, UNSPECIFIED DEPRESSION TYPE: ICD-10-CM

## 2022-05-05 PROCEDURE — 3051F HG A1C>EQUAL 7.0%<8.0%: CPT | Performed by: FAMILY MEDICINE

## 2022-05-05 PROCEDURE — 99214 OFFICE O/P EST MOD 30 MIN: CPT | Performed by: FAMILY MEDICINE

## 2022-05-05 RX ORDER — DULOXETIN HYDROCHLORIDE 30 MG/1
30 CAPSULE, DELAYED RELEASE ORAL DAILY
Qty: 90 CAPSULE | Refills: 1 | Status: SHIPPED | OUTPATIENT
Start: 2022-05-05 | End: 2022-08-11

## 2022-05-05 RX ORDER — METFORMIN HYDROCHLORIDE 750 MG/1
750 TABLET, EXTENDED RELEASE ORAL
Qty: 180 TABLET | Refills: 3 | Status: SHIPPED | OUTPATIENT
Start: 2022-05-05 | End: 2022-08-11 | Stop reason: DRUGHIGH

## 2022-05-05 NOTE — PROGRESS NOTES
Chief Complaint   Patient presents with    Diabetes     fatigue; polyuria;       Wants to restart cymbalta

## 2022-05-05 NOTE — PROGRESS NOTES
Shelbie Nash is a 29 y.o. female who was seen by synchronous (real-time) audio-video technology on 5/5/2022. Consent: Shelbie Nash, who was seen by synchronous (real-time) audio-video technology, and/or her healthcare decision maker, is aware that this patient-initiated, Telehealth encounter on 5/5/2022 is a billable service, with coverage as determined by her insurance carrier. She is aware that she may receive a bill and has provided verbal consent to proceed: Yes. Assessment & Plan:   1. Depression, unspecified depression type  Uncontrolled of med, restart  - DULoxetine (CYMBALTA) 30 mg capsule; Take 1 Capsule by mouth daily. Dispense: 90 Capsule; Refill: 1    2. Chronic bilateral low back pain with right-sided sciatica  Worse control of med, restart  - DULoxetine (CYMBALTA) 30 mg capsule; Take 1 Capsule by mouth daily. Dispense: 90 Capsule; Refill: 1    3. Anxiety  Worse control off med, restart  - DULoxetine (CYMBALTA) 30 mg capsule; Take 1 Capsule by mouth daily. Dispense: 90 Capsule; Refill: 1    4. Type 2 diabetes mellitus with hyperglycemia, without long-term current use of insulin (HCC)  Symptomatic off med, restart  Labs in 3 m  Then fu visit after  - metFORMIN ER (GLUCOPHAGE XR) 750 mg tablet; Take 1 Tablet by mouth Before breakfast and dinner. Dispense: 180 Tablet; Refill: 3  - HEMOGLOBIN A1C WITH EAG; Future  - METABOLIC PANEL, COMPREHENSIVE; Future  - HEMOGLOBIN A1C WITH EAG  - METABOLIC PANEL, COMPREHENSIVE          Pt was counseled on risks, benefits and alternatives of treatment options. All questions were asked and answered and the patient was agreeable with the treatment plan as outlined.       Subjective:   Shelbie Nash is a 29 y.o. female who was seen for Diabetes (fatigue; polyuria; )      Diabetes    Sugars controlled moderately  Hypoglycemia: none  Tolerating current treatment self dc medication  Current medications include none  Key Antihyperglycemic Medications metFORMIN ER (GLUCOPHAGE XR) 750 mg tablet (Taking) Take 1 Tablet by mouth Before breakfast and dinner. Lab Results   Component Value Date/Time    Hemoglobin A1c 7.5 (H) 02/22/2022 01:40 PM    Hemoglobin A1c 6.3 (H) 08/19/2021 10:33 AM    Hemoglobin A1c 8.1 (H) 10/16/2020 11:14 AM    Glucose 119 (H) 07/26/2021 01:13 AM    Glucose (POC) 90 07/22/2021 04:29 PM    Microalbumin/Creat ratio (mg/g creat) 18 08/19/2021 10:33 AM    Microalbumin,urine random 3.10 08/19/2021 10:33 AM    LDL, calculated 92.8 02/22/2022 01:40 PM    Creatinine 0.57 07/26/2021 01:13 AM     Lab Results   Component Value Date/Time    Microalbumin/Creat ratio (mg/g creat) 18 08/19/2021 10:33 AM    Microalbumin,urine random 3.10 08/19/2021 10:33 AM       Self DC cymbalta in October, then tried to restart in January and d/c again          Medications, allergies, PMH, PSH, SOCH, [address] reviewed and updated per routine protocol, see chart for review and changes if not noted here. ROS  A 12 point review of systems was negative except as noted here or in the HPI.     Objective:   Vital Signs: (As obtained by patient/caregiver at home)  Patient-Reported Vitals 10/11/2021   Patient-Reported Weight 311lb   Patient-Reported Height -   Patient-Reported Systolic  -   Patient-Reported LMP -        [INSTRUCTIONS:  \"[x]\" Indicates a positive item  \"[]\" Indicates a negative item  -- DELETE ALL ITEMS NOT EXAMINED]    Constitutional: [x] Appears well-developed and well-nourished [x] No apparent distress      [] Abnormal -     Mental status: [x] Alert and awake  [x] Oriented to person/place/time [x] Able to follow commands    [] Abnormal -     Eyes:   EOM    [x]  Normal    [] Abnormal -   Sclera  [x]  Normal    [] Abnormal -          Discharge [x]  None visible   [] Abnormal -     HENT: [x] Normocephalic, atraumatic  [] Abnormal -   [x] Mouth/Throat: Mucous membranes are moist    External Ears [x] Normal  [] Abnormal -    Neck: [x] No visualized mass [] Abnormal -     Pulmonary/Chest: [x] Respiratory effort normal   [x] No visualized signs of difficulty breathing or respiratory distress        [] Abnormal -      Musculoskeletal:   [] Normal gait with no signs of ataxia         [x] Normal range of motion of neck        [] Abnormal -     Neurological:        [x] No Facial Asymmetry (Cranial nerve 7 motor function) (limited exam due to video visit)          [x] No gaze palsy        [] Abnormal -          Skin:        [x] No significant exanthematous lesions or discoloration noted on facial skin         [] Abnormal -            Psychiatric:       [x] Normal Affect [] Abnormal -        [x] No Hallucinations    Other pertinent observable physical exam findings:seated, tearful at times    We discussed the expected course, resolution and complications of the diagnosis(es) in detail. Medication risks, benefits, costs, interactions, and alternatives were discussed as indicated. I advised her to contact the office if her condition worsens, changes or fails to improve as anticipated. She expressed understanding with the diagnosis(es) and plan. Radha Meier is a 29 y.o. female who was evaluated by a video visit encounter for concerns as above. Patient identification was verified prior to start of the visit. A caregiver was present when appropriate. Due to this being a TeleHealth encounter (During [facility] public health emergency), evaluation of the following organ systems was limited: Vitals/Constitutional/EENT/Resp/CV/GI//MS/Neuro/Skin/Heme-Lymph-Imm. Pursuant to the emergency declaration under the [address] [place] [address], [facility] waiver authority and the [employer] Act, this Virtual  Visit was conducted, with patient's (and/or legal guardian's) consent, to reduce the patient's risk of exposure to COVID-19 and provide necessary medical care.      Services were provided through a video synchronous discussion virtually to substitute for in-person clinic visit. Patient and provider were located at their individual homes. Rigo Caldera MD  [facility]  05/05/22 9:10 AM     Portions of this note may have been populated using smart dictation software and may have \"sounds-like\" errors present.

## 2022-08-05 LAB
ALBUMIN SERPL-MCNC: 3.7 G/DL (ref 3.9–5)
ALBUMIN/GLOB SERPL: 1.2 {RATIO} (ref 1.2–2.2)
ALP SERPL-CCNC: 84 IU/L (ref 44–121)
ALT SERPL-CCNC: 11 IU/L (ref 0–32)
AST SERPL-CCNC: 9 IU/L (ref 0–40)
BILIRUB SERPL-MCNC: 0.3 MG/DL (ref 0–1.2)
BUN SERPL-MCNC: 6 MG/DL (ref 6–20)
BUN/CREAT SERPL: 10 (ref 9–23)
CALCIUM SERPL-MCNC: 9.4 MG/DL (ref 8.7–10.2)
CHLORIDE SERPL-SCNC: 101 MMOL/L (ref 96–106)
CO2 SERPL-SCNC: 23 MMOL/L (ref 20–29)
CREAT SERPL-MCNC: 0.58 MG/DL (ref 0.57–1)
EGFR: 125 ML/MIN/1.73
EST. AVERAGE GLUCOSE BLD GHB EST-MCNC: 197 MG/DL
GLOBULIN SER CALC-MCNC: 3.2 G/DL (ref 1.5–4.5)
GLUCOSE SERPL-MCNC: 135 MG/DL (ref 65–99)
HBA1C MFR BLD: 8.5 % (ref 4.8–5.6)
POTASSIUM SERPL-SCNC: 4.5 MMOL/L (ref 3.5–5.2)
PROT SERPL-MCNC: 6.9 G/DL (ref 6–8.5)
SODIUM SERPL-SCNC: 137 MMOL/L (ref 134–144)

## 2022-08-11 ENCOUNTER — OFFICE VISIT (OUTPATIENT)
Dept: FAMILY MEDICINE CLINIC | Age: 32
End: 2022-08-11
Payer: COMMERCIAL

## 2022-08-11 VITALS
DIASTOLIC BLOOD PRESSURE: 82 MMHG | WEIGHT: 293 LBS | BODY MASS INDEX: 48.82 KG/M2 | HEART RATE: 88 BPM | HEIGHT: 65 IN | TEMPERATURE: 98 F | SYSTOLIC BLOOD PRESSURE: 109 MMHG

## 2022-08-11 DIAGNOSIS — E11.65 TYPE 2 DIABETES MELLITUS WITH HYPERGLYCEMIA, WITHOUT LONG-TERM CURRENT USE OF INSULIN (HCC): Primary | ICD-10-CM

## 2022-08-11 DIAGNOSIS — Z34.91 FIRST TRIMESTER PREGNANCY: ICD-10-CM

## 2022-08-11 DIAGNOSIS — O24.911 DIABETES MELLITUS AFFECTING PREGNANCY IN FIRST TRIMESTER: ICD-10-CM

## 2022-08-11 PROCEDURE — 99214 OFFICE O/P EST MOD 30 MIN: CPT | Performed by: FAMILY MEDICINE

## 2022-08-11 PROCEDURE — 3052F HG A1C>EQUAL 8.0%<EQUAL 9.0%: CPT | Performed by: FAMILY MEDICINE

## 2022-08-11 RX ORDER — BLOOD SUGAR DIAGNOSTIC
STRIP MISCELLANEOUS
Qty: 100 STRIP | Refills: 11 | Status: SHIPPED | OUTPATIENT
Start: 2022-08-11

## 2022-08-11 RX ORDER — LANCETS
EACH MISCELLANEOUS
Qty: 100 EACH | Refills: 11 | Status: SHIPPED | OUTPATIENT
Start: 2022-08-11

## 2022-08-11 RX ORDER — PROGESTERONE 200 MG/1
200 CAPSULE ORAL 2 TIMES DAILY
COMMUNITY
Start: 2022-08-05

## 2022-08-11 RX ORDER — BLOOD-GLUCOSE METER
EACH MISCELLANEOUS
Qty: 1 EACH | Refills: 0 | Status: SHIPPED | OUTPATIENT
Start: 2022-08-11

## 2022-08-11 RX ORDER — METFORMIN HYDROCHLORIDE 500 MG/1
1000 TABLET, EXTENDED RELEASE ORAL
Qty: 360 TABLET | Refills: 3 | Status: SHIPPED | OUTPATIENT
Start: 2022-08-11

## 2022-08-11 RX ORDER — BLOOD GLUCOSE CONTROL HIGH,LOW
EACH MISCELLANEOUS
Qty: 1 EACH | Refills: 2 | Status: SHIPPED | OUTPATIENT
Start: 2022-08-11

## 2022-08-11 NOTE — PATIENT INSTRUCTIONS
Week 1: 1 tab twice daily with meals-->if this isn't tolerated, drop to 1 tab at dinner and build from there    Week 2: 1 tab breakfast + 2 tabs dinner    Week 3: 2 tabs with both breakfast and dinner    For morning sickness:  B6+ doxylamine (unisom) (can make you sleepy)    Recommend labs in 3 months, ordered now  Recommend endo  Recommend follow up with your ob and your yaa

## 2022-08-11 NOTE — PROGRESS NOTES
Identified pt with two pt identifiers. Reviewed record in preparation for visit and have obtained necessary documentation. All patient medications has been reviewed. Chief Complaint   Patient presents with    Results     Additional information about chief complaint:    Visit Vitals  /82 (BP 1 Location: Left upper arm, BP Patient Position: Sitting, BP Cuff Size: Large adult)   Pulse 88   Temp 98 °F (36.7 °C) (Temporal)   Ht 5' 5\" (1.651 m)   Wt 314 lb (142.4 kg)   BMI 52.25 kg/m²       Health Maintenance Due   Topic    DTaP/Tdap/Td series (1 - Tdap)    Foot Exam Q1     Pneumococcal 0-64 years (2 - PCV)    Eye Exam Retinal or Dilated     COVID-19 Vaccine (3 - Booster for Pfizer series)    MICROALBUMIN Q1        1. Have you been to the ER, urgent care clinic since your last visit? Hospitalized since your last visit? No    2. Have you seen or consulted any other health care providers outside of the [address] [place] [address] since your last visit? Include any pap smears or colon screening.  No

## 2022-08-11 NOTE — PROGRESS NOTES
Family Medicine Follow-Up Progress Note  Patient: Giorgi Menezes  1990, 29 y.o., female  Encounter Date: 8/11/2022    ASSESSMENT & PLAN    ICD-10-CM ICD-9-CM    1. Type 2 diabetes mellitus with hyperglycemia, without long-term current use of insulin (HCC)  E11.65 250.00 CBC W/O DIFF     916.46 METABOLIC PANEL, COMPREHENSIVE      HEMOGLOBIN A1C WITH EAG      CBC W/O DIFF      METABOLIC PANEL, COMPREHENSIVE      HEMOGLOBIN A1C WITH EAG      REFERRAL TO ENDOCRINOLOGY      Blood Glucose Control High&Low (Accu-Chek Guide L1-L2 Ctrl Sol) soln      Blood-Glucose Meter (Accu-Chek Guide Glucose Meter) misc      glucose blood VI test strips (Accu-Chek Guide test strips) strip      lancets (Accu-Chek Fastclix Lancet Drum) misc      2. First trimester pregnancy  Z34.91 V22.2 REFERRAL TO ENDOCRINOLOGY      3. Diabetes mellitus affecting pregnancy in first trimester  O24.911 648.03 REFERRAL TO ENDOCRINOLOGY     250.00 Blood Glucose Control High&Low (Accu-Chek Guide L1-L2 Ctrl Sol) soln      Blood-Glucose Meter (Accu-Chek Guide Glucose Meter) misc      glucose blood VI test strips (Accu-Chek Guide test strips) strip      lancets (Accu-Chek Fastclix Lancet Drum) misc          Orders Placed This Encounter    CBC W/O DIFF     Standing Status:   Future     Number of Occurrences:   1     Standing Expiration Date:   9/53/2171    METABOLIC PANEL, COMPREHENSIVE     Standing Status:   Future     Number of Occurrences:   1     Standing Expiration Date:   8/11/2023    HEMOGLOBIN A1C WITH EAG     Standing Status:   Future     Number of Occurrences:   1     Standing Expiration Date:   8/11/2023    REFERRAL TO ENDOCRINOLOGY     Referral Priority:   Routine     Referral Type:   Consultation     Referral Reason:   Specialty Services Required     Number of Visits Requested:   1    progesterone (PROMETRIUM) 200 mg capsule     Sig: Take 200 mg by mouth two (2) times a day.     metFORMIN ER (GLUCOPHAGE XR) 500 mg tablet     Sig: Take 2 Tablets by mouth Before breakfast and dinner. Dispense:  360 Tablet     Refill:  3    Blood Glucose Control High&Low (Accu-Chek Guide L1-L2 Ctrl Sol) soln     Sig: Check blood sugar 1-3 times daily or as directed by JESUS Price 11.9     Dispense:  1 Each     Refill:  2    Blood-Glucose Meter (Accu-Chek Guide Glucose Meter) misc     Sig: Check blood sugar 1-3 times daily or as directed by JESUS Price 11.9     Dispense:  1 Each     Refill:  0    glucose blood VI test strips (Accu-Chek Guide test strips) strip     Sig: Check blood sugar 1-3 times daily or as directed by JESUS Price 11.9     Dispense:  100 Strip     Refill:  11    lancets (Accu-Chek Fastclix Lancet Drum) misc     Sig: Check blood sugar 1-3 times daily or as directed by JESUS Price 11.9     Dispense:  100 Each     Refill:  11       Patient Instructions   Week 1: 1 tab twice daily with meals-->if this isn't tolerated, drop to 1 tab at dinner and build from there    Week 2: 1 tab breakfast + 2 tabs dinner    Week 3: 2 tabs with both breakfast and dinner    For morning sickness:  B6+ doxylamine (unisom) (can make you sleepy)    Recommend labs in 3 months, ordered now  Recommend endo  Recommend follow up with your ob and your yaa    CHIEF COMPLAINT  Chief Complaint   Patient presents with    Results       Treva Rivera is a 29 y.o. female presenting today for follow up  Diabetes    Sugars controlled moderately  Hypoglycemia: none  Tolerating current treatment well  Current medications include has recently been noncompliant  Key Antihyperglycemic Medications               metFORMIN ER (GLUCOPHAGE XR) 500 mg tablet (Taking) Take 2 Tablets by mouth Before breakfast and dinner.             Lab Results   Component Value Date/Time    Hemoglobin A1c 8.5 (H) 08/04/2022 01:10 PM    Hemoglobin A1c 7.5 (H) 02/22/2022 01:40 PM    Hemoglobin A1c 6.3 (H) 08/19/2021 10:33 AM    Glucose 135 (H) 08/04/2022 01:10 PM    Glucose (POC) 90 07/22/2021 04:29 PM    Microalbumin/Creat ratio (mg/g creat) 18 08/19/2021 10:33 AM    Microalbumin,urine random 3.10 08/19/2021 10:33 AM    LDL, calculated 92.8 02/22/2022 01:40 PM    Creatinine 0.58 08/04/2022 01:10 PM     Lab Results   Component Value Date/Time    Microalbumin/Creat ratio (mg/g creat) 18 08/19/2021 10:33 AM    Microalbumin,urine random 3.10 08/19/2021 10:33 AM       Turns out she had self d/c her metformin and her cymbalta    She is having some emotional moments, which feels hormonal  Not feeling depressed , maybe some anxiety    Taking her prenatal  Getting nausea from time to time    Wt Readings from Last 3 Encounters:   08/11/22 314 lb (142.4 kg)   08/19/21 311 lb 9.6 oz (141.3 kg)   07/26/21 310 lb 12.8 oz (141 kg)         ROS  Review of Systems  A 12 point review of systems was negative except as noted here or in the HPI. OBJECTIVE  Visit Vitals  /82 (BP 1 Location: Left upper arm, BP Patient Position: Sitting, BP Cuff Size: Large adult)   Pulse 88   Temp 98 °F (36.7 °C) (Temporal)   Ht 5' 5\" (1.651 m)   Wt 314 lb (142.4 kg)   LMP 06/04/2022   BMI 52.25 kg/m²       Physical Exam  Vitals and nursing note reviewed. Constitutional:       General: She is not in acute distress. Appearance: Normal appearance. She is obese. She is not ill-appearing, toxic-appearing or diaphoretic. HENT:      Head: Normocephalic and atraumatic. Right Ear: External ear normal.      Left Ear: External ear normal.      Mouth/Throat:      Mouth: Mucous membranes are moist.   Eyes:      General: No scleral icterus. Right eye: No discharge. Left eye: No discharge. Comments: eom grossly intact   Neck:      Comments: No visible neck masses , ROM appears normal from visual inspection  Cardiovascular:      Rate and Rhythm: Normal rate and regular rhythm. Heart sounds: No murmur heard. No friction rub. No gallop. Pulmonary:      Effort: Pulmonary effort is normal. No respiratory distress. Breath sounds: No stridor.  No wheezing or rhonchi. Skin:     Comments: Visible skin is without jaundice, bruising, lesion, pallor, erythema or rash except as otherwise noted   Neurological:      General: No focal deficit present. Mental Status: She is alert and oriented to person, place, and time. Psychiatric:         Mood and Affect: Mood normal.         Behavior: Behavior normal.         Thought Content: Thought content normal.         Judgment: Judgment normal.       No results found for any visits on 22. HISTORICAL  Reviewed and updated today, and as noted below:    Past Medical History:   Diagnosis Date    Abnormal pap 2014+ 2015    LGSIL pap    Anemia NEC     Chronic pain     Depression     Diabetes ([facility] [address]     HPV vaccine counseling     gardasil series complete     HSV-2 infection     Missed  2021    Morbid jealousy ([facility] [address]     PCOS (polycystic ovarian syndrome)     Prediabetes     Sleep disorder     sleep apnea    Vitamin D deficiency      Past Surgical History:   Procedure Laterality Date    HX COLPOSCOPY  2014 + 2015    YAHAIRA I; 2015 neg path     Family History   Problem Relation Age of Onset    No Known Problems Mother     Heart Disease Father     Diabetes Father      Social History     Tobacco Use   Smoking Status Never   Smokeless Tobacco Never     Social History     Socioeconomic History    Marital status:    Tobacco Use    Smoking status: Never    Smokeless tobacco: Never   Vaping Use    Vaping Use: Never used   Substance and Sexual Activity    Alcohol use:  Yes     Alcohol/week: 0.0 standard drinks     Comment: socially    Sexual activity: Yes     Partners: Male     Birth control/protection: Condom   Social History Narrative    ** Merged History Encounter **          Allergies   Allergen Reactions    Aspirin Unknown (comments)     Bleeding disorder       LAB REVIEW  Lab Results   Component Value Date/Time    Sodium 137 2022 01:10 PM    Potassium 4.5 2022 01:10 PM    Chloride 101 08/04/2022 01:10 PM    CO2 23 08/04/2022 01:10 PM    Anion gap 6 07/26/2021 01:13 AM    Glucose 135 (H) 08/04/2022 01:10 PM    BUN 6 08/04/2022 01:10 PM    Creatinine 0.58 08/04/2022 01:10 PM    BUN/Creatinine ratio 10 08/04/2022 01:10 PM    GFR est AA >60 07/26/2021 01:13 AM    GFR est non-AA >60 07/26/2021 01:13 AM    Calcium 9.4 08/04/2022 01:10 PM    Bilirubin, total 0.3 08/04/2022 01:10 PM    Alk. phosphatase 84 08/04/2022 01:10 PM    Protein, total 6.9 08/04/2022 01:10 PM    Albumin 3.7 (L) 08/04/2022 01:10 PM    Globulin 4.0 07/26/2021 01:13 AM    A-G Ratio 1.2 08/04/2022 01:10 PM    ALT (SGPT) 11 08/04/2022 01:10 PM     Lab Results   Component Value Date/Time    WBC 12.5 (H) 02/22/2022 01:40 PM    Hemoglobin (POC) 12.5 07/22/2021 01:46 PM    HGB 11.4 (L) 02/22/2022 01:40 PM    HCT 37.4 02/22/2022 01:40 PM    PLATELET 735 (H) 21/49/0507 01:40 PM    MCV 86.4 02/22/2022 01:40 PM     Lab Results   Component Value Date/Time    Hemoglobin A1c 8.5 (H) 08/04/2022 01:10 PM    Hemoglobin A1c (POC) 6.0 05/14/2021 09:48 AM     Lab Results   Component Value Date/Time    Cholesterol, total 159 02/22/2022 01:40 PM    HDL Cholesterol 51 02/22/2022 01:40 PM    LDL, calculated 92.8 02/22/2022 01:40 PM    VLDL, calculated 15.2 02/22/2022 01:40 PM    Triglyceride 76 02/22/2022 01:40 PM    CHOL/HDL Ratio 3.1 02/22/2022 01:40 PM           Mirlande Tamez MD  [facility]  08/11/22 2:53 PM    Portions of this note may have been populated using smart dictation software and may have \"sounds-like\" errors present. Pt was counseled on risks, benefits and alternatives of treatment options. All questions were asked and answered and the patient was agreeable with the treatment plan as outlined.

## 2022-08-24 LAB
CHLAMYDIA, EXTERNAL: NEGATIVE
HBSAG, EXTERNAL: NORMAL
HEPATITIS C AB,   EXT: NEGATIVE
HIV, EXTERNAL: NON REACTIVE
N. GONORRHEA, EXTERNAL: NEGATIVE
RUBELLA, EXTERNAL: NORMAL
TYPE, ABO & RH, EXTERNAL: NORMAL

## 2022-10-17 ENCOUNTER — APPOINTMENT (OUTPATIENT)
Dept: GENERAL RADIOLOGY | Age: 32
End: 2022-10-17
Attending: EMERGENCY MEDICINE
Payer: COMMERCIAL

## 2022-10-17 ENCOUNTER — HOSPITAL ENCOUNTER (EMERGENCY)
Age: 32
Discharge: HOME | End: 2022-10-17
Attending: EMERGENCY MEDICINE
Payer: COMMERCIAL

## 2022-10-17 VITALS
RESPIRATION RATE: 18 BRPM | TEMPERATURE: 98 F | SYSTOLIC BLOOD PRESSURE: 117 MMHG | DIASTOLIC BLOOD PRESSURE: 75 MMHG | OXYGEN SATURATION: 99 % | HEART RATE: 97 BPM

## 2022-10-17 DIAGNOSIS — R07.9 CHEST PAIN, UNSPECIFIED TYPE: Primary | ICD-10-CM

## 2022-10-17 LAB
ALBUMIN SERPL-MCNC: 2.7 G/DL (ref 3.5–5)
ALBUMIN/GLOB SERPL: 0.6 {RATIO} (ref 1.1–2.2)
ALP SERPL-CCNC: 79 U/L (ref 45–117)
ALT SERPL-CCNC: 24 U/L (ref 12–78)
ANION GAP SERPL CALC-SCNC: 8 MMOL/L (ref 5–15)
APPEARANCE UR: CLEAR
AST SERPL-CCNC: 11 U/L (ref 15–37)
ATRIAL RATE: 96 BPM
BACTERIA URNS QL MICRO: ABNORMAL /HPF
BASOPHILS # BLD: 0 K/UL (ref 0–0.1)
BASOPHILS NFR BLD: 0 % (ref 0–1)
BILIRUB SERPL-MCNC: 0.3 MG/DL (ref 0.2–1)
BILIRUB UR QL: NEGATIVE
BUN SERPL-MCNC: 4 MG/DL (ref 6–20)
BUN/CREAT SERPL: 6 (ref 12–20)
CALCIUM SERPL-MCNC: 9.8 MG/DL (ref 8.5–10.1)
CALCULATED R AXIS, ECG10: 78 DEGREES
CALCULATED T AXIS, ECG11: 0 DEGREES
CHLORIDE SERPL-SCNC: 107 MMOL/L (ref 97–108)
CO2 SERPL-SCNC: 22 MMOL/L (ref 21–32)
COLOR UR: ABNORMAL
COMMENT, HOLDF: NORMAL
CREAT SERPL-MCNC: 0.66 MG/DL (ref 0.55–1.02)
DIAGNOSIS, 93000: NORMAL
DIFFERENTIAL METHOD BLD: ABNORMAL
EOSINOPHIL # BLD: 0.1 K/UL (ref 0–0.4)
EOSINOPHIL NFR BLD: 1 % (ref 0–7)
EPITH CASTS URNS QL MICRO: ABNORMAL /LPF
ERYTHROCYTE [DISTWIDTH] IN BLOOD BY AUTOMATED COUNT: 14.2 % (ref 11.5–14.5)
GLOBULIN SER CALC-MCNC: 4.5 G/DL (ref 2–4)
GLUCOSE SERPL-MCNC: 82 MG/DL (ref 65–100)
GLUCOSE UR STRIP.AUTO-MCNC: NEGATIVE MG/DL
HCT VFR BLD AUTO: 33.3 % (ref 35–47)
HGB BLD-MCNC: 11.5 G/DL (ref 11.5–16)
HGB UR QL STRIP: NEGATIVE
IMM GRANULOCYTES # BLD AUTO: 0 K/UL (ref 0–0.04)
IMM GRANULOCYTES NFR BLD AUTO: 0 % (ref 0–0.5)
KETONES UR QL STRIP.AUTO: NEGATIVE MG/DL
LEUKOCYTE ESTERASE UR QL STRIP.AUTO: ABNORMAL
LYMPHOCYTES # BLD: 3.2 K/UL (ref 0.8–3.5)
LYMPHOCYTES NFR BLD: 26 % (ref 12–49)
MCH RBC QN AUTO: 29.3 PG (ref 26–34)
MCHC RBC AUTO-ENTMCNC: 34.5 G/DL (ref 30–36.5)
MCV RBC AUTO: 84.9 FL (ref 80–99)
MONOCYTES # BLD: 0.6 K/UL (ref 0–1)
MONOCYTES NFR BLD: 5 % (ref 5–13)
NEUTS SEG # BLD: 8.5 K/UL (ref 1.8–8)
NEUTS SEG NFR BLD: 68 % (ref 32–75)
NITRITE UR QL STRIP.AUTO: NEGATIVE
NRBC # BLD: 0 K/UL (ref 0–0.01)
NRBC BLD-RTO: 0 PER 100 WBC
P-R INTERVAL, ECG05: 150 MS
PH UR STRIP: 7 [PH] (ref 5–8)
PLATELET # BLD AUTO: 395 K/UL (ref 150–400)
PMV BLD AUTO: 9.5 FL (ref 8.9–12.9)
POTASSIUM SERPL-SCNC: 3.6 MMOL/L (ref 3.5–5.1)
PROT SERPL-MCNC: 7.2 G/DL (ref 6.4–8.2)
PROT UR STRIP-MCNC: NEGATIVE MG/DL
Q-T INTERVAL, ECG07: 344 MS
QRS DURATION, ECG06: 68 MS
QTC CALCULATION (BEZET), ECG08: 434 MS
RBC # BLD AUTO: 3.92 M/UL (ref 3.8–5.2)
RBC #/AREA URNS HPF: ABNORMAL /HPF (ref 0–5)
SAMPLES BEING HELD,HOLD: NORMAL
SODIUM SERPL-SCNC: 137 MMOL/L (ref 136–145)
SP GR UR REFRACTOMETRY: <1.005 (ref 1–1.03)
TROPONIN-HIGH SENSITIVITY: 8 NG/L (ref 0–51)
TROPONIN-HIGH SENSITIVITY: 8 NG/L (ref 0–51)
UR CULT HOLD, URHOLD: NORMAL
UROBILINOGEN UR QL STRIP.AUTO: 0.2 EU/DL (ref 0.2–1)
VENTRICULAR RATE, ECG03: 96 BPM
WBC # BLD AUTO: 12.4 K/UL (ref 3.6–11)
WBC URNS QL MICRO: ABNORMAL /HPF (ref 0–4)

## 2022-10-17 PROCEDURE — 81001 URINALYSIS AUTO W/SCOPE: CPT

## 2022-10-17 PROCEDURE — 36415 COLL VENOUS BLD VENIPUNCTURE: CPT

## 2022-10-17 PROCEDURE — 74011250636 HC RX REV CODE- 250/636: Performed by: EMERGENCY MEDICINE

## 2022-10-17 PROCEDURE — 71045 X-RAY EXAM CHEST 1 VIEW: CPT

## 2022-10-17 PROCEDURE — 85025 COMPLETE CBC W/AUTO DIFF WBC: CPT

## 2022-10-17 PROCEDURE — 99285 EMERGENCY DEPT VISIT HI MDM: CPT

## 2022-10-17 PROCEDURE — 84484 ASSAY OF TROPONIN QUANT: CPT

## 2022-10-17 PROCEDURE — 93005 ELECTROCARDIOGRAM TRACING: CPT

## 2022-10-17 PROCEDURE — 80053 COMPREHEN METABOLIC PANEL: CPT

## 2022-10-17 PROCEDURE — 74011250637 HC RX REV CODE- 250/637: Performed by: EMERGENCY MEDICINE

## 2022-10-17 RX ORDER — AMOXICILLIN 500 MG/1
500 CAPSULE ORAL
Status: COMPLETED | OUTPATIENT
Start: 2022-10-17 | End: 2022-10-17

## 2022-10-17 RX ORDER — AMOXICILLIN 875 MG/1
875 TABLET, FILM COATED ORAL 2 TIMES DAILY
Qty: 20 TABLET | Refills: 0 | Status: SHIPPED | OUTPATIENT
Start: 2022-10-17 | End: 2022-10-27

## 2022-10-17 RX ADMIN — AMOXICILLIN 500 MG: 500 CAPSULE ORAL at 20:23

## 2022-10-17 RX ADMIN — SODIUM CHLORIDE 1000 ML: 900 INJECTION, SOLUTION INTRAVENOUS at 16:30

## 2022-10-17 NOTE — ED PROVIDER NOTES
42-year-old female G3, P0 at approximately 17 weeks with a history of diabetes, depression, obesity, PCOS presents to the emergency department with concern for chest pain. She was at her back doctor today when she had sudden onset of chest tightness in the middle of her chest without radiation. She denies shortness of breath to triage although she tells me she feels some shortness of breath. She appears anxious on exam but tells me this is different than her normal panic attacks have been. She tells me she is being treated with aspirin for preeclampsia. The history is provided by the patient and medical records. Chest Pain (Angina)   This is a new problem. The current episode started 1 to 2 hours ago. The problem has not changed since onset. The problem occurs constantly. The pain is present in the substernal region. The pain is moderate. The quality of the pain is described as tightness. The pain does not radiate. Associated symptoms include palpitations and shortness of breath. Pertinent negatives include no abdominal pain, no cough, no fever, no headaches, no nausea, no vomiting and no weakness. Pertinent negatives include no cardiac stents.      Past Medical History:   Diagnosis Date    Abnormal pap 2014+ 2015    LGSIL pap    Anemia NEC     Chronic pain     Depression     Diabetes ([address]     HPV vaccine counseling     gardasil series complete     HSV-2 infection     Missed  2021    Morbid jealousy ([address]     PCOS (polycystic ovarian syndrome)     Prediabetes     Sleep disorder     sleep apnea    Vitamin D deficiency        Past Surgical History:   Procedure Laterality Date    HX COLPOSCOPY  2014 + 2015    YAHAIRA I; 2015 neg path         Family History:   Problem Relation Age of Onset    No Known Problems Mother     Heart Disease Father     Diabetes Father        Social History     Socioeconomic History    Marital status:      Spouse name: Not on file    Number of children: Not on file    Years of education: Not on file    Highest education level: Not on file   Occupational History    Not on file   Tobacco Use    Smoking status: Never    Smokeless tobacco: Never   Vaping Use    Vaping Use: Never used   Substance and Sexual Activity    Alcohol use: Yes     Alcohol/week: 0.0 standard drinks     Comment: socially    Drug use: Not on file    Sexual activity: Yes     Partners: Male     Birth control/protection: Condom   Other Topics Concern    Not on file   Social History Narrative    ** Merged History Encounter **          Social Determinants of Health     Financial Resource Strain: Not on file   Food Insecurity: Not on file   Transportation Needs: Not on file   Physical Activity: Not on file   Stress: Not on file   Social Connections: Not on file   Intimate Partner Violence: Not on file   Housing Stability: Not on file         ALLERGIES: Aspirin    Review of Systems   Constitutional:  Negative for fatigue and fever. HENT:  Negative for sneezing and sore throat. Respiratory:  Positive for shortness of breath. Negative for cough. Cardiovascular:  Positive for chest pain and palpitations. Negative for leg swelling. Gastrointestinal:  Negative for abdominal pain, diarrhea, nausea and vomiting. Genitourinary:  Negative for difficulty urinating and dysuria. Musculoskeletal:  Negative for arthralgias and myalgias. Skin:  Negative for color change and rash. Neurological:  Negative for weakness and headaches. Psychiatric/Behavioral:  Negative for agitation and behavioral problems. Vitals:    10/17/22 1600   BP: 117/75   Pulse: 97   Resp: 18   Temp: 98 °F (36.7 °C)   SpO2: 99%            Physical Exam  Vitals and nursing note reviewed. Constitutional:       General: She is not in acute distress. Appearance: Normal appearance. She is well-developed. She is obese. She is not ill-appearing, toxic-appearing or diaphoretic.       Comments: Appears anxious during my evaluation, hyperventilating   HENT:      Head: Normocephalic and atraumatic. Nose: Nose normal.      Mouth/Throat:      Mouth: Mucous membranes are moist.      Pharynx: Oropharynx is clear. Eyes:      Extraocular Movements: Extraocular movements intact. Conjunctiva/sclera: Conjunctivae normal.      Pupils: Pupils are equal, round, and reactive to light. Cardiovascular:      Rate and Rhythm: Normal rate and regular rhythm. Pulses: Normal pulses. Heart sounds: Normal heart sounds. Pulmonary:      Effort: Pulmonary effort is normal. No respiratory distress. Breath sounds: Normal breath sounds. No wheezing. Chest:      Chest wall: No tenderness. Abdominal:      General: Abdomen is flat. There is no distension. Palpations: Abdomen is soft. Tenderness: There is no abdominal tenderness. There is no guarding or rebound. Musculoskeletal:         General: No swelling, tenderness, deformity or signs of injury. Normal range of motion. Cervical back: Normal range of motion and neck supple. No rigidity. No muscular tenderness. Right lower leg: No edema. Left lower leg: No edema. Skin:     General: Skin is warm and dry. Capillary Refill: Capillary refill takes less than 2 seconds. Neurological:      General: No focal deficit present. Mental Status: She is alert and oriented to person, place, and time. Psychiatric:         Mood and Affect: Mood is anxious. Behavior: Behavior normal.        MDM  Number of Diagnoses or Management Options  Diagnosis management comments: Pain35-year-old female presents as above with shortness of breath. No fevers but she does endorse some cough. Chest x-ray shows nonspecific findings, potentially due to infection. At this point plan to treat with antibiotics for potentially developing pneumonia. She is already on aspirin as anti-inflammatory per her obstetrician for preeclampsia. She can also take Tylenol for pain.   We discussed specific concern for potential PE and that she should return immediately for worsening pain or shortness of breath. Additionally she should return if she is not improving in 48 hours for reevaluation. Amount and/or Complexity of Data Reviewed  Clinical lab tests: reviewed  Tests in the radiology section of CPT®: reviewed  Tests in the medicine section of CPT®: reviewed      ED Course as of 10/17/22 2014   Mon Oct 17, 2022   1625   ED EKG interpretation:  Rhythm: normal sinus rhythm. Rate (approx.): 96. Axis: normal.  ST segment:  No concerning ST elevations or depressions. This EKG was interpreted by Madhu Keane MD,ED Provider.  [JM]      ED Course User Index  [JM] Nuria Bliss MD       Procedures

## 2022-10-17 NOTE — ED TRIAGE NOTES
Triage: Pt arrives from a PCP appointment with CC of chest pain. Pt reports the pain started suddenly today. It is worsened by sitting upright. Denies SOB. Pt is 17 weeks pregnant. This is her 3rd pregnancy. Both previous pregnancies resulted in miscarriage. Pt reports hx of anxiety but this is different.

## 2022-10-18 NOTE — DISCHARGE INSTRUCTIONS
Thank you for allowing us to provide you with medical care today. We realize that you have many choices for your emergency care needs. We thank you for choosing [facility]. Please choose us in the future for any continued health care needs. We hope we addressed all of your medical concerns. We strive to provide excellent quality care in the Emergency Department. Anything less than excellent does not meet our expectations. The exam and treatment you received in the Emergency Department were for an emergent problem and are not intended as complete care. It is important that you follow up with a doctor, nurse practitioner, or physician's assistant for ongoing care. If your symptoms worsen or you do not improve as expected and you are unable to reach your usual health care provider, you should return to the Emergency Department. We are available 24 hours a day. Take this sheet with you when you go to your follow-up visit. If you have any problem arranging the follow-up visit, contact the Emergency Department immediately. Make an appointment your family doctor for follow up of this visit. Return to the ER if you are unable to be seen in a timely manner.

## 2022-10-25 ENCOUNTER — APPOINTMENT (OUTPATIENT)
Dept: CT IMAGING | Age: 32
End: 2022-10-25
Attending: EMERGENCY MEDICINE
Payer: COMMERCIAL

## 2022-10-25 ENCOUNTER — HOSPITAL ENCOUNTER (EMERGENCY)
Age: 32
Discharge: HOME | End: 2022-10-25
Attending: EMERGENCY MEDICINE
Payer: COMMERCIAL

## 2022-10-25 VITALS
DIASTOLIC BLOOD PRESSURE: 81 MMHG | OXYGEN SATURATION: 99 % | HEART RATE: 93 BPM | SYSTOLIC BLOOD PRESSURE: 134 MMHG | TEMPERATURE: 98.5 F | RESPIRATION RATE: 16 BRPM

## 2022-10-25 DIAGNOSIS — R06.00 DYSPNEA, UNSPECIFIED TYPE: ICD-10-CM

## 2022-10-25 DIAGNOSIS — I51.7 MILD CARDIOMEGALY: ICD-10-CM

## 2022-10-25 DIAGNOSIS — R07.89 ATYPICAL CHEST PAIN: Primary | ICD-10-CM

## 2022-10-25 DIAGNOSIS — Z34.92 PREGNANT AND NOT YET DELIVERED IN SECOND TRIMESTER: ICD-10-CM

## 2022-10-25 LAB
ALBUMIN SERPL-MCNC: 2.6 G/DL (ref 3.5–5)
ALBUMIN/GLOB SERPL: 0.6 {RATIO} (ref 1.1–2.2)
ALP SERPL-CCNC: 80 U/L (ref 45–117)
ALT SERPL-CCNC: 22 U/L (ref 12–78)
ANION GAP SERPL CALC-SCNC: 9 MMOL/L (ref 5–15)
AST SERPL-CCNC: 21 U/L (ref 15–37)
BASOPHILS # BLD: 0 K/UL (ref 0–0.1)
BASOPHILS NFR BLD: 0 % (ref 0–1)
BILIRUB SERPL-MCNC: 0.4 MG/DL (ref 0.2–1)
BUN SERPL-MCNC: 3 MG/DL (ref 6–20)
BUN/CREAT SERPL: 5 (ref 12–20)
CALCIUM SERPL-MCNC: 9.2 MG/DL (ref 8.5–10.1)
CHLORIDE SERPL-SCNC: 106 MMOL/L (ref 97–108)
CO2 SERPL-SCNC: 22 MMOL/L (ref 21–32)
COMMENT, HOLDF: NORMAL
CREAT SERPL-MCNC: 0.6 MG/DL (ref 0.55–1.02)
DIFFERENTIAL METHOD BLD: ABNORMAL
EOSINOPHIL # BLD: 0.1 K/UL (ref 0–0.4)
EOSINOPHIL NFR BLD: 1 % (ref 0–7)
ERYTHROCYTE [DISTWIDTH] IN BLOOD BY AUTOMATED COUNT: 14.2 % (ref 11.5–14.5)
GLOBULIN SER CALC-MCNC: 4.4 G/DL (ref 2–4)
GLUCOSE SERPL-MCNC: 138 MG/DL (ref 65–100)
HCT VFR BLD AUTO: 32.9 % (ref 35–47)
HGB BLD-MCNC: 11.1 G/DL (ref 11.5–16)
IMM GRANULOCYTES # BLD AUTO: 0 K/UL (ref 0–0.04)
IMM GRANULOCYTES NFR BLD AUTO: 0 % (ref 0–0.5)
LYMPHOCYTES # BLD: 2.7 K/UL (ref 0.8–3.5)
LYMPHOCYTES NFR BLD: 23 % (ref 12–49)
MCH RBC QN AUTO: 28.9 PG (ref 26–34)
MCHC RBC AUTO-ENTMCNC: 33.7 G/DL (ref 30–36.5)
MCV RBC AUTO: 85.7 FL (ref 80–99)
MONOCYTES # BLD: 0.3 K/UL (ref 0–1)
MONOCYTES NFR BLD: 3 % (ref 5–13)
NEUTS SEG # BLD: 8.4 K/UL (ref 1.8–8)
NEUTS SEG NFR BLD: 73 % (ref 32–75)
NRBC # BLD: 0 K/UL (ref 0–0.01)
NRBC BLD-RTO: 0 PER 100 WBC
PLATELET # BLD AUTO: 396 K/UL (ref 150–400)
PMV BLD AUTO: 9.5 FL (ref 8.9–12.9)
POTASSIUM SERPL-SCNC: 3.9 MMOL/L (ref 3.5–5.1)
PROT SERPL-MCNC: 7 G/DL (ref 6.4–8.2)
RBC # BLD AUTO: 3.84 M/UL (ref 3.8–5.2)
SAMPLES BEING HELD,HOLD: NORMAL
SODIUM SERPL-SCNC: 137 MMOL/L (ref 136–145)
TROPONIN-HIGH SENSITIVITY: 8 NG/L (ref 0–51)
WBC # BLD AUTO: 11.5 K/UL (ref 3.6–11)

## 2022-10-25 PROCEDURE — 36415 COLL VENOUS BLD VENIPUNCTURE: CPT

## 2022-10-25 PROCEDURE — 93005 ELECTROCARDIOGRAM TRACING: CPT

## 2022-10-25 PROCEDURE — 99285 EMERGENCY DEPT VISIT HI MDM: CPT

## 2022-10-25 PROCEDURE — 80053 COMPREHEN METABOLIC PANEL: CPT

## 2022-10-25 PROCEDURE — 74011000636 HC RX REV CODE- 636: Performed by: EMERGENCY MEDICINE

## 2022-10-25 PROCEDURE — 71275 CT ANGIOGRAPHY CHEST: CPT

## 2022-10-25 PROCEDURE — 84484 ASSAY OF TROPONIN QUANT: CPT

## 2022-10-25 PROCEDURE — 85025 COMPLETE CBC W/AUTO DIFF WBC: CPT

## 2022-10-25 RX ADMIN — IOPAMIDOL 100 ML: 755 INJECTION, SOLUTION INTRAVENOUS at 14:15

## 2022-10-25 NOTE — ED TRIAGE NOTES
Pt to ED sent by OB for worsening SOB, CP. Pt dx with pneumonia and currently taking antibiotics. Currently wearing Holter monitor by cardiology. Sx including palpations, chest pain, sob, dizziness. Neg DVT study yesterday     Pt is 18 weeks pregnant with normal OB care. .  Denies abd pain, vaginal bleeding

## 2022-10-25 NOTE — ED PROVIDER NOTES
Please note that this dictation was completed with [employer], the computer voice recognition software. Quite often unanticipated grammatical, syntax, homophones, and other interpretive errors are inadvertently transcribed by the computer software. Please disregard these errors. Please excuse any errors that have escaped final proofreading. Patient is a 16-year-old female with past medical history as below presenting to ED for evaluation of chest pain and shortness of breath with onset 8 days ago. Patient is currently 18 weeks pregnant, , denies any complications thus far in this pregnancy. States that she was seen at [facility] ED for this upon onset and was diagnosed with possible pneumonia and started on amoxicillin. She has since followed up with her cardiologist and her OB/GYN who does not feel like her symptoms are consistent with pneumonia and they are concerned for a pulmonary embolism. She spoke with both her OB and her cardiologist today who recommended she come to the ER for a CT scan. She also recently had an Holter monitor placed by cardiology and is scheduled for an outpatient echocardiogram.  She also had a negative DVT study yesterday of both of her lower extremities. Denies history of blood clot. Patient reports she has been having normal fetal movement, denies abdominal pain or vaginal bleeding.        Past Medical History:   Diagnosis Date    Abnormal pap 2014+ 2015    LGSIL pap    Anemia NEC     Chronic pain     Depression     Diabetes ([address]     HPV vaccine counseling     gardasil series complete     HSV-2 infection     Missed  2021    Morbid jealousy ([address]     PCOS (polycystic ovarian syndrome)     Prediabetes     Sleep disorder     sleep apnea    Vitamin D deficiency        Past Surgical History:   Procedure Laterality Date    HX COLPOSCOPY  2014 + 2015    YAHAIRA I; 2015 neg path         Family History:   Problem Relation Age of Onset    No Known Problems Mother     Heart Disease Father     Diabetes Father        Social History     Socioeconomic History    Marital status:      Spouse name: Not on file    Number of children: Not on file    Years of education: Not on file    Highest education level: Not on file   Occupational History    Not on file   Tobacco Use    Smoking status: Never    Smokeless tobacco: Never   Vaping Use    Vaping Use: Never used   Substance and Sexual Activity    Alcohol use: Yes     Alcohol/week: 0.0 standard drinks     Comment: socially    Drug use: Not on file    Sexual activity: Yes     Partners: Male     Birth control/protection: Condom   Other Topics Concern    Not on file   Social History Narrative    ** Merged History Encounter **          Social Determinants of Health     Financial Resource Strain: Not on file   Food Insecurity: Not on file   Transportation Needs: Not on file   Physical Activity: Not on file   Stress: Not on file   Social Connections: Not on file   Intimate Partner Violence: Not on file   Housing Stability: Not on file         ALLERGIES: Aspirin    Review of Systems   Constitutional:  Negative for chills and fever. HENT:  Negative for congestion, ear pain and sore throat. Eyes:  Negative for visual disturbance. Respiratory:  Positive for shortness of breath. Negative for cough. Cardiovascular:  Positive for chest pain. Gastrointestinal:  Negative for abdominal pain, diarrhea, nausea and vomiting. Genitourinary:  Negative for dysuria and flank pain. Musculoskeletal:  Negative for back pain. Skin:  Negative for color change. Neurological:  Negative for dizziness and headaches. Psychiatric/Behavioral:  Negative for confusion. Vitals:    10/25/22 1256   BP: 134/81   Pulse: 93   Resp: 16   Temp: 98.5 °F (36.9 °C)   SpO2: 99%            Physical Exam  Vitals and nursing note reviewed. Constitutional:       General: She is not in acute distress. Appearance: Normal appearance.  She is not ill-appearing. HENT:      Head: Normocephalic and atraumatic. Eyes:      General: Vision grossly intact. Extraocular Movements: Extraocular movements intact. Conjunctiva/sclera: Conjunctivae normal.   Neck:      Trachea: Phonation normal.   Cardiovascular:      Rate and Rhythm: Normal rate and regular rhythm. Heart sounds: Normal heart sounds. Pulmonary:      Effort: Pulmonary effort is normal.      Breath sounds: Normal breath sounds and air entry. No decreased breath sounds, wheezing, rhonchi or rales. Abdominal:      Palpations: Abdomen is soft. Tenderness: There is no abdominal tenderness. Musculoskeletal:         General: Normal range of motion. Skin:     General: Skin is warm and dry. Neurological:      General: No focal deficit present. Mental Status: She is alert and oriented to person, place, and time. Psychiatric:         Behavior: Behavior normal.        MDM  Number of Diagnoses or Management Options  Atypical chest pain  Dyspnea, unspecified type  Mild cardiomegaly  Pregnant and not yet delivered in second trimester  Diagnosis management comments: Patient is alert, afebrile, vital stable. Oxygen saturation 99% on room air with unlabored breathing. Presents with 1 week of chest pain and dyspnea with exertion, has Holter monitor placed and scheduled outpatient echocardiogram per cardiology. 18 weeks pregnant. Referred here by cardiology and OB for PE study. Negative outpatient DVT study yesterday. Heart, lung exam within normal limits. No extremity edema. EKG without acute ischemic changes or ectopy. Troponin x1 within normal limits. Heart score low risk category. Discussed at length with patient and her  that from my standpoint she is at a fairly low risk for PE with normal labs and ultrasound yesterday, however cannot fully rule out without a scan.   Discussed risk and benefits of radiation, and patient wishes to proceed with CTA which was negative for pulmonary embolism or pneumonia. Did show mild cardiomegaly but otherwise is unremarkable. Discussed findings with patient and she will follow-up with her OB/GYN and cardiology as scheduled. Discharge from ED in stable condition, return precautions outlined. Amount and/or Complexity of Data Reviewed  Decide to obtain previous medical records or to obtain history from someone other than the patient: yes      ED Course as of 10/25/22 1503   Tue Oct 25, 2022   1312   ED EKG interpretation:  Rhythm: normal sinus rhythm. Rate (approx.): 82. Axis: normal.  ST segment:  No concerning ST elevations or depressions. This EKG was interpreted by Keanu Heath MD,ED Provider. [JM]   2151 CTA CHEST W OR W WO CONT  IMPRESSION  There is no pulmonary embolism. There is no aortic aneurysm or dissection. No acute intrathoracic process is identified. Incidental findings are as  described above. [EP]   6739 Troponin-High Sensitivity: 8 [EP]      ED Course User Index  [EP] BRITTNEY Collazo  [JM] Naomy Clarke MD     3:03 PM  Pt has been reevaluated. There are no new complaints, changes, or physical findings at this time. All results have been reviewed with patient and/or family. Medications have been reviewed w/ pt and/or family. Pt and/or family's questions have been answered. Pt and/or family expressed good understanding of the dx/tx/rx and is in agreement with plan of care. Pt instructed and agreed to f/u w/ cardiology, OBGYN and to return to ED upon further deterioration. Return precautions outlined. All questions answered at this time. Pt is stable and ready for discharge. IMPRESSION:  1. Atypical chest pain    2. Dyspnea, unspecified type    3. Pregnant and not yet delivered in second trimester    4. Mild cardiomegaly        PLAN:  1. Discharge Medication List as of 10/25/2022  2:49 PM        2.    Follow-up Information       Follow up With Specialties Details Why Contact Dinora Shelley MD Obstetrics & Gynecology, Gynecology, Obstetrics Schedule an appointment as soon as possible for a visit   [address]  [address]  [address] [place] [address]  [phone]      Your Cardiologist  Schedule an appointment as soon as possible for a visit                 Return to ED if worse       Procedures

## 2022-10-26 LAB
ATRIAL RATE: 82 BPM
CALCULATED R AXIS, ECG10: 7 DEGREES
CALCULATED T AXIS, ECG11: 20 DEGREES
DIAGNOSIS, 93000: NORMAL
P-R INTERVAL, ECG05: 138 MS
Q-T INTERVAL, ECG07: 360 MS
QRS DURATION, ECG06: 72 MS
QTC CALCULATION (BEZET), ECG08: 420 MS
VENTRICULAR RATE, ECG03: 82 BPM

## 2022-12-10 ENCOUNTER — HOSPITAL ENCOUNTER (EMERGENCY)
Age: 32
Discharge: HOME | End: 2022-12-10
Attending: OBSTETRICS & GYNECOLOGY | Admitting: OBSTETRICS & GYNECOLOGY
Payer: COMMERCIAL

## 2022-12-10 VITALS
SYSTOLIC BLOOD PRESSURE: 126 MMHG | TEMPERATURE: 97.9 F | BODY MASS INDEX: 47.32 KG/M2 | RESPIRATION RATE: 18 BRPM | DIASTOLIC BLOOD PRESSURE: 64 MMHG | WEIGHT: 284 LBS | HEIGHT: 65 IN | HEART RATE: 78 BPM

## 2022-12-10 PROBLEM — O99.212 OBESITY AFFECTING PREGNANCY IN SECOND TRIMESTER: Status: ACTIVE | Noted: 2022-12-10

## 2022-12-10 PROBLEM — O24.112 PREGNANCY COMPLICATED BY PRE-EXISTING TYPE 2 DIABETES IN SECOND TRIMESTER: Status: ACTIVE | Noted: 2022-12-10

## 2022-12-10 PROBLEM — O23.42: Status: ACTIVE | Noted: 2022-12-10

## 2022-12-10 PROBLEM — O35.06X1: Status: ACTIVE | Noted: 2022-12-10

## 2022-12-10 LAB
APPEARANCE UR: ABNORMAL
BACTERIA URNS QL MICRO: ABNORMAL /HPF
BILIRUB UR QL: NEGATIVE
COLOR UR: ABNORMAL
EPITH CASTS URNS QL MICRO: ABNORMAL /LPF
FIBRONECTIN FETAL VAG QL: NEGATIVE
GLUCOSE UR STRIP.AUTO-MCNC: NEGATIVE MG/DL
HGB UR QL STRIP: NEGATIVE
KETONES UR QL STRIP.AUTO: 40 MG/DL
LEUKOCYTE ESTERASE UR QL STRIP.AUTO: ABNORMAL
NITRITE UR QL STRIP.AUTO: NEGATIVE
PH UR STRIP: 6.5 [PH] (ref 5–8)
PROT UR STRIP-MCNC: NEGATIVE MG/DL
RBC #/AREA URNS HPF: ABNORMAL /HPF (ref 0–5)
SP GR UR REFRACTOMETRY: 1.01 (ref 1–1.03)
UA: UC IF INDICATED,UAUC: ABNORMAL
UROBILINOGEN UR QL STRIP.AUTO: 1 EU/DL (ref 0.2–1)
WBC URNS QL MICRO: ABNORMAL /HPF (ref 0–4)

## 2022-12-10 PROCEDURE — 75810000275 HC EMERGENCY DEPT VISIT NO LEVEL OF CARE

## 2022-12-10 PROCEDURE — 74011250637 HC RX REV CODE- 250/637: Performed by: OBSTETRICS & GYNECOLOGY

## 2022-12-10 PROCEDURE — 99284 EMERGENCY DEPT VISIT MOD MDM: CPT

## 2022-12-10 PROCEDURE — 81001 URINALYSIS AUTO W/SCOPE: CPT

## 2022-12-10 PROCEDURE — 82731 ASSAY OF FETAL FIBRONECTIN: CPT

## 2022-12-10 RX ORDER — ASPIRIN 81 MG/1
81 TABLET ORAL DAILY
COMMUNITY

## 2022-12-10 RX ORDER — DULOXETIN HYDROCHLORIDE 20 MG/1
20 CAPSULE, DELAYED RELEASE ORAL DAILY
COMMUNITY

## 2022-12-10 RX ORDER — NITROFURANTOIN 25; 75 MG/1; MG/1
100 CAPSULE ORAL EVERY 12 HOURS
Qty: 14 CAPSULE | Refills: 0 | Status: SHIPPED | OUTPATIENT
Start: 2022-12-10

## 2022-12-10 RX ORDER — NITROFURANTOIN 25; 75 MG/1; MG/1
100 CAPSULE ORAL ONCE
Status: COMPLETED | OUTPATIENT
Start: 2022-12-10 | End: 2022-12-10

## 2022-12-10 RX ADMIN — NITROFURANTOIN MONOHYDRATE/MACROCRYSTALLINE 100 MG: 25; 75 CAPSULE ORAL at 21:56

## 2022-12-11 NOTE — H&P
History & Physical    Name: Ellamae Aschoff MRN: [id number]  SSN: [id number]    YOB: 1990  Age: 29 y.o. Sex: female        Subjective:     Estimated Date of Delivery: 3/24/23  OB History    Para Term  AB Living   3 0 0 0 2 0   SAB IAB Ectopic Molar Multiple Live Births   2                # Outcome Date GA Lbr Nestor/2nd Weight Sex Delivery Anes PTL Lv   3 Current            2 SAB            1 SAB                Ms. Kishore Boyce is a  with pregnancy at 25w1d that reports constant pelvic pressure that started at 0300 and has increased in intensity throughout the day. Also reports burning with urination and urinary frequency. Denies abdominal cramping, vaginal bleeding, increase in vaginal discharge, leakage of vaginal fluid and has good fetal movement. Her prenatal care is complicated by fetus with hydrocephalus, type 2 DM, morbid obesity with BMI 47, anxiety, depression, Covid at 5 wks, positive parvovirus test last week, anemia, and h/o HSV. She is being followed by pediatric neurology and MFM. Has appointment with neurology 20 and will discuss whether she will deliver at [facility] [place] or [facility]. Please see prenatal records for details.     Past Medical History:   Diagnosis Date    Abnormal pap 2014+ 2015    LGSIL pap    Anemia NEC     Anxiety     Chronic pain     Depression     Diabetes ([address]     HPV vaccine counseling     gardasil series complete     HSV-2 infection     Lumbar epidural mass [address]     lumbar epidural lipomatosis    Missed  2021    Morbid obesity with body mass index (BMI) of 45.0 to 49.9 in adult (HCC)     PCOS (polycystic ovarian syndrome)     Prediabetes     Sleep apnea     Sleep disorder     sleep apnea    UTI (urinary tract infection)     Vitamin D deficiency      GynHx: denies STIs    Past Surgical History:   Procedure Laterality Date    HX COLPOSCOPY  2014 + 2015    YAHAIRA I; 2015 neg path    HX DILATION AND CURETTAGE N/A      Social History Occupational History    Not on file   Tobacco Use    Smoking status: Never    Smokeless tobacco: Never   Vaping Use    Vaping Use: Never used   Substance and Sexual Activity    Alcohol use: Not Currently     Comment: socially    Drug use: Never    Sexual activity: Yes     Partners: Male     Birth control/protection: Condom     Family History   Problem Relation Age of Onset    No Known Problems Mother     Heart Disease Father     Diabetes Father        Allergies   Allergen Reactions    Aspirin Unknown (comments)     Bleeding disorder, heavy periods. Pt reports that she is currently taking aspirin, ordered by Dr Mehreen Somers     Prior to Admission medications    Medication Sig Start Date End Date Taking? Authorizing Provider   aspirin delayed-release 81 mg tablet Take 81 mg by mouth daily. Yes Provider, Historical   DULoxetine (Cymbalta) 20 mg capsule Take 20 mg by mouth daily. Indications: anxiousness associated with depression, major depressive disorder   Yes Provider, Historical   insulin regular (HumuLIN R Regular U-100 Insuln) 100 unit/mL injection 18 Units by SubCUTAneous route nightly. Indications: type 2 diabetes mellitus   Yes Provider, Historical   metFORMIN ER (GLUCOPHAGE XR) 500 mg tablet Take 2 Tablets by mouth Before breakfast and dinner. 8/11/22  Yes Scott Maguire MD   prenatal vit/iron fum/folic ac (PRENATAL 1+1 PO) Prenatal   Yes Provider, Historical   progesterone (PROMETRIUM) 200 mg capsule Take 200 mg by mouth two (2) times a day.   Patient not taking: Reported on 12/10/2022 8/5/22   Provider, Historical   Blood Glucose Control High&Low (Accu-Chek Guide L1-L2 Ctrl Sol) soln Check blood sugar 1-3 times daily or as directed by JESUS Price 11.9 8/11/22   Scott Maguire MD   Blood-Glucose Meter (Accu-Chek Guide Glucose Meter) misc Check blood sugar 1-3 times daily or as directed by JESUS Price 11.9 8/11/22   Scott Maguire MD   glucose blood VI test strips (Accu-Chek Guide test strips) strip Check blood sugar 1-3 times daily or as directed by JESUS Price 11.9 8/11/22   Paola Tidwell MD   lancets (Accu-Chek Fastclix Lancet Drum) misc Check blood sugar 1-3 times daily or as directed by JESUS Price 11.9 8/11/22   Paola Tidwell MD   lidocaine (LIDODERM) 5 % APPLY 1 2 PATCHES TOPICALLY ONCE A DAY, MAY WEAR FOR UP TO 12 HOURS  Patient not taking: Reported on 12/10/2022 7/31/21   Provider, Historical   ibuprofen (MOTRIN) 800 mg tablet Take 1 Tablet by mouth every eight (8) hours as needed for Pain. 7/22/21   Shaheed Cosme MD      Review of Systems   Constitutional:  Negative for chills and fever. HENT:  Negative for congestion and sore throat. Eyes:  Negative for blurred vision and double vision. Respiratory:  Negative for cough, sputum production, shortness of breath and wheezing. Cardiovascular:  Negative for chest pain and palpitations. Gastrointestinal:  Negative for abdominal pain, heartburn, nausea and vomiting. Genitourinary:  Positive for dysuria, frequency and urgency. Negative for flank pain and hematuria. Neurological:  Negative for dizziness, seizures, weakness and headaches. Psychiatric/Behavioral:  Positive for depression. The patient is nervous/anxious. Objective:     Vitals:  Vitals:    12/10/22 2005 12/10/22 2048   BP:  126/64   Pulse:  78   Resp:  18   Temp:  97.9 °F (36.6 °C)   Weight: 128.8 kg (284 lb)    Height: 5' 5\" (1.651 m)         Physical Exam:  Patient without distress.   Heart: Regular rate and rhythm or S1S2 present  Lung: clear to auscultation throughout lung fields, no wheezes, no rales, no rhonchi, and normal respiratory effort  Abdomen: soft, nontender, gravid  Fundus: soft and non tender  Perineum: blood absent, amniotic fluid absent  SSE: FFN collected  Cervical Exam: Closed/Thick/High  Lower Extremities:  - Edema trace  Membranes:  Intact  Fetal Heart Rate: 150    Recent Results (from the past 12 hour(s))   URINALYSIS W/ REFLEX CULTURE    Collection Time: 12/10/22  7:42 PM    Specimen: Urine   Result Value Ref Range    Color YELLOW/STRAW      Appearance CLOUDY (A) CLEAR      Specific gravity 1.010 1.003 - 1.030      pH (UA) 6.5 5.0 - 8.0      Protein Negative NEG mg/dL    Glucose Negative NEG mg/dL    Ketone 40 (A) NEG mg/dL    Bilirubin Negative NEG      Blood Negative NEG      Urobilinogen 1.0 0.2 - 1.0 EU/dL    Nitrites Negative NEG      Leukocyte Esterase LARGE (A) NEG      WBC 5-10 0 - 4 /hpf    RBC 0-5 0 - 5 /hpf    Epithelial cells FEW FEW /lpf    Bacteria 3+ (A) NEG /hpf    UA:UC IF INDICATED CULTURE NOT INDICATED BY UA RESULT CNI          Prenatal Labs:   Lab Results   Component Value Date/Time    ABO/Rh(D) O POSITIVE 07/22/2021 01:49 PM         Assessment/Plan:     Active Problems:    UTI (urinary tract infection), affecting care of mother, antepartum, second trimester (12/10/2022)      Obesity affecting pregnancy in second trimester (12/10/2022)      Pregnancy complicated by pre-existing type 2 diabetes in second trimester (12/10/2022)      Hydrocephaly, fetal, affecting care of mother, antepartum, fetus 1 (12/10/2022)         Plan: PO hydration with at least 2 liters of water daily. Macrobid 100 mg po now, Rx for 100 mg po bid x 7 days. Urine culture. Notify MD if no improvement in 1-2 days.

## 2022-12-11 NOTE — DISCHARGE INSTRUCTIONS
ChoicePass Activation    Thank you for requesting access to ChoicePass. Please follow the instructions below to securely access and download your online medical record. ChoicePass allows you to send messages to your doctor, view your test results, renew your prescriptions, schedule appointments, and more. How Do I Sign Up? In your internet browser, go to [email or web address]  Click on the First Time User? Click Here link in the Sign In box. You will be redirect to the New Member Sign Up page. Enter your ChoicePass Access Code exactly as it appears below. You will not need to use this code after youve completed the sign-up process. If you do not sign up before the expiration date, you must request a new code. ChoicePass Access Code: Activation code not generated  Current ChoicePass Status: Active (This is the date your ChoicePass access code will )    Enter the last four digits of your Social Security Number (xxxx) and Date of Birth (mm/dd/yyyy) as indicated and click Submit. You will be taken to the next sign-up page. Create a ChoicePass ID. This will be your ChoicePass login ID and cannot be changed, so think of one that is secure and easy to remember. Create a ChoicePass password. You can change your password at any time. Enter your Password Reset Question and Answer. This can be used at a later time if you forget your password. Enter your e-mail address. You will receive e-mail notification when new information is available in [address] Click Sign Up. You can now view and download portions of your medical record. Click the [facility] [place] link to download a portable copy of your medical information. Additional Information    If you have questions, please visit the Frequently Asked Questions section of the ChoicePass website at [email or web address] Remember, ChoicePass is NOT to be used for urgent needs. For medical emergencies, dial 911.          Learning About When to Call Your Doctor During Pregnancy (After 20 Weeks)  Overview  It's common to have concerns about what might be a problem when you're pregnant. Most pregnancies don't have any serious problems. But it's still important to know when to call your doctor if you have certain symptoms or signs of labor. These are general suggestions. Your doctor may give you some more information about when to call. When to call your doctor (after 20 weeks)  Call 911  anytime you think you may need emergency care. For example, call if:  You have severe vaginal bleeding. You have sudden, severe pain in your belly. You passed out (lost consciousness). You have a seizure. You see or feel the umbilical cord. You think you are about to deliver your baby and can't make it safely to the hospital.  Call your doctor now or seek immediate medical care if:  You have vaginal bleeding. You have belly pain. You have a fever. You have symptoms of preeclampsia, such as:  Sudden swelling of your face, hands, or feet. New vision problems (such as dimness, blurring, or seeing spots). A severe headache. You have a sudden release of fluid from your vagina. (You think your water broke.)  You think that you may be in labor. This means that you've had at least 6 contractions in an hour. You notice that your baby has stopped moving or is moving much less than normal.  You have symptoms of a urinary tract infection. These may include:  Pain or burning when you urinate. A frequent need to urinate without being able to pass much urine. Pain in the flank, which is just below the rib cage and above the waist on either side of the back. Blood in your urine. Watch closely for changes in your health, and be sure to contact your doctor if:  You have vaginal discharge that smells bad. You have skin changes, such as:  A rash. Itching. Yellow color to your skin. You have other concerns about your pregnancy.   If you have labor signs at 37 weeks or more  If you have signs of labor at 37 weeks or more, your doctor may tell you to call when your labor becomes more active. Symptoms of active labor include:  Contractions that are regular. Contractions that are less than 5 minutes apart. Contractions that are hard to talk through. Follow-up care is a key part of your treatment and safety. Be sure to make and go to all appointments, and call your doctor if you are having problems. It's also a good idea to know your test results and keep a list of the medicines you take. Where can you learn more? Go to http://www.gray.com/  Enter N531 in the search box to learn more about \"Learning About When to Call Your Doctor During Pregnancy (After 20 Weeks). \"  Current as of: February 23, 2022               Content Version: 13.4  © 2006-2022 Healthwise, [employer]. Care instructions adapted under license by [employer] (which disclaims liability or warranty for this information). If you have questions about a medical condition or this instruction, always ask your healthcare professional. [place] [address] any warranty or liability for your use of this information. Weeks 22 to 26 of Your Pregnancy: Care Instructions  Your baby's lungs are getting ready for breathing. Your baby may respond to your voice. Your baby likely turns less, and kicks or jerks more. Jerking may mean that your baby has hiccups. Think about taking childbirth classes. And start to think about whether you want to have pain medicine during labor. At your next doctor visit, you may be tested for high blood sugar that first occurs during pregnancy (gestational diabetes). This condition can cause problems for you and your baby. To ease discomfort from your baby's kicking   Change your position. Take a deep breath as you raise your arm over your head, and breathe out as you drop your arm.      To ease or reduce swelling in your feet, ankles, hands, and fingers   Take off your rings. Avoid high-salt foods, such as potato chips. Prop up your feet, and sleep with pillows under your feet. Do not stand for long periods of time. Do not wear tight shoes. Wear support stockings. Kegel exercises to prevent urine from leaking  Squeeze your muscles as if you were trying not to pass gas. Your belly, legs, and buttocks shouldn't move. Hold the squeeze for 3 seconds, then relax for 5 to 10 seconds. Add 1 second each week until you can squeeze for 10 seconds. Repeat the exercise 10 times a session. Do 3 to 8 sessions a day. If these exercises cause you pain, stop doing them and talk with your doctor. Follow-up care is a key part of your treatment and safety. Be sure to make and go to all appointments, and call your doctor if you are having problems. It's also a good idea to know your test results and keep a list of the medicines you take. Where can you learn more? Go to http://www.lamas.com/  Enter G264 in the search box to learn more about \"Weeks 22 to 26 of Your Pregnancy: Care Instructions. \"  Current as of: February 23, 2022               Content Version: 13.4  © 6909-0393 [employer]. Care instructions adapted under license by [employer] (which disclaims liability or warranty for this information). If you have questions about a medical condition or this instruction, always ask your healthcare professional. [place] [address] any warranty or liability for your use of this information. Urinary Tract Infection (UTI) in Women: Care Instructions  Overview     A urinary tract infection, or UTI, is a general term for an infection anywhere between the kidneys and the urethra (where urine comes out). Most UTIs are bladder infections. They often cause pain or burning when you urinate. UTIs are caused by bacteria and can be cured with antibiotics.  Be sure to complete your treatment so that the infection does not get worse.  Follow-up care is a key part of your treatment and safety. Be sure to make and go to all appointments, and call your doctor if you are having problems. It's also a good idea to know your test results and keep a list of the medicines you take. How can you care for yourself at home? Take your antibiotics as directed. Do not stop taking them just because you feel better. You need to take the full course of antibiotics. Drink extra water and other fluids for the next day or two. This will help make the urine less concentrated and help wash out the bacteria that are causing the infection. (If you have kidney, heart, or liver disease and have to limit fluids, talk with your doctor before you increase the amount of fluids you drink.)  Avoid drinks that are carbonated or have caffeine. They can irritate the bladder. Urinate often. Try to empty your bladder each time. To relieve pain, take a hot bath or lay a heating pad set on low over your lower belly or genital area. Never go to sleep with a heating pad in place. To prevent UTIs  Drink plenty of water each day. This helps you urinate often, which clears bacteria from your system. (If you have kidney, heart, or liver disease and have to limit fluids, talk with your doctor before you increase the amount of fluids you drink.)  Urinate when you need to. If you are sexually active, urinate right after you have sex. Change sanitary pads often. Avoid douches, bubble baths, feminine hygiene sprays, and other feminine hygiene products that have deodorants. After going to the bathroom, wipe from front to back. When should you call for help? Call your doctor now or seek immediate medical care if:    Symptoms such as fever, chills, nausea, or vomiting get worse or appear for the first time. You have new pain in your back just below your rib cage. This is called flank pain. There is new blood or pus in your urine.      You have any problems with your antibiotic medicine. Watch closely for changes in your health, and be sure to contact your doctor if:    You are not getting better after taking an antibiotic for 2 days. Your symptoms go away but then come back. Where can you learn more? Go to http://www.gray.com/  Enter YARY in the search box to learn more about \"Urinary Tract Infection (UTI) in Women: Care Instructions. \"  Current as of: June 16, 2022               Content Version: 13.4  © 2006-2022 [employer]. Care instructions adapted under license by [employer] (which disclaims liability or warranty for this information). If you have questions about a medical condition or this instruction, always ask your healthcare professional. [address] any warranty or liability for your use of this information.

## 2022-12-11 NOTE — PROGRESS NOTES
1933 31yo  with vaughn of 3/24/2023 to room 206 with c/o urinary frequency, low abdominal pressure, that began during the night and has progressed throughout the day. Pt denies vaginal discharge, bleeding, or leaking fluid. UA obtained, pt oriented to room and call system.  Call to Dr Alison Espinal regarding pt arrival, [address] [facility] 150[facility].  Dr Alison Espinal at the bedside. 1 Pt discharged home ambulatory with .

## 2022-12-11 NOTE — PROGRESS NOTES
Discharge instructions given with verbal understanding by pt. All questions asked and answered at this time. Pt informed to pickup her prescriptions and to keep her next appointment. Pt is getting dressed at this time.

## 2022-12-14 ENCOUNTER — VIRTUAL VISIT (OUTPATIENT)
Dept: FAMILY MEDICINE CLINIC | Age: 32
End: 2022-12-14
Payer: COMMERCIAL

## 2022-12-14 DIAGNOSIS — G89.29 CHRONIC BILATERAL LOW BACK PAIN WITH RIGHT-SIDED SCIATICA: ICD-10-CM

## 2022-12-14 DIAGNOSIS — M54.41 CHRONIC BILATERAL LOW BACK PAIN WITH RIGHT-SIDED SCIATICA: ICD-10-CM

## 2022-12-14 DIAGNOSIS — F32.A DEPRESSION, UNSPECIFIED DEPRESSION TYPE: Primary | ICD-10-CM

## 2022-12-14 DIAGNOSIS — O24.112 PREGNANCY COMPLICATED BY PRE-EXISTING TYPE 2 DIABETES IN SECOND TRIMESTER: ICD-10-CM

## 2022-12-14 DIAGNOSIS — F41.9 ANXIETY: ICD-10-CM

## 2022-12-14 PROCEDURE — 3044F HG A1C LEVEL LT 7.0%: CPT | Performed by: FAMILY MEDICINE

## 2022-12-14 PROCEDURE — 99213 OFFICE O/P EST LOW 20 MIN: CPT | Performed by: FAMILY MEDICINE

## 2022-12-14 NOTE — PROGRESS NOTES
Chief Complaint   Patient presents with    Diabetes     Follow up. 1. Have you been to the ER, urgent care clinic since your last visit? Hospitalized since your last visit? Yes When: 12/10/22 Where: [address] [place] [address] ER Reason for visit: UTI    2. Have you seen or consulted any other health care providers outside of the [address] [place] [address] since your last visit? Include any pap smears or colon screening.  No

## 2022-12-14 NOTE — PROGRESS NOTES
Shalom Kilgore is a 29 y.o. female who was seen by synchronous (real-time) audio-video technology on 12/14/2022. Consent: Shalom Kilgore, who was seen by synchronous (real-time) audio-video technology, and/or her healthcare decision maker, is aware that this patient-initiated, Telehealth encounter on 12/14/2022 is a billable service, with coverage as determined by her insurance carrier. She is aware that she may receive a bill and has provided verbal consent to proceed: Yes. Assessment & Plan:       ICD-10-CM ICD-9-CM    1. Depression, unspecified depression type  F32. A 311       2. Anxiety  F41.9 300.00       3. Pregnancy complicated by pre-existing type 2 diabetes in second trimester  O24.112 648.03      250.00       4. Chronic bilateral low back pain with right-sided sciatica  M54.41 724.2     G89.29 724.3      338.29         Continue with all current care, no changes  Labs reviewed, reassuring  Follow with endo, ob, mfm and any fetal specialists as recommended  Back on cymbalta, supervised by her ob right now  Fu with me PP or sooner if needed for acute        Pt was counseled on risks, benefits and alternatives of treatment options. All questions were asked and answered and the patient was agreeable with the treatment plan as outlined.   Subjective:   Shalom Kilgore is a 29 y.o. female who was seen for Diabetes (Follow up.)      22 w 5d pregnant  Tells me she is doing \"pretty ok\"  A couple of complications she tells me--she had a hard few weeks recently with her anatomy scans--they saw fluid in the brain, dx of hydrocephalus   (Baby is a girl)  Pt also had parvovirus  Now she's doing weekly appts    Depressed mood is increasing, fear and anxiety is about her child and also she tells me about her  as well  Pt is in therapy right now still    DM control is improved tremendously--was 8.5, now is 5  Pt has lost weight  She is feeling good  Her eating habits are improved (changed drastically)    Her endocrinologist has been downtitrating her insulin  Shes now at 18 units at night   She was having lows  She is still on metformin    Started back on cymbalta with her ob in October  She is on it for anxiety/stress/mood control    Medications, allergies, PMH, PSH, SOCH, MADDIE QUEZADA OF [facility] reviewed and updated per routine protocol, see chart for review and changes if not noted here. ROS  A 12 point review of systems was negative except as noted here or in the HPI.     Objective:   Vital Signs: (As obtained by patient/caregiver at home)  Patient-Reported Vitals 12/14/2022   Patient-Reported Weight 282lb   Patient-Reported Height -   Patient-Reported Systolic  (No Data)   Patient-Reported LMP 06/2022        [INSTRUCTIONS:  \"[x]\" Indicates a positive item  \"[]\" Indicates a negative item  -- DELETE ALL ITEMS NOT EXAMINED]    Constitutional: [x] Appears well-developed and well-nourished [x] No apparent distress      [] Abnormal -     Mental status: [x] Alert and awake  [x] Oriented to person/place/time [x] Able to follow commands    [] Abnormal -     Eyes:   EOM    [x]  Normal    [] Abnormal -   Sclera  [x]  Normal    [] Abnormal -          Discharge [x]  None visible   [] Abnormal -     HENT: [x] Normocephalic, atraumatic  [] Abnormal -   [x] Mouth/Throat: Mucous membranes are moist    External Ears [x] Normal  [] Abnormal -    Neck: [x] No visualized mass [] Abnormal -     Pulmonary/Chest: [x] Respiratory effort normal   [x] No visualized signs of difficulty breathing or respiratory distress        [] Abnormal -      Musculoskeletal:   [] Normal gait with no signs of ataxia         [x] Normal range of motion of neck        [] Abnormal -     Neurological:        [x] No Facial Asymmetry (Cranial nerve 7 motor function) (limited exam due to video visit)          [x] No gaze palsy        [] Abnormal -          Skin:        [x] No significant exanthematous lesions or discoloration noted on facial skin         [] Abnormal - Psychiatric:       [x] Normal Affect [] Abnormal -        [x] No Hallucinations    Other pertinent observable physical exam findings:seated    We discussed the expected course, resolution and complications of the diagnosis(es) in detail. Medication risks, benefits, costs, interactions, and alternatives were discussed as indicated. I advised her to contact the office if her condition worsens, changes or fails to improve as anticipated. She expressed understanding with the diagnosis(es) and plan. Shawn Cervantes is a 29 y.o. female who was evaluated by a video visit encounter for concerns as above. Patient identification was verified prior to start of the visit. A caregiver was present when appropriate. Due to this being a TeleHealth encounter (During VJBXB-52 public health emergency), evaluation of the following organ systems was limited: Vitals/Constitutional/EENT/Resp/CV/GI//MS/Neuro/Skin/Heme-Lymph-Imm. Pursuant to the emergency declaration under the [facility], [facility] waiver authority and the [employer] Act, this Virtual  Visit was conducted, with patient's (and/or legal guardian's) consent, to reduce the patient's risk of exposure to COVID-19 and provide necessary medical care. Services were provided through a video synchronous discussion virtually to substitute for in-person clinic visit. Patient and provider were located at their individual homes. MD Kodi Barrow [facility]  12/14/22 11:02 AM     Portions of this note may have been populated using smart dictation software and may have \"sounds-like\" errors present.

## 2022-12-22 LAB
ANTIBODY SCREEN, EXTERNAL: NEGATIVE
T. PALLIDUM, EXTERNAL: NORMAL

## 2022-12-24 ENCOUNTER — HOSPITAL ENCOUNTER (EMERGENCY)
Age: 32
Discharge: HOME | End: 2022-12-25
Payer: COMMERCIAL

## 2022-12-24 PROBLEM — O47.9 UTERINE CONTRACTIONS AT GREATER THAN 20 WEEKS OF GESTATION: Status: ACTIVE | Noted: 2022-12-24

## 2022-12-24 PROBLEM — B37.31 YEAST VAGINITIS: Status: ACTIVE | Noted: 2022-12-24

## 2022-12-24 PROCEDURE — 81001 URINALYSIS AUTO W/SCOPE: CPT

## 2022-12-24 PROCEDURE — 82731 ASSAY OF FETAL FIBRONECTIN: CPT

## 2022-12-24 PROCEDURE — 87086 URINE CULTURE/COLONY COUNT: CPT

## 2022-12-24 RX ORDER — FLUCONAZOLE 100 MG/1
150 TABLET ORAL DAILY
Status: DISCONTINUED | OUTPATIENT
Start: 2022-12-25 | End: 2022-12-24

## 2022-12-24 RX ORDER — FLUCONAZOLE 100 MG/1
150 TABLET ORAL
Status: COMPLETED | OUTPATIENT
Start: 2022-12-25 | End: 2022-12-25

## 2022-12-25 VITALS
HEART RATE: 87 BPM | WEIGHT: 285 LBS | SYSTOLIC BLOOD PRESSURE: 124 MMHG | RESPIRATION RATE: 16 BRPM | DIASTOLIC BLOOD PRESSURE: 75 MMHG | BODY MASS INDEX: 47.48 KG/M2 | TEMPERATURE: 97.5 F | HEIGHT: 65 IN

## 2022-12-25 LAB
APPEARANCE UR: CLEAR
BACTERIA URNS QL MICRO: NEGATIVE /HPF
BILIRUB UR QL: NEGATIVE
COLOR UR: ABNORMAL
EPITH CASTS URNS QL MICRO: ABNORMAL /LPF
FIBRONECTIN FETAL VAG QL: NEGATIVE
GLUCOSE UR STRIP.AUTO-MCNC: NEGATIVE MG/DL
HGB UR QL STRIP: NEGATIVE
HYALINE CASTS URNS QL MICRO: ABNORMAL /LPF (ref 0–5)
KETONES UR QL STRIP.AUTO: 15 MG/DL
LEUKOCYTE ESTERASE UR QL STRIP.AUTO: ABNORMAL
NITRITE UR QL STRIP.AUTO: NEGATIVE
PH UR STRIP: 7 [PH] (ref 5–8)
PROT UR STRIP-MCNC: NEGATIVE MG/DL
RBC #/AREA URNS HPF: ABNORMAL /HPF (ref 0–5)
SP GR UR REFRACTOMETRY: 1 (ref 1–1.03)
UA: UC IF INDICATED,UAUC: ABNORMAL
UROBILINOGEN UR QL STRIP.AUTO: 0.2 EU/DL (ref 0.2–1)
WBC URNS QL MICRO: ABNORMAL /HPF (ref 0–4)

## 2022-12-25 PROCEDURE — 74011250637 HC RX REV CODE- 250/637: Performed by: ADVANCED PRACTICE MIDWIFE

## 2022-12-25 PROCEDURE — 99283 EMERGENCY DEPT VISIT LOW MDM: CPT

## 2022-12-25 RX ADMIN — FLUCONAZOLE 150 MG: 100 TABLET ORAL at 00:04

## 2022-12-25 NOTE — ROUTINE PROCESS
2340 Received OB SBAR Report from DEISY Chin, patient here for contractions, awaiting lab results and per CNM patient okay to come off [address] Discharge orders in per CNM, discharge instructions reviewed and signed, patient wheeled down to ED exit to leave with significant other I have discussed the patient with the resident.  I agree with the history, physical, assessment and plan as documented.    Patient seen via face-to-face    David Lobo MD  01/22/22

## 2022-12-25 NOTE — H&P
History & Physical    Name: Demetrius Kc MRN: [id number]  SSN: [id number]    YOB: 1990  Age: 29 y.o. Sex: female        Subjective:     Estimated Date of Delivery: 3/24/23  OB History    Para Term  AB Living   3 0 0 0 2 0   SAB IAB Ectopic Molar Multiple Live Births   2                # Outcome Date GA Lbr Nestor/2nd Weight Sex Delivery Anes PTL Lv   3 Current            2 SAB            1 SAB              Ms. Erika Morales is a  with pregnancy at 27w1d that reports irregular UC that started at [address] [facility] and has increased in intensity throughout the day. Was treated for UTI on  macrobid x 7 days and reports finishing meds 6 days ago. Denies vaginal bleeding, increase in vaginal discharge, leakage of vaginal fluid and has good fetal movement. Her prenatal care is complicated by fetus with hydrocephalus, type 2 DM, morbid obesity with BMI 47, anxiety, depression (on cymbalta), Covid at 5 wks, positive parvovirus test last week, anemia, and h/o HSV. She is being followed by pediatric neurology and MFM. Had appointment with neurology 20 and will discuss whether she will deliver at [facility] or [facility]. Please see prenatal records for details.        Past Medical History:   Diagnosis Date    Abnormal pap 2014+ 2015    LGSIL pap    Anemia NEC     Anxiety     Chronic pain     Depression     Diabetes ([address]     HPV vaccine counseling     gardasil series complete     HSV-2 infection     Lumbar epidural mass ([address]     lumbar epidural lipomatosis    Missed  2021    Morbid jealousy ([address]     Morbid obesity with body mass index (BMI) of 45.0 to 49.9 in adult ([address]     PCOS (polycystic ovarian syndrome)     Prediabetes     Sleep apnea     Sleep disorder     sleep apnea    UTI (urinary tract infection)     Vitamin D deficiency      Past Surgical History:   Procedure Laterality Date    HX COLPOSCOPY  2014 + 2015    YAHAIRA I; 2015 neg path    HX DILATION AND CURETTAGE N/A      Social History Occupational History    Not on file   Tobacco Use    Smoking status: Never    Smokeless tobacco: Never   Vaping Use    Vaping Use: Never used   Substance and Sexual Activity    Alcohol use: Not Currently     Comment: socially    Drug use: Never    Sexual activity: Yes     Partners: Male     Birth control/protection: Condom     Family History   Problem Relation Age of Onset    No Known Problems Mother     Heart Disease Father     Diabetes Father        Allergies   Allergen Reactions    Aspirin Unknown (comments)     Bleeding disorder, heavy periods. Pt reports that she is currently taking aspirin, ordered by Dr Dipti Marion     Prior to Admission medications    Medication Sig Start Date End Date Taking? Authorizing Provider   blood-glucose sensor (DEXCOM G6 SENSOR) by Does Not Apply route. Provider, Historical   blood-glucose transmitter (DEXCOM G6 TRANSMITTER) by Does Not Apply route. Provider, Historical   aspirin delayed-release 81 mg tablet Take 81 mg by mouth daily. Provider, Historical   DULoxetine (CYMBALTA) 20 mg capsule Take 20 mg by mouth daily. Indications: anxiousness associated with depression, major depressive disorder    Provider, Historical   insulin regular (NOVOLIN R, HUMULIN R) 100 unit/mL injection 18 Units by SubCUTAneous route nightly. Indications: type 2 diabetes mellitus    Provider, Historical   nitrofurantoin, macrocrystal-monohydrate, (Macrobid) 100 mg capsule Take 1 Capsule by mouth every twelve (12) hours. 12/10/22   Pooja Munguia MD   metFORMIN ER (GLUCOPHAGE XR) 500 mg tablet Take 2 Tablets by mouth Before breakfast and dinner.  8/11/22   Agapito Boateng MD   Blood Glucose Control High&Low (Accu-Chek Guide L1-L2 Ctrl Sol) soln Check blood sugar 1-3 times daily or as directed by JESUS Price 11.9 8/11/22   Agapito Boateng MD   Blood-Glucose Meter (Accu-Chek Guide Glucose Meter) misc Check blood sugar 1-3 times daily or as directed by JESUS Price 11.9 8/11/22   Agapito Boateng MD   glucose blood VI test strips (Accu-Chek Guide test strips) strip Check blood sugar 1-3 times daily or as directed by JESUS Price 11.9 8/11/22   Easton Thomas MD   lancets (Accu-Chek Fastclix Lancet Drum) misc Check blood sugar 1-3 times daily or as directed by JESUS Price 11.9 8/11/22   Easton Thomas MD   prenatal vit/iron fum/folic ac (PRENATAL 1+1 PO) Prenatal    Provider, Historical        Review of Systems: A comprehensive review of systems was negative except for that written in the HPI. Objective:     Vitals: There were no vitals filed for this visit. Physical Exam:  Patient without distress. Heart: Regular rate and rhythm, S1S2 present, or without murmur or extra heart sounds  Lung: clear to auscultation throughout lung fields, no wheezes, no rales, no rhonchi, and normal respiratory effort  Abdomen: soft, nontender, normal bowel sounds  Fundus: soft and non tender  Perineum: blood absent, amniotic fluid absent  Cervical Exam:closed/thick/high  Presenting Part: cephalic  Cervical Position: mid position  Consistency: Medium  Membranes:  Intact  Fetal Heart Rate: Reactive  Baseline: 150 beats per minute  Variability: moderate  Accelerations: yes  Decelerations: none  Uterine contractions: regular, every 1-3 minutes  Uterine irritability: yes    Prenatal Labs:   Lab Results   Component Value Date/Time    ABO/Rh(D) O POSITIVE 07/22/2021 01:49 PM         Assessment/Plan:     Active Problems:    Uterine contractions at greater than 20 weeks of gestation (12/24/2022)      Yeast vaginitis (12/24/2022)      Overview: 2/2 antibiotic treatment for UTI. Plan for 1 dose of PO diflucan       Plan:  FFN collected, treated for vaginal yeast infection. 1L LR bolus given. Danger signs and precautions reviewed. Attend next PNV as scheduled.     Cecy Saavedra CNM

## 2022-12-27 LAB
BACTERIA SPEC CULT: ABNORMAL
CC UR VC: ABNORMAL
SERVICE CMNT-IMP: ABNORMAL

## 2023-01-25 ENCOUNTER — APPOINTMENT (OUTPATIENT)
Dept: GENERAL RADIOLOGY | Age: 33
End: 2023-01-25
Attending: STUDENT IN AN ORGANIZED HEALTH CARE EDUCATION/TRAINING PROGRAM
Payer: COMMERCIAL

## 2023-01-25 ENCOUNTER — HOSPITAL ENCOUNTER (EMERGENCY)
Age: 33
Discharge: HOME HEALTH | End: 2023-01-25
Attending: STUDENT IN AN ORGANIZED HEALTH CARE EDUCATION/TRAINING PROGRAM
Payer: COMMERCIAL

## 2023-01-25 VITALS
BODY MASS INDEX: 46.15 KG/M2 | OXYGEN SATURATION: 98 % | WEIGHT: 277 LBS | HEIGHT: 65 IN | RESPIRATION RATE: 20 BRPM | DIASTOLIC BLOOD PRESSURE: 74 MMHG | HEART RATE: 101 BPM | SYSTOLIC BLOOD PRESSURE: 128 MMHG | TEMPERATURE: 98.1 F

## 2023-01-25 DIAGNOSIS — R07.9 CHEST PAIN, UNSPECIFIED TYPE: Primary | ICD-10-CM

## 2023-01-25 LAB
ALBUMIN SERPL-MCNC: 2.6 G/DL (ref 3.5–5)
ALBUMIN/GLOB SERPL: 0.6 (ref 1.1–2.2)
ALP SERPL-CCNC: 98 U/L (ref 45–117)
ALT SERPL-CCNC: 18 U/L (ref 12–78)
ANION GAP SERPL CALC-SCNC: 8 MMOL/L (ref 5–15)
AST SERPL-CCNC: 13 U/L (ref 15–37)
ATRIAL RATE: 102 BPM
BASOPHILS # BLD: 0 K/UL (ref 0–0.1)
BASOPHILS NFR BLD: 0 % (ref 0–1)
BILIRUB SERPL-MCNC: 0.3 MG/DL (ref 0.2–1)
BUN SERPL-MCNC: 3 MG/DL (ref 6–20)
BUN/CREAT SERPL: 6 (ref 12–20)
CALCIUM SERPL-MCNC: 9.6 MG/DL (ref 8.5–10.1)
CALCULATED P AXIS, ECG09: -69 DEGREES
CALCULATED R AXIS, ECG10: 63 DEGREES
CALCULATED T AXIS, ECG11: 2 DEGREES
CHLORIDE SERPL-SCNC: 106 MMOL/L (ref 97–108)
CO2 SERPL-SCNC: 22 MMOL/L (ref 21–32)
COMMENT, HOLDF: NORMAL
CREAT SERPL-MCNC: 0.53 MG/DL (ref 0.55–1.02)
CREAT UR-MCNC: 120 MG/DL
DIAGNOSIS, 93000: NORMAL
DIFFERENTIAL METHOD BLD: ABNORMAL
EOSINOPHIL # BLD: 0.1 K/UL (ref 0–0.4)
EOSINOPHIL NFR BLD: 1 % (ref 0–7)
ERYTHROCYTE [DISTWIDTH] IN BLOOD BY AUTOMATED COUNT: 13.2 % (ref 11.5–14.5)
GLOBULIN SER CALC-MCNC: 4.7 G/DL (ref 2–4)
GLUCOSE SERPL-MCNC: 106 MG/DL (ref 65–100)
HCT VFR BLD AUTO: 35.1 % (ref 35–47)
HGB BLD-MCNC: 11.4 G/DL (ref 11.5–16)
IMM GRANULOCYTES # BLD AUTO: 0 K/UL (ref 0–0.04)
IMM GRANULOCYTES NFR BLD AUTO: 0 % (ref 0–0.5)
LYMPHOCYTES # BLD: 3.4 K/UL (ref 0.8–3.5)
LYMPHOCYTES NFR BLD: 27 % (ref 12–49)
MCH RBC QN AUTO: 28.2 PG (ref 26–34)
MCHC RBC AUTO-ENTMCNC: 32.5 G/DL (ref 30–36.5)
MCV RBC AUTO: 86.9 FL (ref 80–99)
MONOCYTES # BLD: 0.6 K/UL (ref 0–1)
MONOCYTES NFR BLD: 5 % (ref 5–13)
NEUTS SEG # BLD: 8.4 K/UL (ref 1.8–8)
NEUTS SEG NFR BLD: 67 % (ref 32–75)
NRBC # BLD: 0 K/UL (ref 0–0.01)
NRBC BLD-RTO: 0 PER 100 WBC
P-R INTERVAL, ECG05: 168 MS
PLATELET # BLD AUTO: 368 K/UL (ref 150–400)
PMV BLD AUTO: 9.7 FL (ref 8.9–12.9)
POTASSIUM SERPL-SCNC: 3.9 MMOL/L (ref 3.5–5.1)
PROT SERPL-MCNC: 7.3 G/DL (ref 6.4–8.2)
PROT UR-MCNC: 17 MG/DL (ref 0–11.9)
PROT/CREAT UR-RTO: 0.1
Q-T INTERVAL, ECG07: 316 MS
QRS DURATION, ECG06: 62 MS
QTC CALCULATION (BEZET), ECG08: 411 MS
RBC # BLD AUTO: 4.04 M/UL (ref 3.8–5.2)
SAMPLES BEING HELD,HOLD: NORMAL
SODIUM SERPL-SCNC: 136 MMOL/L (ref 136–145)
TROPONIN-HIGH SENSITIVITY: 7 NG/L (ref 0–51)
VENTRICULAR RATE, ECG03: 102 BPM
WBC # BLD AUTO: 12.6 K/UL (ref 3.6–11)

## 2023-01-25 PROCEDURE — 93005 ELECTROCARDIOGRAM TRACING: CPT

## 2023-01-25 PROCEDURE — 99285 EMERGENCY DEPT VISIT HI MDM: CPT

## 2023-01-25 PROCEDURE — 96374 THER/PROPH/DIAG INJ IV PUSH: CPT

## 2023-01-25 PROCEDURE — 36415 COLL VENOUS BLD VENIPUNCTURE: CPT

## 2023-01-25 PROCEDURE — 74011250637 HC RX REV CODE- 250/637: Performed by: NURSE PRACTITIONER

## 2023-01-25 PROCEDURE — 74011000250 HC RX REV CODE- 250: Performed by: STUDENT IN AN ORGANIZED HEALTH CARE EDUCATION/TRAINING PROGRAM

## 2023-01-25 PROCEDURE — 71045 X-RAY EXAM CHEST 1 VIEW: CPT

## 2023-01-25 PROCEDURE — 80053 COMPREHEN METABOLIC PANEL: CPT

## 2023-01-25 PROCEDURE — 74011250637 HC RX REV CODE- 250/637: Performed by: STUDENT IN AN ORGANIZED HEALTH CARE EDUCATION/TRAINING PROGRAM

## 2023-01-25 PROCEDURE — 84484 ASSAY OF TROPONIN QUANT: CPT

## 2023-01-25 PROCEDURE — 74011250636 HC RX REV CODE- 250/636: Performed by: NURSE PRACTITIONER

## 2023-01-25 PROCEDURE — 99284 EMERGENCY DEPT VISIT MOD MDM: CPT

## 2023-01-25 PROCEDURE — 85025 COMPLETE CBC W/AUTO DIFF WBC: CPT

## 2023-01-25 PROCEDURE — 84156 ASSAY OF PROTEIN URINE: CPT

## 2023-01-25 RX ORDER — LIDOCAINE 4 G/100G
1 PATCH TOPICAL
Status: DISCONTINUED | OUTPATIENT
Start: 2023-01-25 | End: 2023-01-25 | Stop reason: HOSPADM

## 2023-01-25 RX ORDER — ZOLPIDEM TARTRATE 5 MG/1
5 TABLET ORAL ONCE
Status: COMPLETED | OUTPATIENT
Start: 2023-01-25 | End: 2023-01-25

## 2023-01-25 RX ORDER — ONDANSETRON 2 MG/ML
4 INJECTION INTRAMUSCULAR; INTRAVENOUS ONCE
Status: COMPLETED | OUTPATIENT
Start: 2023-01-25 | End: 2023-01-25

## 2023-01-25 RX ORDER — ACETAMINOPHEN 500 MG
1000 TABLET ORAL ONCE
Status: COMPLETED | OUTPATIENT
Start: 2023-01-25 | End: 2023-01-25

## 2023-01-25 RX ORDER — ACETAMINOPHEN 500 MG
1000 TABLET ORAL ONCE
Status: DISCONTINUED | OUTPATIENT
Start: 2023-01-25 | End: 2023-01-25 | Stop reason: HOSPADM

## 2023-01-25 RX ADMIN — ZOLPIDEM TARTRATE 5 MG: 5 TABLET, FILM COATED ORAL at 08:26

## 2023-01-25 RX ADMIN — ACETAMINOPHEN 1000 MG: 500 TABLET, FILM COATED ORAL at 05:15

## 2023-01-25 RX ADMIN — ONDANSETRON HYDROCHLORIDE 4 MG: 2 SOLUTION INTRAMUSCULAR; INTRAVENOUS at 07:12

## 2023-01-25 NOTE — PROGRESS NOTES
0988 Bedside and Verbal shift change report given to DEISY Heath RN  (oncoming nurse) by Gladys Logan RN  (offgoing nurse). Report included the following information SBAR, Procedure Summary, Intake/Output, MAR, and Recent Results. 0800 Dr. Shante Venegas at bedside assessing patient. Lab results reviewed with patient and patient to be discharged to home. Reviewed with patient sleep aids and nausea medications. Plan to have patient to Ambien prior to discharge and patient's significant other agrees to drive patient home. All questions addressed at this time. 4374 Discharge instructions printed, given to patient and reviewed with patient. Patient instructed to not drive after taking Ambien, to call doctor if she experiences fever greater than 100.4, chest pains, decreased fetal movement, regular contractions, leaking of amniotic fluid, vaginal bleeding, or any new symptoms. All questions addressed at this time. 4897 Patient taken to discharge area in wheelchair, helped to car,  and patient's significant other driving car home.

## 2023-01-25 NOTE — ED TRIAGE NOTES
Pt arrives w/ complaint of chest pain that started around 0100. Pt is currently 31 weeks pregnant and was found to have protein in their urine w/ preeclampsia workup w/ Dr. Shar Glass earlier today.

## 2023-01-25 NOTE — ED TRIAGE NOTES
7374 Patient transferred to [facility] from ED w/ complaints of chest pain (after being ruled out). Started with nausea around 0100, with chest pain to follow. Has had headaches and some blurry vision over the past week. Had Pre-E labs sent yesterday; blood pressures were normal.     9555 K. Marlena Severs at bedside for evaluation and plan of care. 8110 Report given to DEISY Ulloa RN to assume care of patient at this time.

## 2023-01-25 NOTE — ED TRIAGE NOTES
[facility] triage note       61-year-old female, , CHRISTY 3/24/23. who is 31 weeks pregnant presents to the ED with chief complaint of chest pain that began at 1 AM.  Pain is sharp in the left side of her chest.  No fevers, chills, difficulty breathing, abdominal pain, urinary symptoms, bowel symptoms. Nausea since 0100, has been able to drink sips of water. Mild vaginal pressure and back pain. Mild headache on and off over the past couple of weeks. Tylenol resolves headache. Denies bleeding, leaking of fluid, contractions, decreased FM. She has been unable to sleep well, has been actively pulling and pushing furniture, cleaning and \"nesting\" the last 48hrs. ED evaluation ruled out for PE. Labs drawn, Lidocaine patch and tylenol given in ER. Routine prenatal care in office with Dr. Shante Venegas and COLTEN. history of T2DM- on metformin and insulin, anxiety- on Cymbalta, HSV- no active lesions, BMI 52, +Parvovirus infection, Fetal hydrocephalus, anemia, polyhydramnios. BP in office wnl (120s/70s). +1protien on urine dip in office on . PCR lab sent- pending.         Past Medical History        Past Medical History:   Diagnosis Date    Abnormal pap 2014+ 2015     LGSIL pap    Anemia NEC      Anxiety      Chronic pain      Depression      Diabetes ([address]       TYPE 2    HPV vaccine counseling       gardasil series complete     HSV-2 infection      Lumbar epidural mass ([address]       lumbar epidural lipomatosis    Missed  2021    Morbid jealousy ([address]      Morbid obesity with body mass index (BMI) of 45.0 to 49.9 in adult ([address]      PCOS (polycystic ovarian syndrome)      Polycystic disease, ovaries      Prediabetes      Sleep apnea      Sleep disorder       sleep apnea    UTI (urinary tract infection)      Vitamin D deficiency              Past Surgical History         Past Surgical History:   Procedure Laterality Date    HX COLPOSCOPY   2014 + 2015     YAHAIRA I; 2015 neg path    HX DILATION AND CURETTAGE N/A                     Family History:   Problem Relation Age of Onset    No Known Problems Mother      Heart Disease Father      Diabetes Father           Social History            Socioeconomic History    Marital status:        Spouse name: Not on file    Number of children: Not on file    Years of education: Not on file    Highest education level: Not on file   Occupational History    Not on file   Tobacco Use    Smoking status: Never    Smokeless tobacco: Never   Vaping Use    Vaping Use: Never used   Substance and Sexual Activity    Alcohol use: Not Currently       Comment: socially    Drug use: Never    Sexual activity: Yes       Partners: Male       Birth control/protection: Condom   Other Topics Concern    Not on file   Social History Narrative     ** Merged History Encounter **            Social Determinants of Health      Financial Resource Strain: Not on file   Food Insecurity: Not on file   Transportation Needs: Not on file   Physical Activity: Not on file   Stress: Not on file   Social Connections: Not on file   Intimate Partner Violence: Not on file   Housing Stability: Not on file            ALLERGIES: Aspirin     Review of Systems   Respiratory:  Negative for cough and shortness of breath. Cardiovascular:  Positive for chest pain. No data found. Physical Exam  Constitutional:       General: She is not in acute distress. Appearance: She is well-developed. HENT:      Head: Normocephalic and atraumatic. Eyes:      General: No scleral icterus. Pupils: Pupils are equal, round, and reactive to light. Neck:      Trachea: No tracheal deviation. Cardiovascular:      Rate and Rhythm: Normal rate and regular rhythm. Heart sounds: No murmur heard. No friction rub. No gallop. Pulmonary:      Effort: Pulmonary effort is normal. No respiratory distress. Breath sounds: Normal breath sounds. No wheezing or rales.    Chest:      Comments: Significant tenderness to the left side of the chest wall with right palpation  Abdominal:      General: Bowel sounds are normal. There is no distension. Palpations: Abdomen is soft. Tenderness: There is no abdominal tenderness. . S>D  NST: , moderate variability. 10x10 acccels, no decels. Audible +FM  Ctx: none  SVE defer  Musculoskeletal:         General: No deformity. Cervical back: Neck supple. Right lower leg: Edema (trace nonpitting) present. Left lower leg: Edema (trace nonpitting) present. Skin:     General: Skin is warm and dry. Neurological:      Mental Status: She is alert and oriented to person, place, and time. Psychiatric:         Behavior: Behavior normal.         Medical Decision Making  Sent from ED for elevated BP and Pre-e rule out. Amount and/or Complexity of Data Reviewed  External Data Reviewed: notes, labs, radiology  Labs: ordered. Details: no acute findings     Risk  OTC drugs. Assessment:  32yo     31w5d  T2DM  Anxiety  Muscle strain   Polyhydramnios  Fetal hydrocephalus  Elevated BP in ER. Not in  labor    Plan:   NST reactive, Cat 1  Obtain PCR- wnl  Zofran IV  BP WNL in [facility]. Discussed muscle pain, anxiety, rest, and sleep. Ambien offered to help her sleep today and rest. Pt and pt  in agreement of plan. D/c home and follow up with MFM at previous scheduled appt in office tomorrow.             LABORATORY RESULTS:  Recent Results          Recent Results (from the past 24 hour(s))   EKG, 12 LEAD, INITIAL     Collection Time: 23  4:16 AM   Result Value Ref Range     Ventricular Rate 100 BPM     Atrial Rate 100 BPM     P-R Interval 138 ms     QRS Duration 64 ms     Q-T Interval 326 ms     QTC Calculation (Bezet) 420 ms     Calculated P Axis -40 degrees     Calculated R Axis 63 degrees     Calculated T Axis 7 degrees     Diagnosis           Unusual P axis, possible ectopic atrial rhythm  Low voltage QRS  Abnormal ECG  When compared with ECG of 25-OCT-2022 12:59,  Ectopic atrial rhythm has replaced Sinus rhythm  Questionable change in QRS axis      TROPONIN-HIGH SENSITIVITY     Collection Time: 01/25/23  4:32 AM   Result Value Ref Range     Troponin-High Sensitivity 7 0 - 51 ng/L   SAMPLES BEING HELD     Collection Time: 01/25/23  4:32 AM   Result Value Ref Range     SAMPLES BEING HELD 1RED 1BLUE       COMMENT           Add-on orders for these samples will be processed based on acceptable specimen integrity and analyte stability, which may vary by analyte. CBC WITH AUTOMATED DIFF     Collection Time: 01/25/23  4:32 AM   Result Value Ref Range     WBC 12.6 (H) 3.6 - 11.0 K/uL     RBC 4.04 3.80 - 5.20 M/uL     HGB 11.4 (L) 11.5 - 16.0 g/dL     HCT 35.1 35.0 - 47.0 %     MCV 86.9 80.0 - 99.0 FL     MCH 28.2 26.0 - 34.0 PG     MCHC 32.5 30.0 - 36.5 g/dL     RDW 13.2 11.5 - 14.5 %     PLATELET 729 414 - 492 K/uL     MPV 9.7 8.9 - 12.9 FL     NRBC 0.0 0  WBC     ABSOLUTE NRBC 0.00 0.00 - 0.01 K/uL     NEUTROPHILS 67 32 - 75 %     LYMPHOCYTES 27 12 - 49 %     MONOCYTES 5 5 - 13 %     EOSINOPHILS 1 0 - 7 %     BASOPHILS 0 0 - 1 %     IMMATURE GRANULOCYTES 0 0.0 - 0.5 %     ABS. NEUTROPHILS 8.4 (H) 1.8 - 8.0 K/UL     ABS. LYMPHOCYTES 3.4 0.8 - 3.5 K/UL     ABS. MONOCYTES 0.6 0.0 - 1.0 K/UL     ABS. EOSINOPHILS 0.1 0.0 - 0.4 K/UL     ABS. BASOPHILS 0.0 0.0 - 0.1 K/UL     ABS. IMM.  GRANS. 0.0 0.00 - 0.04 K/UL     DF AUTOMATED     METABOLIC PANEL, COMPREHENSIVE     Collection Time: 01/25/23  4:32 AM   Result Value Ref Range     Sodium 136 136 - 145 mmol/L     Potassium 3.9 3.5 - 5.1 mmol/L     Chloride 106 97 - 108 mmol/L     CO2 22 21 - 32 mmol/L     Anion gap 8 5 - 15 mmol/L     Glucose 106 (H) 65 - 100 mg/dL     BUN 3 (L) 6 - 20 MG/DL     Creatinine 0.53 (L) 0.55 - 1.02 MG/DL     BUN/Creatinine ratio 6 (L) 12 - 20       eGFR >60 >60 ml/min/1.73m2     Calcium 9.6 8.5 - 10.1 MG/DL     Bilirubin, total 0.3 0.2 - 1.0 MG/DL ALT (SGPT) 18 12 - 78 U/L     AST (SGOT) 13 (L) 15 - 37 U/L     Alk.  phosphatase 98 45 - 117 U/L     Protein, total 7.3 6.4 - 8.2 g/dL     Albumin 2.6 (L) 3.5 - 5.0 g/dL     Globulin 4.7 (H) 2.0 - 4.0 g/dL     A-G Ratio 0.6 (L) 1.1 - 2.2          PCR 0.1     Gisella Villeda CNM        I

## 2023-01-25 NOTE — ED PROVIDER NOTES
81-year-old female with history of DM, anxiety who is 31 weeks pregnant presents to the ED with chief complaint of chest pain that began at 1 AM.  Pain is sharp in the left side of her chest.  No fevers, chills, difficulty breathing, abdominal pain, urinary symptoms, bowel symptoms. Has had equal bilateral leg swelling for which she is being evaluated by OB/GYN for preeclampsia. Was referred to the ED by on-call OB/GYN. She did have urine collected yesterday as part of her work-up which revealed protein. The history is provided by the patient. Chest Pain (Angina)   Pertinent negatives include no cough and no shortness of breath.       Past Medical History:   Diagnosis Date    Abnormal pap 2014+ 2015    LGSIL pap    Anemia NEC     Anxiety     Chronic pain     Depression     Diabetes ([address]     TYPE 2    HPV vaccine counseling     gardasil series complete     HSV-2 infection     Lumbar epidural mass ([address]     lumbar epidural lipomatosis    Missed  2021    Morbid jealousy ([address]     Morbid obesity with body mass index (BMI) of 45.0 to 49.9 in adult ([facility])     PCOS (polycystic ovarian syndrome)     Polycystic disease, ovaries     Prediabetes     Sleep apnea     Sleep disorder     sleep apnea    UTI (urinary tract infection)     Vitamin D deficiency        Past Surgical History:   Procedure Laterality Date    HX COLPOSCOPY  2014 + 2015    YAHAIRA I; 2015 neg path    HX DILATION AND CURETTAGE N/A          Family History:   Problem Relation Age of Onset    No Known Problems Mother     Heart Disease Father     Diabetes Father        Social History     Socioeconomic History    Marital status:      Spouse name: Not on file    Number of children: Not on file    Years of education: Not on file    Highest education level: Not on file   Occupational History    Not on file   Tobacco Use    Smoking status: Never    Smokeless tobacco: Never   Vaping Use    Vaping Use: Never used   Substance and Sexual Activity    Alcohol use: Not Currently     Comment: socially    Drug use: Never    Sexual activity: Yes     Partners: Male     Birth control/protection: Condom   Other Topics Concern    Not on file   Social History Narrative    ** Merged History Encounter **          Social Determinants of Health     Financial Resource Strain: Not on file   Food Insecurity: Not on file   Transportation Needs: Not on file   Physical Activity: Not on file   Stress: Not on file   Social Connections: Not on file   Intimate Partner Violence: Not on file   Housing Stability: Not on file         ALLERGIES: Aspirin    Review of Systems   Respiratory:  Negative for cough and shortness of breath. Cardiovascular:  Positive for chest pain. Vitals:    01/25/23 0415 01/25/23 0434 01/25/23 0445   BP: 104/70 (!) 145/87 (!) 141/83   Pulse: (!) 102 100 (!) 102   Resp: 18 25 27   Temp: 98 °F (36.7 °C)     SpO2: 100% 100% 96%            Physical Exam  Constitutional:       General: She is not in acute distress. Appearance: She is well-developed. HENT:      Head: Normocephalic and atraumatic. Eyes:      General: No scleral icterus. Pupils: Pupils are equal, round, and reactive to light. Neck:      Trachea: No tracheal deviation. Cardiovascular:      Rate and Rhythm: Normal rate and regular rhythm. Heart sounds: No murmur heard. No friction rub. No gallop. Pulmonary:      Effort: Pulmonary effort is normal. No respiratory distress. Breath sounds: Normal breath sounds. No wheezing or rales. Chest:      Comments: Significant tenderness to the left side of the chest wall with right palpation  Abdominal:      General: Bowel sounds are normal. There is no distension. Palpations: Abdomen is soft. Tenderness: There is no abdominal tenderness. Musculoskeletal:         General: No deformity. Cervical back: Neck supple. Right lower leg: Edema (trace nonpitting) present.       Left lower leg: Edema (trace nonpitting) present. Skin:     General: Skin is warm and dry. Neurological:      Mental Status: She is alert and oriented to person, place, and time. Psychiatric:         Behavior: Behavior normal.        Medical Decision Making  47-year-old female presenting to the ED with chest pain in setting of pregnancy. Hypertensive in the 140s over 80s on exam, heart rate in the 90s, no unilateral leg swelling, no concern for PE. Very low concern for ACS, EKG nonischemic. Will send basic labs, troponin, check chest x-ray. Overall suspicion is for musculoskeletal chest pain given significant tenderness to her chest on exam.  Will treat with Tylenol, lidocaine patch, plan on sending the patient to L&D if ED work-up negative for preeclampsia evaluation. Amount and/or Complexity of Data Reviewed  External Data Reviewed: notes. Labs: ordered. Details: no acute findings  Radiology: ordered and independent interpretation performed. Details: no focal pneumonia on chest XR  ECG/medicine tests: ordered and independent interpretation performed. Decision-making details documented in ED Course. Risk  OTC drugs. Procedures    6:05 AM  Chest pain work-up negative. Reviewed patient's records, she has had similar chest pain over the last few months, had negative PE CT back in October. Overall suspicion for PE is very very low and do not feel further work-up is warranted at this time. Patient has had multiple elevated blood pressure readings in the 140s over 80s in the ED, spoke with labor and delivery and they would like for us to send the patient up there after discharge for preeclampsia evaluation. DISCHARGE NOTE:  6:06 AM  The patient has been re-evaluated and feeling much better and are stable for discharge. All available radiology and laboratory results have been reviewed with patient and/or available family.   Patient and/or family verbally conveyed their understanding and agreement of the patient's signs, symptoms, diagnosis, treatment and prognosis and additionally agree to follow-up as recommended in the discharge instructions or to return to the Emergency Department should their condition change or worsen prior to their follow-up appointment. All questions have been answered and patient and/or available family express understanding. LABORATORY RESULTS:  Recent Results (from the past 24 hour(s))   EKG, 12 LEAD, INITIAL    Collection Time: 01/25/23  4:16 AM   Result Value Ref Range    Ventricular Rate 100 BPM    Atrial Rate 100 BPM    P-R Interval 138 ms    QRS Duration 64 ms    Q-T Interval 326 ms    QTC Calculation (Bezet) 420 ms    Calculated P Axis -40 degrees    Calculated R Axis 63 degrees    Calculated T Axis 7 degrees    Diagnosis       Unusual P axis, possible ectopic atrial rhythm  Low voltage QRS  Abnormal ECG  When compared with ECG of 25-OCT-2022 12:59,  Ectopic atrial rhythm has replaced Sinus rhythm  Questionable change in QRS axis     TROPONIN-HIGH SENSITIVITY    Collection Time: 01/25/23  4:32 AM   Result Value Ref Range    Troponin-High Sensitivity 7 0 - 51 ng/L   SAMPLES BEING HELD    Collection Time: 01/25/23  4:32 AM   Result Value Ref Range    SAMPLES BEING HELD 1RED 1BLUE     COMMENT        Add-on orders for these samples will be processed based on acceptable specimen integrity and analyte stability, which may vary by analyte.    CBC WITH AUTOMATED DIFF    Collection Time: 01/25/23  4:32 AM   Result Value Ref Range    WBC 12.6 (H) 3.6 - 11.0 K/uL    RBC 4.04 3.80 - 5.20 M/uL    HGB 11.4 (L) 11.5 - 16.0 g/dL    HCT 35.1 35.0 - 47.0 %    MCV 86.9 80.0 - 99.0 FL    MCH 28.2 26.0 - 34.0 PG    MCHC 32.5 30.0 - 36.5 g/dL    RDW 13.2 11.5 - 14.5 %    PLATELET 375 820 - 592 K/uL    MPV 9.7 8.9 - 12.9 FL    NRBC 0.0 0  WBC    ABSOLUTE NRBC 0.00 0.00 - 0.01 K/uL    NEUTROPHILS 67 32 - 75 %    LYMPHOCYTES 27 12 - 49 %    MONOCYTES 5 5 - 13 %    EOSINOPHILS 1 0 - 7 % BASOPHILS 0 0 - 1 %    IMMATURE GRANULOCYTES 0 0.0 - 0.5 %    ABS. NEUTROPHILS 8.4 (H) 1.8 - 8.0 K/UL    ABS. LYMPHOCYTES 3.4 0.8 - 3.5 K/UL    ABS. MONOCYTES 0.6 0.0 - 1.0 K/UL    ABS. EOSINOPHILS 0.1 0.0 - 0.4 K/UL    ABS. BASOPHILS 0.0 0.0 - 0.1 K/UL    ABS. IMM. GRANS. 0.0 0.00 - 0.04 K/UL    DF AUTOMATED     METABOLIC PANEL, COMPREHENSIVE    Collection Time: 01/25/23  4:32 AM   Result Value Ref Range    Sodium 136 136 - 145 mmol/L    Potassium 3.9 3.5 - 5.1 mmol/L    Chloride 106 97 - 108 mmol/L    CO2 22 21 - 32 mmol/L    Anion gap 8 5 - 15 mmol/L    Glucose 106 (H) 65 - 100 mg/dL    BUN 3 (L) 6 - 20 MG/DL    Creatinine 0.53 (L) 0.55 - 1.02 MG/DL    BUN/Creatinine ratio 6 (L) 12 - 20      eGFR >60 >60 ml/min/1.73m2    Calcium 9.6 8.5 - 10.1 MG/DL    Bilirubin, total 0.3 0.2 - 1.0 MG/DL    ALT (SGPT) 18 12 - 78 U/L    AST (SGOT) 13 (L) 15 - 37 U/L    Alk. phosphatase 98 45 - 117 U/L    Protein, total 7.3 6.4 - 8.2 g/dL    Albumin 2.6 (L) 3.5 - 5.0 g/dL    Globulin 4.7 (H) 2.0 - 4.0 g/dL    A-G Ratio 0.6 (L) 1.1 - 2.2         IMAGING RESULTS:  No results found. MEDICATIONS GIVEN:  Medications   lidocaine 4 % patch 1 Patch (1 Patch TransDERmal Apply Patch 1/25/23 0515)   acetaminophen (TYLENOL) tablet 1,000 mg (1,000 mg Oral Given 1/25/23 0515)       IMPRESSION:  1.  Chest pain, unspecified type        PLAN:  Follow-up Information       Follow up With Specialties Details Why Contact Info    Alee Shea MD Family Medicine In 3 days  [address] [id number] [phone]      [facility] [address] [facility] Emergency Medicine  As needed, If symptoms worsen [address] [facility]  [phone]          Current Discharge Medication List          Signed By: Shashi Paez MD     January 25, 2023

## 2023-01-25 NOTE — DISCHARGE INSTRUCTIONS
HYPERTENSIVE DISORDERS OF PREGNANCY DISCHARGE INSTRUCTIONS    Name: Roberto Carlos Ellington  YOB: 1990  Primary Diagnosis: Active Problems:    * No active hospital problems. *      Introduction:  You have visited the hospital because your physician thinks your blood pressure is increased or increasing. This condition during pregnancy is called gestational hypertension. If you also have an elevated level of protein in your urine, this condition is called preeclampsia. Some of the characteristics you may have seen or felt include edema (usually swelling of face, feet, ankles or hands), and increased weight gain, headaches, spots (floaters) before your eyes or excessive epigastric pain (much more intense than indigestion). It is because of these symptoms and the effect on you and your baby/babies that you have come to the hospital. These guidelines are for your information at home to help you decide whether to call your physician an possibly the need for hospitalization. General:     Continue to follow up with doctor if symptoms change    Diet/Diet Restrictions: Follow your regularly prescribed diet    Physical Activity / Restrictions / Safety:     * Activity at home is based on how you may be feeling or at the direction of your physician. As tolerated, avoid heavy lifting. Discharge Instructions/ Special Treatment/ Home Care Needs:     Call your provider if:  You notice an increase in swelling or excessive epigastric pain. You have a constant headache not relieved by Tylenol. You have dizziness while carrying out activities of daily living. You see spots (floaters) before your eyes. You start having regular painful contractions every 5 minutes or less for 1 hour. Time your contractions from the beginning of one to the beginning of the next one. Your baby is not moving as much as usual-at least 4 fetal movements in 1 hour after drinking and resting on your side for 1 hour.    You have a gush of fluid or blood from your vagina (it is normal to have spotting after vaginal exam or intercourse). Other:       labor instructions:   Uterine cramping (menstrual-like cramps, intermittent or constant  Uterine contractions every 10-15 minutes or more frequently  Low abdominal pressure (pelvic pressure)  Dull low backache (intermittent or constant)  Increase or change in vaginal discharge  Feeling that the baby is \"pushing down\"  Abdominal cramping with or without diarrhea  If any of these symptoms are experienced, stop what you are doing, lie down on your side, drink two to three glasses of water and wait one hour. If the symptoms persist or get worse, call your provider. Pain Management:             Discharge Checklist-NURSING TO COMPLETE:     Date and Time of Discharge: Date: 2023 Time: 7:57 AM    Return of:   Dental Appliance:    Vision:    Hearing Aid:    Jewelry:    Clothing:    Other Valuables:    Valuables sent to safe:      Prescription Given: no  Medication Instruction Sheet(s), including side effects, provided: no    Accompanied By: Family    Mode of Transportation:    Discharge Disposition: Home    I have had the opportunity to make my options or choices for discharge. I have received and understand these instructions. Call 911 anytime you think your child may need emergency care. For example, call if:    Your child passes out (loses consciousness). Your child seems very sick or is hard to wake up. Call your doctor now or seek immediate medical care if:    Your child has new or worse belly pain. Your child has a fever with a stiff neck or a severe headache. Your child has signs of needing more fluids. These signs include sunken eyes with few tears, a dry mouth with little or no spit, and little or no urine for 6 hours. Your child vomits blood or what looks like coffee grounds. Your child's vomiting gets worse.    Watch closely for changes in your child's health, and be sure to contact your doctor if:    The vomiting is not better in 1 day (24 hours). Your child does not get better as expected. Where can you learn more? Go to http://fozia-radha.info/  Enter X791 in the search box to learn more about \"Nausea and Vomiting in Children: Care Instructions. \"  Current as of: June 6, 2022               Content Version: 13.4  © 2006-2022 [employer]. Care instructions adapted under license by [employer] (which disclaims liability or warranty for this information). If you have questions about a medical condition or this instruction, always ask your healthcare professional. [place] [address] any warranty or liability for your use of this information. Chest Pain (Angina): After Your Visit to the Emergency Room  Your Care Instructions  You have chest pain called angina because at times your heart does not get enough blood. This is caused by coronary artery disease (CAD). It causes major blood vessels to the heart to become narrow or blocked. You did not have a heart attack, but CAD does increase your risk for a heart attack. Even though you have been released from the emergency room, you still need to watch for any problems. The doctor carefully checked you. But sometimes problems can develop later. A visit to the emergency room is only one step in your treatment. Even if you feel better, you still need to do what your doctor recommends, such as going to all suggested follow-up appointments and taking medicines exactly as directed. This will help you recover and help prevent future problems. How can you care for yourself at home? If your doctor has given you nitroglycerin or other nitrate medicine, keep it with you at all times. If you have chest pain, sit down and rest, and take your nitroglycerin or other nitrates as directed.  If your chest pain does not get better after 5 minutes, call 911. Take your medicine exactly as prescribed. Call your doctor if you think you are having a problem with your medicine. When should you call for help? Call 911 if: You passed out (lost consciousness). You have chest pain that does not go away with rest or is not getting better within 5 minutes after you take a dose of nitroglycerin. You have symptoms of a heart attack. These may include:  Chest pain or pressure, or a strange feeling in the chest.  Sweating. Shortness of breath. Nausea or vomiting. Pain, pressure, or a strange feeling in the back, neck, jaw, or upper belly or in one or both shoulders or arms. Lightheadedness or sudden weakness. A fast or irregular pulse. After you call 911, the  may tell you to chew 1 adult-strength or 2 to 4 low-dose aspirin. Wait for an ambulance. Do not try to drive yourself. You have other symptoms that you think are a medical emergency. Return to the emergency room now if:  You are having chest pain more often than usual, even if it goes away when you rest or take nitroglycerin. You feel dizzy or lightheaded, or you feel like you may faint. Where can you learn more? Go to "[employer]be  Enter Y084 in the search box to learn more about \"Chest Pain (Angina): After Your Visit to the Emergency Room. \"   © 0180-4713 [employer]. Care instructions adapted under license by Maranda Hitchcock (which disclaims liability or warranty for this information). This care instruction is for use with your licensed healthcare professional. If you have questions about a medical condition or this instruction, always ask your healthcare professional. [address] any warranty or liability for your use of this information. Content Version: 9.4.29687;  Last Revised: February 23, 2012                        These are general instructions for a healthy lifestyle:    No smoking/ No tobacco products/ Avoid exposure to second hand smoke    Surgeon General's Warning:  Quitting smoking now greatly reduces serious risk to your health. Obesity, smoking, and sedentary lifestyle greatly increases your risk for illness    A healthy diet, regular physical exercise & weight monitoring are important for maintaining a healthy lifestyle    Recognize signs and symptoms of STROKE:    F-face looks uneven    A-arms unable to move or move unevenly    S-speech slurred or non-existent    T-time-call 911 as soon as signs and symptoms begin-DO NOT go       Back to bed or wait to see if you get better-TIME IS BRAIN.

## 2023-02-06 ENCOUNTER — HOSPITAL ENCOUNTER (EMERGENCY)
Age: 33
Discharge: HOME | End: 2023-02-06
Attending: OBSTETRICS & GYNECOLOGY | Admitting: OBSTETRICS & GYNECOLOGY
Payer: COMMERCIAL

## 2023-02-06 VITALS
DIASTOLIC BLOOD PRESSURE: 69 MMHG | WEIGHT: 277 LBS | HEIGHT: 65 IN | BODY MASS INDEX: 46.15 KG/M2 | RESPIRATION RATE: 16 BRPM | SYSTOLIC BLOOD PRESSURE: 119 MMHG | TEMPERATURE: 98.3 F

## 2023-02-06 LAB
ALBUMIN SERPL-MCNC: 2.4 G/DL (ref 3.5–5)
ALBUMIN/GLOB SERPL: 0.6 (ref 1.1–2.2)
ALP SERPL-CCNC: 96 U/L (ref 45–117)
ALT SERPL-CCNC: 21 U/L (ref 12–78)
ANION GAP SERPL CALC-SCNC: 7 MMOL/L (ref 5–15)
AST SERPL-CCNC: 12 U/L (ref 15–37)
BILIRUB SERPL-MCNC: 0.2 MG/DL (ref 0.2–1)
BUN SERPL-MCNC: 4 MG/DL (ref 6–20)
BUN/CREAT SERPL: 9 (ref 12–20)
CALCIUM SERPL-MCNC: 9 MG/DL (ref 8.5–10.1)
CHLORIDE SERPL-SCNC: 107 MMOL/L (ref 97–108)
CO2 SERPL-SCNC: 22 MMOL/L (ref 21–32)
CREAT SERPL-MCNC: 0.43 MG/DL (ref 0.55–1.02)
CREAT UR-MCNC: 32.5 MG/DL
ERYTHROCYTE [DISTWIDTH] IN BLOOD BY AUTOMATED COUNT: 13.2 % (ref 11.5–14.5)
FIBRONECTIN FETAL VAG QL: NEGATIVE
GLOBULIN SER CALC-MCNC: 3.9 G/DL (ref 2–4)
GLUCOSE SERPL-MCNC: 89 MG/DL (ref 65–100)
HCT VFR BLD AUTO: 31.3 % (ref 35–47)
HGB BLD-MCNC: 10.7 G/DL (ref 11.5–16)
MCH RBC QN AUTO: 29 PG (ref 26–34)
MCHC RBC AUTO-ENTMCNC: 34.2 G/DL (ref 30–36.5)
MCV RBC AUTO: 84.8 FL (ref 80–99)
NRBC # BLD: 0 K/UL (ref 0–0.01)
NRBC BLD-RTO: 0 PER 100 WBC
PLATELET # BLD AUTO: 328 K/UL (ref 150–400)
PMV BLD AUTO: 10 FL (ref 8.9–12.9)
POTASSIUM SERPL-SCNC: 4 MMOL/L (ref 3.5–5.1)
PROT SERPL-MCNC: 6.3 G/DL (ref 6.4–8.2)
PROT UR-MCNC: 7 MG/DL (ref 0–11.9)
PROT/CREAT UR-RTO: 0.2
RBC # BLD AUTO: 3.69 M/UL (ref 3.8–5.2)
SODIUM SERPL-SCNC: 136 MMOL/L (ref 136–145)
WBC # BLD AUTO: 10.1 K/UL (ref 3.6–11)

## 2023-02-06 PROCEDURE — 74011250637 HC RX REV CODE- 250/637: Performed by: OBSTETRICS & GYNECOLOGY

## 2023-02-06 PROCEDURE — 80053 COMPREHEN METABOLIC PANEL: CPT

## 2023-02-06 PROCEDURE — 85027 COMPLETE CBC AUTOMATED: CPT

## 2023-02-06 PROCEDURE — 74011250636 HC RX REV CODE- 250/636: Performed by: OBSTETRICS & GYNECOLOGY

## 2023-02-06 PROCEDURE — 99285 EMERGENCY DEPT VISIT HI MDM: CPT

## 2023-02-06 PROCEDURE — 96360 HYDRATION IV INFUSION INIT: CPT

## 2023-02-06 PROCEDURE — 36415 COLL VENOUS BLD VENIPUNCTURE: CPT

## 2023-02-06 PROCEDURE — 84156 ASSAY OF PROTEIN URINE: CPT

## 2023-02-06 PROCEDURE — 82731 ASSAY OF FETAL FIBRONECTIN: CPT

## 2023-02-06 RX ORDER — SODIUM CHLORIDE, SODIUM LACTATE, POTASSIUM CHLORIDE, CALCIUM CHLORIDE 600; 310; 30; 20 MG/100ML; MG/100ML; MG/100ML; MG/100ML
125 INJECTION, SOLUTION INTRAVENOUS CONTINUOUS
Status: DISCONTINUED | OUTPATIENT
Start: 2023-02-06 | End: 2023-02-06 | Stop reason: HOSPADM

## 2023-02-06 RX ORDER — ACETAMINOPHEN 325 MG/1
650 TABLET ORAL ONCE
Status: COMPLETED | OUTPATIENT
Start: 2023-02-06 | End: 2023-02-06

## 2023-02-06 RX ADMIN — SODIUM CHLORIDE, POTASSIUM CHLORIDE, SODIUM LACTATE AND CALCIUM CHLORIDE 500 ML: 600; 310; 30; 20 INJECTION, SOLUTION INTRAVENOUS at 17:22

## 2023-02-06 RX ADMIN — ACETAMINOPHEN 650 MG: 325 TABLET ORAL at 17:34

## 2023-02-06 NOTE — DISCHARGE INSTRUCTIONS
Weeks 32 to 34 of Your Pregnancy: Care Instructions  Decide whether you want to bank or donate your baby's umbilical cord blood. If you want to save this blood, you have to arrange for it ahead of time. Decide about circumcision. Personal, Orthodoxy, or cultural beliefs may play a role in your decision. You get to decide what you want for your baby. Learn how to ease hemorrhoids. Get more liquids, fruits, vegetables, and fiber in your diet. Avoid sitting for too long. Clean yourself with moist toilet paper. Or try witch hazel pads. Try ice packs or warm sitz baths for discomfort. Use hydrocortisone cream for pain or itching. Ask your doctor about stool softeners. Consider the benefits of breastfeeding. It reduces your baby's risk of sudden infant death syndrome (SIDS).  babies are less likely to get certain infections. And they're less likely to be obese or get diabetes later in life. It can lower your risk of breast and ovarian cancers and osteoporosis. It saves you money. Follow-up care is a key part of your treatment and safety. Be sure to make and go to all appointments, and call your doctor if you are having problems. It's also a good idea to know your test results and keep a list of the medicines you take. Where can you learn more? Go to http://www.lamas.com/  Enter X711 in the search box to learn more about \"Weeks 32 to 34 of Your Pregnancy: Care Instructions. \"  Current as of: February 23, 2022               Content Version: 13.4  © 2006-2022 Healthwise, Incorporated. Care instructions adapted under license by [employer] (which disclaims liability or warranty for this information). If you have questions about a medical condition or this instruction, always ask your healthcare professional. [address] any warranty or liability for your use of this information.

## 2023-02-06 NOTE — PROGRESS NOTES
1625 Patient arrived from Dr Femi Koenig office for r/o PTL. Positive fetal movement but patient states not as frequent as usual, no vaginal bleeding or leaking of fluid    1715 Notified Dr Madina Lin fhr strip was tachycardic and now is coming down to baseline.  MD botello labs sent and IV fluid bolus started    1720 Dr Madina Lin at bedside    1828 Dr Madina Lin sending patient home    [address] over discharge instructions with patient and patient verbalized understanding

## 2023-02-06 NOTE — H&P
History & Physical    Name: Rachid Ybarra MRN: [id number]  SSN: [id number]    YOB: 1990  Age: 29 y.o. Sex: female        Subjective:     Estimated Date of Delivery: 3/24/23  OB History    Para Term  AB Living   3 0 0 0 2 0   SAB IAB Ectopic Molar Multiple Live Births   2                # Outcome Date GA Lbr Nestor/2nd Weight Sex Delivery Anes PTL Lv   3 Current            2 SAB            1 SAB                Ms. Ac Ricketts is admitted with pregnancy at 33w3d for rule out  labor and preeclampsia. She was seen in office today c/o ctx/pain. She was found to be 1 cm. She also c/o decreased fetal movement. She denies vb, lof. She endorsed fetal movement once she arrived to L&D. She denies HA, visual changes, RUQ pain. Prenatal course is complicated by:  -Type 2 diabetes mellitus: on metformin 200 mg, nph 18u qhs, managed by endo  -Anxiety/depression: on cymbalta  -Genital herpes simplex: needs suppression at 36 weeks  -BMI 52  -Parvovirus infection: twice weekly surveillance   -Hydrocephalus: Noted at 20 wks scan, MRI with [facility], Neurology consult done, Peds Cards consult done (normal), Neurosurgery consult done - to be at delivery at [facility] Dr. Brigido Barron [phone], delivery planning - head measuring 34 wks at 26 wks - planning primary c/s  -H/o iron deficiency anemia, starting preg Hgb 12.0, does not do well with po iron, would need IV iron if anemia in pregnancy develops  -Polyhydramnios: KRISTIN 30    Please see prenatal records for details.     Past Medical History:   Diagnosis Date    Abnormal pap 2014+ 2015    LGSIL pap    Anemia NEC     Anxiety     Chronic pain     Depression     Diabetes ([facility] [place] [address]     TYPE 2    HPV vaccine counseling     gardasil series complete     HSV-2 infection     Lumbar epidural mass ([facility] [place] [address]     lumbar epidural lipomatosis    Missed  2021    Morbid jealousy ([facility] [place] [address]     Morbid obesity with body mass index (BMI) of 45.0 to 49.9 in adult [facility])     PCOS (polycystic ovarian syndrome)     Polycystic disease, ovaries     Prediabetes     Sleep apnea     Sleep disorder     sleep apnea    UTI (urinary tract infection)     Vitamin D deficiency      Past Surgical History:   Procedure Laterality Date    HX COLPOSCOPY  2/2014 + 4/2015    YAHAIRA I; 2015 neg path    HX DILATION AND CURETTAGE N/A      Social History     Occupational History    Not on file   Tobacco Use    Smoking status: Never    Smokeless tobacco: Never   Vaping Use    Vaping Use: Never used   Substance and Sexual Activity    Alcohol use: Not Currently     Comment: socially    Drug use: Never    Sexual activity: Yes     Partners: Male     Birth control/protection: Condom     Family History   Problem Relation Age of Onset    No Known Problems Mother     Heart Disease Father     Diabetes Father        Allergies   Allergen Reactions    Aspirin Unknown (comments)     Bleeding disorder, heavy periods. Pt reports that she is currently taking aspirin, ordered by Dr Farzaneh Bryan     Prior to Admission medications    Medication Sig Start Date End Date Taking? Authorizing Provider   aspirin delayed-release 81 mg tablet Take 81 mg by mouth daily. Yes Provider, Historical   DULoxetine (CYMBALTA) 20 mg capsule Take 20 mg by mouth daily. Indications: anxiousness associated with depression, major depressive disorder   Yes Provider, Historical   nitrofurantoin, macrocrystal-monohydrate, (Macrobid) 100 mg capsule Take 1 Capsule by mouth every twelve (12) hours. 12/10/22  Yes Chester Carroll MD   metFORMIN ER (GLUCOPHAGE XR) 500 mg tablet Take 2 Tablets by mouth Before breakfast and dinner. 8/11/22  Yes Noemy Akbar MD   prenatal vit/iron fum/folic ac (PRENATAL 1+1 PO) Prenatal   Yes Provider, Historical   blood-glucose sensor (DEXCOM G6 SENSOR) by Does Not Apply route. Provider, Historical   blood-glucose transmitter (DEXCOM G6 TRANSMITTER) by Does Not Apply route.     Provider, Historical   insulin regular (Adrian Estes HUMULIN R) 100 unit/mL injection 22 Units by SubCUTAneous route nightly. Indications: type 2 diabetes mellitus    Provider, Historical   Blood Glucose Control High&Low (Accu-Chek Guide L1-L2 Ctrl Sol) soln Check blood sugar 1-3 times daily or as directed by JESUS Price 11.22   Dennie Knudsen, MD   Blood-Glucose Meter (Accu-Chek Guide Glucose Meter) misc Check blood sugar 1-3 times daily or as directed by JESUS Price 11.22   Dennie Knudsen, MD   glucose blood VI test strips (Accu-Chek Guide test strips) strip Check blood sugar 1-3 times daily or as directed by JESUS Price 11.22   Dennie Knudsen, MD   lancets (Accu-Chek Fastclix Lancet Drum) misc Check blood sugar 1-3 times daily or as directed by JEUSS Price 11.22   Dennie Knudsen, MD        Review of Systems: A comprehensive review of systems was negative except for that written in the HPI. Objective:     Vitals:  Vitals:    23 1630 23 1635   BP:  127/76   Resp:  16   Temp:  98.3 °F (36.8 °C)   Weight: 125.6 kg (277 lb)    Height: 5' 5\" (1.651 m)         Physical Exam:  Patient without distress. Lung: normal respiratory effort  Abdomen: soft, nontender  Fundus: soft and non tender  Perineum: blood absent, amniotic fluid absent  Cervical Exam: FT/long/high  Membranes:  Intact  Fetal Heart Rate: Reactive  Baseline: 150 per minute  Variability: moderate  Accelerations: yes  Decelerations: none  Uterine contractions: none    Prenatal Labs:   Lab Results   Component Value Date/Time    ABO/Rh(D) O POSITIVE 2021 01:49 PM         Assessment/Plan:     Active Problems:    * No active hospital problems. *    30 y/o  with IUP at 33 3/7 weeks with high risk pregnancy presents for r/o PTL and preE. -Cervix unchanged, no ctx, ffn neg  -Normotensive, preE labs wnl  -NST reactive    Will discharge home with strict PTL/preE precautions. M visit with US tomorrow in office.

## 2023-02-09 LAB — GRBS, EXTERNAL: NEGATIVE

## 2023-02-22 ENCOUNTER — HOSPITAL ENCOUNTER (EMERGENCY)
Age: 33
Discharge: HOME | End: 2023-02-22
Attending: OBSTETRICS & GYNECOLOGY
Payer: COMMERCIAL

## 2023-02-22 VITALS — DIASTOLIC BLOOD PRESSURE: 77 MMHG | SYSTOLIC BLOOD PRESSURE: 135 MMHG

## 2023-02-22 DIAGNOSIS — O47.00 PRETERM CONTRACTIONS: Primary | ICD-10-CM

## 2023-02-22 PROCEDURE — 99283 EMERGENCY DEPT VISIT LOW MDM: CPT

## 2023-02-22 RX ORDER — ZOLPIDEM TARTRATE 5 MG/1
5 TABLET ORAL
Qty: 10 TABLET | Refills: 0 | Status: SHIPPED | OUTPATIENT
Start: 2023-02-22

## 2023-02-22 NOTE — PROGRESS NOTES
0825 Pt arrived to L&D with complaint of PTL. Pt checked yesterday and was 1cm. 5335  to bedside to assess. Still 1cm. Thomas every 20 mins. Plan is for pt to go home and take [place]. Scheduled c/s for Tuesday.     1010 Pt discahrged and understands reasons to come back into hospital.

## 2023-02-22 NOTE — H&P
Obstetrics Admission History & Physical    Name: Betito Aguillon MRN: [id number]  SSN: [id number]    YOB: 1990  Age: 29 y.o. Sex: female      Subjective:     Reason for Admission:  Pregnancy and contractions    History of Present Illness: Betito Aguillon is a 29 y.o.  female with an estimated gestational age of 27w7d with Estimated Date of Delivery: 3/24/23. Patient complains of persistent contractions since yesterday. She was seen in the office and exam revealed 3. Denies VB, LOF. Unable to sleep last night given contractions. Now every 20min. Pregnancy has been complicated by:  Type 2 DM - on metformin and insulin, pt reports good control  Anxeity/depression  COVID in first trimester - no long symptoms, on ASA  BMI 52.5  Parvovirus IgM positive  Hydrocephalus - noted at 20wk scan, s/p MRI and neuro consult, scheduled C/s for delivery at 37wk  Anemia - on Fe  H/o SAB x 2 - 2nd SAB with trisomy 22  Polyhydramnios  Elevated BP at 33wk, normal since and neg work up    GBS neg. For additional details see prenatal records.     OB History          3    Para   0    Term   0       0    AB   2    Living   0         SAB   2    IAB        Ectopic        Molar        Multiple        Live Births                  Past Medical History:   Diagnosis Date    Abnormal pap 2014+ 2015    LGSIL pap    Anemia NEC     Anxiety     Chronic pain     Depression     Diabetes ([address]     TYPE 2    HPV vaccine counseling     gardasil series complete     HSV-2 infection     Lumbar epidural mass ([address]     lumbar epidural lipomatosis    Missed  2021    Morbid jealousy ([address]     Morbid obesity with body mass index (BMI) of 45.0 to 49.9 in adult (H[facility])     PCOS (polycystic ovarian syndrome)     Polycystic disease, ovaries     Prediabetes     Sleep apnea     Sleep disorder     sleep apnea    UTI (urinary tract infection)     Vitamin D deficiency      Past Surgical History: Procedure Laterality Date    HX COLPOSCOPY  2/2014 + 4/2015    YAHAIRA I; 2015 neg path    HX DILATION AND CURETTAGE N/A      Social History     Socioeconomic History    Marital status:      Spouse name: Not on file    Number of children: Not on file    Years of education: Not on file    Highest education level: Not on file   Occupational History    Not on file   Tobacco Use    Smoking status: Never    Smokeless tobacco: Never   Vaping Use    Vaping Use: Never used   Substance and Sexual Activity    Alcohol use: Not Currently     Comment: socially    Drug use: Never    Sexual activity: Yes     Partners: Male     Birth control/protection: Condom   Other Topics Concern    Not on file   Social History Narrative    ** Merged History Encounter **          Social Determinants of Health     Financial Resource Strain: Not on file   Food Insecurity: Not on file   Transportation Needs: Not on file   Physical Activity: Not on file   Stress: Not on file   Social Connections: Not on file   Intimate Partner Violence: Not on file   Housing Stability: Not on file     Family History   Problem Relation Age of Onset    No Known Problems Mother     Heart Disease Father     Diabetes Father      Allergies   Allergen Reactions    Aspirin Unknown (comments)     Bleeding disorder, heavy periods. Pt reports that she is currently taking aspirin, ordered by Dr Leana Álvarez     Prior to Admission medications    Medication Sig Start Date End Date Taking? Authorizing Provider   blood-glucose sensor (DEXCOM G6 SENSOR) by Does Not Apply route. Provider, Historical   blood-glucose transmitter (DEXCOM G6 TRANSMITTER) by Does Not Apply route. Provider, Historical   aspirin delayed-release 81 mg tablet Take 81 mg by mouth daily. Provider, Historical   DULoxetine (CYMBALTA) 20 mg capsule Take 20 mg by mouth daily.  Indications: anxiousness associated with depression, major depressive disorder    Provider, Historical   insulin regular (Kevin Michelle HUMULIN R) 100 unit/mL injection 22 Units by SubCUTAneous route nightly. Indications: type 2 diabetes mellitus    Provider, Historical   nitrofurantoin, macrocrystal-monohydrate, (Macrobid) 100 mg capsule Take 1 Capsule by mouth every twelve (12) hours. 12/10/22   Sophia Rutherford MD   metFORMIN ER (GLUCOPHAGE XR) 500 mg tablet Take 2 Tablets by mouth Before breakfast and dinner. 8/11/22   Gopal Benz MD   Blood Glucose Control High&Low (Accu-Chek Guide L1-L2 Ctrl Sol) soln Check blood sugar 1-3 times daily or as directed by JESUS Price 11.9 8/11/22   Gopal Benz MD   Blood-Glucose Meter (Accu-Chek Guide Glucose Meter) misc Check blood sugar 1-3 times daily or as directed by JESUS Price 11.9 8/11/22   Gopal Benz MD   glucose blood VI test strips (Accu-Chek Guide test strips) strip Check blood sugar 1-3 times daily or as directed by JESUS Price 11.9 8/11/22   Gopal Benz MD   lancets (Accu-Chek Fastclix Lancet Drum) misc Check blood sugar 1-3 times daily or as directed by JESUS Price 11.9 8/11/22   Gopal Benz MD   prenatal vit/iron fum/folic ac (PRENATAL 1+1 PO) Prenatal    Provider, Historical        Review of Systems:  A comprehensive review of systems was negative except for that written in the History of Present Illness. Objective:     Vitals:  Blood pressure 135/77, last menstrual period 06/04/2022. No data recorded. BP  Min: 135/77  Max: 135/77     Physical Exam:  Patient without distress. Heart: Regular rate and rhythm  Lung: normal respiratory effort  Abdomen: soft, nontender, gravid  Cervical Exam: 1/30/-3  Lower Extremities:  - Edema 1+       Membranes:  Intact    Uterine Activity:  irregular/rare    Fetal Heart Rate:  Reactive  An NST was performed and was reactive. The baseline FHR was 135. Moderate baseline  variability was noted. Accelerations of sufficient amplitude and duration were noted. There were no decelerations noted.     Labs: No results found for this or any previous visit (from the past 24 hour(s)). Assessment and Plan:      at 35w5d with prodromal labor vs. BHCs, limiting ability to sleep. Cervix low risk. Scheduled for delivery next week.     - reviewed unchanged cervix and therefore not likely in labor  - strict precautions reviewed  - follow up appt scheduled in 2 days  - will rx ambien to help with sleep     Signed By:  Tea Garnica MD     2023

## 2023-02-28 ENCOUNTER — ANESTHESIA (OUTPATIENT)
Dept: LABOR AND DELIVERY | Age: 33
End: 2023-02-28
Payer: COMMERCIAL

## 2023-02-28 ENCOUNTER — ANESTHESIA EVENT (OUTPATIENT)
Dept: LABOR AND DELIVERY | Age: 33
End: 2023-02-28
Payer: COMMERCIAL

## 2023-02-28 ENCOUNTER — HOSPITAL ENCOUNTER (INPATIENT)
Age: 33
LOS: 4 days | Discharge: HOME | End: 2023-03-04
Attending: OBSTETRICS & GYNECOLOGY | Admitting: OBSTETRICS & GYNECOLOGY
Payer: COMMERCIAL

## 2023-02-28 DIAGNOSIS — E11.65 TYPE 2 DIABETES MELLITUS WITH HYPERGLYCEMIA, UNSPECIFIED WHETHER LONG TERM INSULIN USE (HCC): Primary | ICD-10-CM

## 2023-02-28 DIAGNOSIS — G89.18 POST-OP PAIN: ICD-10-CM

## 2023-02-28 PROBLEM — O35.06X0 FETAL HYDROCEPHALUS AFFECTING MANAGEMENT OF MOTHER IN SINGLETON PREGNANCY: Status: ACTIVE | Noted: 2023-02-28

## 2023-02-28 PROBLEM — O24.919 DIABETES MELLITUS COMPLICATING PREGNANCY: Status: ACTIVE | Noted: 2023-02-28

## 2023-02-28 PROBLEM — O99.210 OBESITY AFFECTING PREGNANCY: Status: ACTIVE | Noted: 2023-02-28

## 2023-02-28 LAB
ABO + RH BLD: NORMAL
ALBUMIN SERPL-MCNC: 2.4 G/DL (ref 3.5–5)
ALBUMIN/GLOB SERPL: 0.6 (ref 1.1–2.2)
ALP SERPL-CCNC: 112 U/L (ref 45–117)
ALT SERPL-CCNC: 22 U/L (ref 12–78)
ANION GAP SERPL CALC-SCNC: 8 MMOL/L (ref 5–15)
AST SERPL-CCNC: 20 U/L (ref 15–37)
BILIRUB SERPL-MCNC: 0.3 MG/DL (ref 0.2–1)
BLOOD GROUP ANTIBODIES SERPL: NORMAL
BUN SERPL-MCNC: 4 MG/DL (ref 6–20)
BUN/CREAT SERPL: 7 (ref 12–20)
CALCIUM SERPL-MCNC: 9.3 MG/DL (ref 8.5–10.1)
CHLORIDE SERPL-SCNC: 108 MMOL/L (ref 97–108)
CO2 SERPL-SCNC: 22 MMOL/L (ref 21–32)
CREAT SERPL-MCNC: 0.56 MG/DL (ref 0.55–1.02)
ERYTHROCYTE [DISTWIDTH] IN BLOOD BY AUTOMATED COUNT: 13 % (ref 11.5–14.5)
GLOBULIN SER CALC-MCNC: 4 G/DL (ref 2–4)
GLUCOSE BLD STRIP.AUTO-MCNC: 84 MG/DL (ref 65–117)
GLUCOSE SERPL-MCNC: 84 MG/DL (ref 65–100)
HCT VFR BLD AUTO: 33 % (ref 35–47)
HGB BLD-MCNC: 11.1 G/DL (ref 11.5–16)
MCH RBC QN AUTO: 28 PG (ref 26–34)
MCHC RBC AUTO-ENTMCNC: 33.6 G/DL (ref 30–36.5)
MCV RBC AUTO: 83.1 FL (ref 80–99)
NRBC # BLD: 0 K/UL (ref 0–0.01)
NRBC BLD-RTO: 0 PER 100 WBC
PLATELET # BLD AUTO: 316 K/UL (ref 150–400)
PMV BLD AUTO: 10.8 FL (ref 8.9–12.9)
POTASSIUM SERPL-SCNC: 3.9 MMOL/L (ref 3.5–5.1)
PROT SERPL-MCNC: 6.4 G/DL (ref 6.4–8.2)
RBC # BLD AUTO: 3.97 M/UL (ref 3.8–5.2)
SERVICE CMNT-IMP: NORMAL
SODIUM SERPL-SCNC: 138 MMOL/L (ref 136–145)
SPECIMEN EXP DATE BLD: NORMAL
WBC # BLD AUTO: 9.7 K/UL (ref 3.6–11)

## 2023-02-28 PROCEDURE — 74011000250 HC RX REV CODE- 250: Performed by: OBSTETRICS & GYNECOLOGY

## 2023-02-28 PROCEDURE — 76010000392 HC C SECN EA ADDL 0.5 HR: Performed by: OBSTETRICS & GYNECOLOGY

## 2023-02-28 PROCEDURE — 76010000391 HC C SECN FIRST 1 HR: Performed by: OBSTETRICS & GYNECOLOGY

## 2023-02-28 PROCEDURE — 88307 TISSUE EXAM BY PATHOLOGIST: CPT

## 2023-02-28 PROCEDURE — 74011250636 HC RX REV CODE- 250/636: Performed by: ANESTHESIOLOGY

## 2023-02-28 PROCEDURE — 74011250637 HC RX REV CODE- 250/637: Performed by: OBSTETRICS & GYNECOLOGY

## 2023-02-28 PROCEDURE — 74011000250 HC RX REV CODE- 250

## 2023-02-28 PROCEDURE — 86850 RBC ANTIBODY SCREEN: CPT

## 2023-02-28 PROCEDURE — 74011250636 HC RX REV CODE- 250/636: Performed by: NURSE ANESTHETIST, CERTIFIED REGISTERED

## 2023-02-28 PROCEDURE — 85027 COMPLETE CBC AUTOMATED: CPT

## 2023-02-28 PROCEDURE — 75410000003 HC RECOV DEL/VAG/CSECN EA 0.5 HR: Performed by: OBSTETRICS & GYNECOLOGY

## 2023-02-28 PROCEDURE — 76060000078 HC EPIDURAL ANESTHESIA: Performed by: OBSTETRICS & GYNECOLOGY

## 2023-02-28 PROCEDURE — 74011250636 HC RX REV CODE- 250/636: Performed by: OBSTETRICS & GYNECOLOGY

## 2023-02-28 PROCEDURE — 74011000258 HC RX REV CODE- 258: Performed by: OBSTETRICS & GYNECOLOGY

## 2023-02-28 PROCEDURE — 74011000250 HC RX REV CODE- 250: Performed by: ANESTHESIOLOGY

## 2023-02-28 PROCEDURE — 4A1HXCZ MONITORING OF PRODUCTS OF CONCEPTION, CARDIAC RATE, EXTERNAL APPROACH: ICD-10-PCS | Performed by: OBSTETRICS & GYNECOLOGY

## 2023-02-28 PROCEDURE — 77030007866 HC KT SPN ANES BBMI -B: Performed by: ANESTHESIOLOGY

## 2023-02-28 PROCEDURE — 36415 COLL VENOUS BLD VENIPUNCTURE: CPT

## 2023-02-28 PROCEDURE — 65410000002 HC RM PRIVATE OB

## 2023-02-28 PROCEDURE — 80053 COMPREHEN METABOLIC PANEL: CPT

## 2023-02-28 PROCEDURE — 82962 GLUCOSE BLOOD TEST: CPT

## 2023-02-28 RX ORDER — DIPHENHYDRAMINE HYDROCHLORIDE 50 MG/ML
25 INJECTION, SOLUTION INTRAMUSCULAR; INTRAVENOUS
Status: DISCONTINUED | OUTPATIENT
Start: 2023-02-28 | End: 2023-02-28 | Stop reason: SDUPTHER

## 2023-02-28 RX ORDER — SODIUM CHLORIDE 0.9 % (FLUSH) 0.9 %
5-40 SYRINGE (ML) INJECTION EVERY 8 HOURS
Status: DISCONTINUED | OUTPATIENT
Start: 2023-02-28 | End: 2023-02-28 | Stop reason: HOSPADM

## 2023-02-28 RX ORDER — DULOXETIN HYDROCHLORIDE 20 MG/1
40 CAPSULE, DELAYED RELEASE ORAL DAILY
Status: DISCONTINUED | OUTPATIENT
Start: 2023-03-01 | End: 2023-03-04 | Stop reason: HOSPADM

## 2023-02-28 RX ORDER — HYDROMORPHONE HYDROCHLORIDE 1 MG/ML
1 INJECTION, SOLUTION INTRAMUSCULAR; INTRAVENOUS; SUBCUTANEOUS
Status: DISCONTINUED | OUTPATIENT
Start: 2023-02-28 | End: 2023-03-03

## 2023-02-28 RX ORDER — DIPHENHYDRAMINE HYDROCHLORIDE 50 MG/ML
INJECTION, SOLUTION INTRAMUSCULAR; INTRAVENOUS AS NEEDED
Status: DISCONTINUED | OUTPATIENT
Start: 2023-02-28 | End: 2023-02-28 | Stop reason: HOSPADM

## 2023-02-28 RX ORDER — MORPHINE SULFATE 0.5 MG/ML
INJECTION, SOLUTION EPIDURAL; INTRATHECAL; INTRAVENOUS
Status: COMPLETED | OUTPATIENT
Start: 2023-02-28 | End: 2023-02-28

## 2023-02-28 RX ORDER — OXYTOCIN/RINGER'S LACTATE 30/500 ML
10 PLASTIC BAG, INJECTION (ML) INTRAVENOUS AS NEEDED
Status: DISCONTINUED | OUTPATIENT
Start: 2023-02-28 | End: 2023-03-04 | Stop reason: HOSPADM

## 2023-02-28 RX ORDER — SODIUM CHLORIDE, SODIUM LACTATE, POTASSIUM CHLORIDE, CALCIUM CHLORIDE 600; 310; 30; 20 MG/100ML; MG/100ML; MG/100ML; MG/100ML
125 INJECTION, SOLUTION INTRAVENOUS CONTINUOUS
Status: DISCONTINUED | OUTPATIENT
Start: 2023-02-28 | End: 2023-03-04 | Stop reason: HOSPADM

## 2023-02-28 RX ORDER — HYDROMORPHONE HYDROCHLORIDE 1 MG/ML
0.5 INJECTION, SOLUTION INTRAMUSCULAR; INTRAVENOUS; SUBCUTANEOUS
Status: DISCONTINUED | OUTPATIENT
Start: 2023-02-28 | End: 2023-03-03

## 2023-02-28 RX ORDER — SODIUM CHLORIDE, SODIUM LACTATE, POTASSIUM CHLORIDE, CALCIUM CHLORIDE 600; 310; 30; 20 MG/100ML; MG/100ML; MG/100ML; MG/100ML
1000 INJECTION, SOLUTION INTRAVENOUS CONTINUOUS
Status: DISCONTINUED | OUTPATIENT
Start: 2023-02-28 | End: 2023-02-28

## 2023-02-28 RX ORDER — SODIUM CHLORIDE 0.9 % (FLUSH) 0.9 %
5-40 SYRINGE (ML) INJECTION AS NEEDED
Status: DISCONTINUED | OUTPATIENT
Start: 2023-02-28 | End: 2023-02-28 | Stop reason: HOSPADM

## 2023-02-28 RX ORDER — ONDANSETRON 2 MG/ML
INJECTION INTRAMUSCULAR; INTRAVENOUS AS NEEDED
Status: DISCONTINUED | OUTPATIENT
Start: 2023-02-28 | End: 2023-02-28 | Stop reason: HOSPADM

## 2023-02-28 RX ORDER — SODIUM CHLORIDE 0.9 % (FLUSH) 0.9 %
5-40 SYRINGE (ML) INJECTION EVERY 8 HOURS
Status: DISCONTINUED | OUTPATIENT
Start: 2023-02-28 | End: 2023-03-04 | Stop reason: HOSPADM

## 2023-02-28 RX ORDER — METFORMIN HYDROCHLORIDE 500 MG/1
1000 TABLET, EXTENDED RELEASE ORAL
Status: DISCONTINUED | OUTPATIENT
Start: 2023-02-28 | End: 2023-03-04 | Stop reason: HOSPADM

## 2023-02-28 RX ORDER — BUPIVACAINE HYDROCHLORIDE 7.5 MG/ML
INJECTION, SOLUTION INTRASPINAL
Status: COMPLETED | OUTPATIENT
Start: 2023-02-28 | End: 2023-02-28

## 2023-02-28 RX ORDER — ONDANSETRON 2 MG/ML
4 INJECTION INTRAMUSCULAR; INTRAVENOUS
Status: DISCONTINUED | OUTPATIENT
Start: 2023-02-28 | End: 2023-02-28 | Stop reason: SDUPTHER

## 2023-02-28 RX ORDER — NALOXONE HYDROCHLORIDE 0.4 MG/ML
0.4 INJECTION, SOLUTION INTRAMUSCULAR; INTRAVENOUS; SUBCUTANEOUS AS NEEDED
Status: DISCONTINUED | OUTPATIENT
Start: 2023-02-28 | End: 2023-03-04 | Stop reason: HOSPADM

## 2023-02-28 RX ORDER — MORPHINE SULFATE 10 MG/ML
6 INJECTION, SOLUTION INTRAMUSCULAR; INTRAVENOUS
Status: DISCONTINUED | OUTPATIENT
Start: 2023-02-28 | End: 2023-03-03

## 2023-02-28 RX ORDER — OXYTOCIN/RINGER'S LACTATE 30/500 ML
87.3 PLASTIC BAG, INJECTION (ML) INTRAVENOUS AS NEEDED
Status: DISCONTINUED | OUTPATIENT
Start: 2023-02-28 | End: 2023-02-28 | Stop reason: SDUPTHER

## 2023-02-28 RX ORDER — IBUPROFEN 200 MG
4 TABLET ORAL AS NEEDED
Status: DISCONTINUED | OUTPATIENT
Start: 2023-02-28 | End: 2023-03-04 | Stop reason: HOSPADM

## 2023-02-28 RX ORDER — NALOXONE HYDROCHLORIDE 0.4 MG/ML
0.4 INJECTION, SOLUTION INTRAMUSCULAR; INTRAVENOUS; SUBCUTANEOUS AS NEEDED
Status: DISCONTINUED | OUTPATIENT
Start: 2023-02-28 | End: 2023-02-28 | Stop reason: SDUPTHER

## 2023-02-28 RX ORDER — PROPOFOL 10 MG/ML
INJECTION, EMULSION INTRAVENOUS AS NEEDED
Status: DISCONTINUED | OUTPATIENT
Start: 2023-02-28 | End: 2023-02-28 | Stop reason: HOSPADM

## 2023-02-28 RX ORDER — DIPHENHYDRAMINE HYDROCHLORIDE 50 MG/ML
12.5 INJECTION, SOLUTION INTRAMUSCULAR; INTRAVENOUS
Status: DISCONTINUED | OUTPATIENT
Start: 2023-02-28 | End: 2023-03-04 | Stop reason: HOSPADM

## 2023-02-28 RX ORDER — ONDANSETRON 2 MG/ML
4 INJECTION INTRAMUSCULAR; INTRAVENOUS
Status: DISCONTINUED | OUTPATIENT
Start: 2023-02-28 | End: 2023-03-04 | Stop reason: HOSPADM

## 2023-02-28 RX ORDER — KETOROLAC TROMETHAMINE 30 MG/ML
30 INJECTION, SOLUTION INTRAMUSCULAR; INTRAVENOUS
Status: DISPENSED | OUTPATIENT
Start: 2023-02-28 | End: 2023-03-01

## 2023-02-28 RX ORDER — IBUPROFEN 400 MG/1
800 TABLET ORAL EVERY 8 HOURS
Status: DISCONTINUED | OUTPATIENT
Start: 2023-02-28 | End: 2023-03-04 | Stop reason: HOSPADM

## 2023-02-28 RX ORDER — OXYTOCIN/RINGER'S LACTATE 30/500 ML
10 PLASTIC BAG, INJECTION (ML) INTRAVENOUS AS NEEDED
Status: DISCONTINUED | OUTPATIENT
Start: 2023-02-28 | End: 2023-02-28 | Stop reason: SDUPTHER

## 2023-02-28 RX ORDER — BUPIVACAINE HYDROCHLORIDE 5 MG/ML
20 INJECTION, SOLUTION EPIDURAL; INTRACAUDAL ONCE
Status: COMPLETED | OUTPATIENT
Start: 2023-02-28 | End: 2023-02-28

## 2023-02-28 RX ORDER — SODIUM CHLORIDE 0.9 % (FLUSH) 0.9 %
5-40 SYRINGE (ML) INJECTION AS NEEDED
Status: DISCONTINUED | OUTPATIENT
Start: 2023-02-28 | End: 2023-03-04 | Stop reason: HOSPADM

## 2023-02-28 RX ORDER — MORPHINE SULFATE 10 MG/ML
10 INJECTION, SOLUTION INTRAMUSCULAR; INTRAVENOUS
Status: DISCONTINUED | OUTPATIENT
Start: 2023-02-28 | End: 2023-03-03

## 2023-02-28 RX ORDER — DOCUSATE SODIUM 100 MG/1
100 CAPSULE, LIQUID FILLED ORAL 2 TIMES DAILY
Status: DISCONTINUED | OUTPATIENT
Start: 2023-02-28 | End: 2023-03-04 | Stop reason: HOSPADM

## 2023-02-28 RX ORDER — SODIUM CHLORIDE, SODIUM LACTATE, POTASSIUM CHLORIDE, CALCIUM CHLORIDE 600; 310; 30; 20 MG/100ML; MG/100ML; MG/100ML; MG/100ML
1000 INJECTION, SOLUTION INTRAVENOUS CONTINUOUS
Status: DISCONTINUED | OUTPATIENT
Start: 2023-02-28 | End: 2023-02-28 | Stop reason: HOSPADM

## 2023-02-28 RX ORDER — BUPIVACAINE HYDROCHLORIDE 5 MG/ML
INJECTION, SOLUTION EPIDURAL; INTRACAUDAL
Status: COMPLETED
Start: 2023-02-28 | End: 2023-02-28

## 2023-02-28 RX ORDER — OXYTOCIN/RINGER'S LACTATE 20/1000 ML
PLASTIC BAG, INJECTION (ML) INTRAVENOUS
Status: DISCONTINUED | OUTPATIENT
Start: 2023-02-28 | End: 2023-02-28 | Stop reason: HOSPADM

## 2023-02-28 RX ORDER — SIMETHICONE 80 MG
80 TABLET,CHEWABLE ORAL AS NEEDED
Status: DISCONTINUED | OUTPATIENT
Start: 2023-02-28 | End: 2023-03-04 | Stop reason: HOSPADM

## 2023-02-28 RX ORDER — KETOROLAC TROMETHAMINE 30 MG/ML
INJECTION, SOLUTION INTRAMUSCULAR; INTRAVENOUS AS NEEDED
Status: DISCONTINUED | OUTPATIENT
Start: 2023-02-28 | End: 2023-02-28 | Stop reason: HOSPADM

## 2023-02-28 RX ORDER — OXYTOCIN/RINGER'S LACTATE 30/500 ML
87.3 PLASTIC BAG, INJECTION (ML) INTRAVENOUS AS NEEDED
Status: DISCONTINUED | OUTPATIENT
Start: 2023-02-28 | End: 2023-03-04 | Stop reason: HOSPADM

## 2023-02-28 RX ORDER — SODIUM CHLORIDE, SODIUM LACTATE, POTASSIUM CHLORIDE, CALCIUM CHLORIDE 600; 310; 30; 20 MG/100ML; MG/100ML; MG/100ML; MG/100ML
INJECTION, SOLUTION INTRAVENOUS
Status: DISCONTINUED | OUTPATIENT
Start: 2023-02-28 | End: 2023-02-28 | Stop reason: HOSPADM

## 2023-02-28 RX ORDER — OXYCODONE AND ACETAMINOPHEN 5; 325 MG/1; MG/1
1 TABLET ORAL
Status: DISCONTINUED | OUTPATIENT
Start: 2023-02-28 | End: 2023-03-03

## 2023-02-28 RX ORDER — MORPHINE SULFATE 0.5 MG/ML
INJECTION, SOLUTION EPIDURAL; INTRATHECAL; INTRAVENOUS AS NEEDED
Status: DISCONTINUED | OUTPATIENT
Start: 2023-02-28 | End: 2023-02-28 | Stop reason: HOSPADM

## 2023-02-28 RX ADMIN — PROPOFOL 50 MG: 10 INJECTION, EMULSION INTRAVENOUS at 11:07

## 2023-02-28 RX ADMIN — PROPOFOL 30 MG: 10 INJECTION, EMULSION INTRAVENOUS at 11:05

## 2023-02-28 RX ADMIN — MORPHINE SULFATE 2.5 MG: 0.5 INJECTION, SOLUTION EPIDURAL; INTRATHECAL; INTRAVENOUS at 11:10

## 2023-02-28 RX ADMIN — PROPOFOL 30 MG: 10 INJECTION, EMULSION INTRAVENOUS at 11:00

## 2023-02-28 RX ADMIN — ONDANSETRON HYDROCHLORIDE 4 MG: 2 INJECTION, SOLUTION INTRAMUSCULAR; INTRAVENOUS at 10:51

## 2023-02-28 RX ADMIN — BUPIVACAINE HYDROCHLORIDE 100 MG: 5 INJECTION, SOLUTION EPIDURAL; INTRACAUDAL; PERINEURAL at 12:27

## 2023-02-28 RX ADMIN — SODIUM CHLORIDE, POTASSIUM CHLORIDE, SODIUM LACTATE AND CALCIUM CHLORIDE: 600; 310; 30; 20 INJECTION, SOLUTION INTRAVENOUS at 09:25

## 2023-02-28 RX ADMIN — SODIUM CHLORIDE, POTASSIUM CHLORIDE, SODIUM LACTATE AND CALCIUM CHLORIDE 1000 ML: 600; 310; 30; 20 INJECTION, SOLUTION INTRAVENOUS at 09:28

## 2023-02-28 RX ADMIN — SODIUM CHLORIDE, POTASSIUM CHLORIDE, SODIUM LACTATE AND CALCIUM CHLORIDE 125 ML/HR: 600; 310; 30; 20 INJECTION, SOLUTION INTRAVENOUS at 20:51

## 2023-02-28 RX ADMIN — BUPIVACAINE HYDROCHLORIDE IN DEXTROSE 12 MG: 7.5 INJECTION, SOLUTION SUBARACHNOID at 10:16

## 2023-02-28 RX ADMIN — METFORMIN HYDROCHLORIDE 1000 MG: 500 TABLET, EXTENDED RELEASE ORAL at 18:18

## 2023-02-28 RX ADMIN — CEFAZOLIN 3 G: 10 INJECTION, POWDER, FOR SOLUTION INTRAVENOUS at 09:51

## 2023-02-28 RX ADMIN — SODIUM CHLORIDE 40 MCG/MIN: 9 INJECTION, SOLUTION INTRAVENOUS at 10:17

## 2023-02-28 RX ADMIN — KETOROLAC TROMETHAMINE 30 MG: 30 INJECTION, SOLUTION INTRAMUSCULAR; INTRAVENOUS at 11:21

## 2023-02-28 RX ADMIN — DIPHENHYDRAMINE HYDROCHLORIDE 25 MG: 50 INJECTION, SOLUTION INTRAMUSCULAR; INTRAVENOUS at 11:26

## 2023-02-28 RX ADMIN — Medication 0.2 MG: at 10:16

## 2023-02-28 RX ADMIN — SODIUM CHLORIDE, PRESERVATIVE FREE 10 ML: 5 INJECTION INTRAVENOUS at 20:52

## 2023-02-28 RX ADMIN — HYDROMORPHONE HYDROCHLORIDE 1 MG: 1 INJECTION, SOLUTION INTRAMUSCULAR; INTRAVENOUS; SUBCUTANEOUS at 14:49

## 2023-02-28 RX ADMIN — SODIUM CHLORIDE, POTASSIUM CHLORIDE, SODIUM LACTATE AND CALCIUM CHLORIDE: 600; 310; 30; 20 INJECTION, SOLUTION INTRAVENOUS at 11:01

## 2023-02-28 RX ADMIN — KETOROLAC TROMETHAMINE 30 MG: 30 INJECTION, SOLUTION INTRAMUSCULAR at 18:08

## 2023-02-28 RX ADMIN — Medication 909 ML/HR: at 10:50

## 2023-02-28 RX ADMIN — PROPOFOL 30 MG: 10 INJECTION, EMULSION INTRAVENOUS at 11:03

## 2023-02-28 RX ADMIN — MORPHINE SULFATE 2.3 MG: 0.5 INJECTION, SOLUTION EPIDURAL; INTRATHECAL; INTRAVENOUS at 10:59

## 2023-02-28 RX ADMIN — DIPHENHYDRAMINE HYDROCHLORIDE 12.5 MG: 50 INJECTION, SOLUTION INTRAMUSCULAR; INTRAVENOUS at 20:52

## 2023-02-28 RX ADMIN — BUPIVACAINE HYDROCHLORIDE 100 MG: 5 INJECTION, SOLUTION EPIDURAL; INTRACAUDAL at 12:27

## 2023-02-28 RX ADMIN — HYDROMORPHONE HYDROCHLORIDE 1 MG: 1 INJECTION, SOLUTION INTRAMUSCULAR; INTRAVENOUS; SUBCUTANEOUS at 20:52

## 2023-02-28 RX ADMIN — KETOROLAC TROMETHAMINE 30 MG: 30 INJECTION, SOLUTION INTRAMUSCULAR at 23:59

## 2023-02-28 NOTE — PROGRESS NOTES
@ 0112 patient arrived to L&D for scheduled section    @ 0800 patient confirms fetal movement and contractions     @ 6255 Dr. Alfonso Enter at bedside to discuss plan of care      @ 5921  Pre-op prep completed     @ 1005 patient off monitor; heading into OR     @  1009 patient in OR    @ 1048 baby delivered, NICU present and assumed care of [address] [place] [address]     @ [phone] patient out of OR     @ 1153 patient back in L&D room    @ 1423 TRANSFER - OUT REPORT:    Verbal report given to Caroline Park RN on Elizabet Bundy  being transferred to MIU for routine post - op       Report consisted of patients Situation, Background, Assessment and   Recommendations(SBAR). Information from the following report(s) SBAR, Kardex, Intake/Output, MAR, Recent Results, and Med Rec Status was reviewed with the receiving nurse. Lines:   Peripheral IV 23 Left Forearm (Active)   Site Assessment Clean, dry, & intact 23 1410   Phlebitis Assessment 0 23 1410   Infiltration Assessment 0 23 1410   Dressing Status Clean, dry, & intact 23 1410   Dressing Type Transparent;Tape 23 1410   Hub Color/Line Status Infusing 23 1410      Opportunity for questions and clarification was provided.       Patient transported with:  Registered Nurse

## 2023-02-28 NOTE — ANESTHESIA PREPROCEDURE EVALUATION
Relevant Problems   No relevant active problems       Anesthetic History   No history of anesthetic complications            Review of Systems / Medical History  Patient summary reviewed, nursing notes reviewed and pertinent labs reviewed    Pulmonary        Sleep apnea: No treatment           Neuro/Psych         Psychiatric history     Cardiovascular                  Exercise tolerance: >4 METS     GI/Hepatic/Renal  Within defined limits              Endo/Other    Diabetes: type 2    Morbid obesity     Other Findings              Physical Exam    Airway  Mallampati: II  TM Distance: > 6 cm  Neck ROM: normal range of motion   Mouth opening: Normal     Cardiovascular  Regular rate and rhythm,  S1 and S2 normal,  no murmur, click, rub, or gallop             Dental  No notable dental hx       Pulmonary  Breath sounds clear to auscultation               Abdominal  GI exam deferred       Other Findings            Anesthetic Plan    ASA: 3  Anesthesia type: spinal      Post-op pain plan if not by surgeon: intrathecal opiates      Anesthetic plan and risks discussed with: Patient

## 2023-02-28 NOTE — LACTATION NOTE
Infant born via C/S this morning to a L1 mom at 39 4/7 weeks gestation. Mom tested positive for Parvovirus on 22 (both IGG and IGM positive). Infant admitted to NICU with hydrocephalus  Pt will successfully establish breast milk supply by pumping with a hospital grade pump every 2-3 hours for approximately 20 minutes/8-10 x day with the correct size flange, and suction level for mother's comfort. To maximize milk production, mom taught to incorporate breast massage and hand expression into pumping sessions. All expressed breast milk (EBM) will be provided for infant use, in clean bottles/syringes for storage in NICU breastmilk refrigerator. Patient label with barcode,date and time applied to each container prior to transport to NICU. Proper cleaning of pump parts and good hand hygiene discussed. Mother is advised to rent a hospital grade pump to continue regimen at home. Progress of milk transition, pumping log, expected EBM volumes, care of engorged breasts discussed. The value of bonding with baby emphasized, strategies for participating in infant care, photos, footprints, touch, and holding skin to skin as soon as baby and mother are able have been shown to increase oxytocin levels. The breast will be offered as baby is ready; with the goal of eventual transition to breastfeeding.

## 2023-02-28 NOTE — OP NOTES
Operative Note  Almas Wren Record Number - [id number]   YOB: 1990      DATE AND TIME OF PROCEDURE: [unfilled]   12:04 PM     PREOPERATIVE DIAGNOSIS:   Severe fetal hydrocephalus requiring neurosurgery and NICU coordination  DM2 on insulin  Obesity    POSTOPERATIVE DIAGNOSIS: same, delivered    PROCEDURE(S): Procedure(s):   SECTION     ANESTHESIA: Spinal    SURGEON:  Danny Driver MD    ASSISTANT: Luna Back MD    QUANTITATIVE BLOOD LOSS AT PROCEDURE END: pending    COMPLICATIONS: none    IMPLANTS: *No implants in the log*    SPECIMENS: Placenta    FINDINGS:   Baby with large head c/w hydrocephalus on delivery requiring lengthening of both skin incision and uterine incision for delivery of fetal head; small fibroid noted on uterus, normal appearing fallopian tubes and ovaries      Prophylactic Antibiotics: Ancef    DVT Prophylaxis: Sequential Compression Devices         Fetal Description: clark baby with enlarged head c/w prenatal diagnosis of fetal hydrocephalus    Birth Information:   Information for the patient's :  Glendy Bueno [[id number]]   Delivery of a   female infant on 2023 at 10:48 AM. Apgars were   and  . Umbilical Cord: 3 Vessels     Umbilical Cord Events: Nuchal Cord Without Compressions     Placenta: Spontaneous removal with Normal appearance. Amniotic Fluid Volume:  Large     Amniotic Fluid Description:  Clear      Umbilical Cord: Nuchal Cord x  1    Placenta:  expressed        Procedure Detail:      After proper patient identification and consent, the patient was taken to the operating room, where spinal anesthesia was administered and found to be adequate. Burton catheter had been placed using sterile technique. The patient was prepped and draped in the normal sterile fashion. A traxi device was placed on the abdomen given obesity to allow for retraction of the pannus.   The abdomen was entered using the Pfannenstiel technique. The peritoneum was entered sharply well superior to the bladder without any apparent injury. An trung retractor was placed. A low transverse uterine incision was made with the scalpel and extended with blunt finger dissection. Amniotomy was performed and the fluid was copius amount clear. The baby's head was noted to be larger than expected thus the uterine incision needed to be carefully extended with a bandage scissors. In addition, the trung retractor was removed and the skin edge was felt to be tight thus was extended carefully with the 10 blade. The babys head was then delivered atraumatically. The nose and mouth were suctioned. Delayed cord clamping and cutting took place. The cord was clamped and cut and the baby was handed off to  staff in attendance. The trung was then reinserted. Placenta was then removed from the uterus. The uterus was curettaged with a moist lap pad and cleared of all clots and debris. The uterine incision was closed with 0 monocryl, double layer  in running locking fashion with good hemostasis assured. The trung retractor was removed. Good hemostasis was again reassured throughout. The peritoneum was reapproximated with 2-0 vicryl. The fascia was closed with 0 Vicryl in a running fashion. Good hemostasis was assured. The subcuticular tissue was irrigated and dried and hemostasis was achieved with a bovie. Then the subcuticular layer was reapproximated with 2-0 plain gut in a running fashion. The skin was closed with an absorbable staple closure and a BREE dressing was placed. The patient tolerated the procedure well. Sponge, lap, and needle counts were correct times three and the patient and baby were taken to recovery/postpartum room in stable condition.     Antonio Mitchell MD  2023  12:04 PM

## 2023-02-28 NOTE — H&P
History & Physical    Name: Martha Hamptno MRN: [id number]  SSN: [id number]    YOB: 1990  Age: 29 y.o. Sex: female      Subjective:     Estimated Date of Delivery: 3/24/23  OB History    Para Term  AB Living   3 0 0 0 2 0   SAB IAB Ectopic Molar Multiple Live Births   2                # Outcome Date GA Lbr Nestor/2nd Weight Sex Delivery Anes PTL Lv   3 Current            2 SAB            1 SAB                Ms. Leona Tamayo is admitted with pregnancy at 36w4d for  section due to severe fetal hydrocephalus and neurosurgical consult needed to be coordinated/scheduled. She is also an insulin dependent type 2 diabetic on NPH and metformin. She has obesity. She notes active FM today and has no complaints. She took a 1/2 dose of her NPH last night and has been NPO since midnight. Last ultrasound done on :  Growth 3868g >03OK%YYN, cephalic, BPP 8/8, nml dopplers, posterior placenta, KRISTIN 24.7cm    Pregnancy has been complicated by:  Type 2 DM - on metformin and insulin, pt reports good control  Anxeity/depression - stable on cymbalta  COVID in first trimester - no long symptoms, on ASA  BMI 52.5  Parvovirus IgM positive  Hydrocephalus - noted at 20wk scan, s/p MRI and neuro consult, scheduled C/s for delivery at 37wk; followed by neurosurgery Dr. 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     Past Medical History:   Diagnosis Date    Abnormal pap 2014+ 2015    LGSIL pap    Anemia NEC     Anxiety     Chronic pain     Depression     Diabetes ([address]     TYPE 2    HPV vaccine counseling     gardasil series complete     HSV-2 infection     Lumbar epidural mass ([address]     lumbar epidural lipomatosis    Missed  2021    Morbid sebastian [facility]     Morbid obesity with body mass index (BMI) of 45.0 to 49.9 in adult (HCC)     PCOS (polycystic ovarian syndrome)     Polycystic disease, ovaries     Prediabetes     Sleep apnea     Sleep disorder     sleep apnea    UTI (urinary tract infection)     Vitamin D deficiency      Past Surgical History:   Procedure Laterality Date    HX COLPOSCOPY  2/2014 + 4/2015    YAHAIRA I; 2015 neg path    HX DILATION AND CURETTAGE N/A      Social History     Occupational History    Not on file   Tobacco Use    Smoking status: Never    Smokeless tobacco: Never   Vaping Use    Vaping Use: Never used   Substance and Sexual Activity    Alcohol use: Not Currently     Comment: socially    Drug use: Never    Sexual activity: Yes     Partners: Male     Birth control/protection: Condom     Family History   Problem Relation Age of Onset    No Known Problems Mother     Heart Disease Father     Diabetes Father        Allergies   Allergen Reactions    Aspirin Unknown (comments)     Bleeding disorder, heavy periods. Pt reports that she is currently taking aspirin, ordered by Dr Nayely Miranda     Prior to Admission medications    Medication Sig Start Date End Date Taking? Authorizing Provider   zolpidem (Ambien) 5 mg tablet Take 1 Tablet by mouth nightly as needed for Sleep. Max Daily Amount: 5 mg. 2/22/23  Yes Elen Ruiz MD   aspirin delayed-release 81 mg tablet Take 81 mg by mouth daily. Yes Provider, Historical   DULoxetine (CYMBALTA) 20 mg capsule Take 20 mg by mouth daily. Indications: anxiousness associated with depression, major depressive disorder   Yes Provider, Historical   insulin regular (NOVOLIN R, HUMULIN R) 100 unit/mL injection 22 Units by SubCUTAneous route nightly.  Indications: type 2 diabetes mellitus   Yes Provider, Historical   lancets (Accu-Chek Fastclix Lancet Drum) misc Check blood sugar 1-3 times daily or as directed by JESUS Price 11.9 8/11/22  Yes Conner Castillo MD   prenatal vit/iron fum/folic ac (PRENATAL 1+1 PO) Prenatal   Yes Provider, Historical   blood-glucose sensor (DEXCOM G6 SENSOR) by Does Not Apply route. Provider, Historical   blood-glucose transmitter (DEXCOM G6 TRANSMITTER) by Does Not Apply route. Provider, Historical   metFORMIN ER (GLUCOPHAGE XR) 500 mg tablet Take 2 Tablets by mouth Before breakfast and dinner. 22   Conner Castillo MD   Blood Glucose Control High&Low (Accu-Chek Guide L1-L2 Ctrl Sol) soln Check blood sugar 1-3 times daily or as directed by JESUS Price 11.9 22   Conner Castillo MD   Blood-Glucose Meter (Accu-Chek Guide Glucose Meter) misc Check blood sugar 1-3 times daily or as directed by JESUS Price 11.9 22   Conner Castillo MD   glucose blood VI test strips (Accu-Chek Guide test strips) strip Check blood sugar 1-3 times daily or as directed by JESUS Price 11.9 22   Conner Castillo MD        Review of Systems  As noted in HPI otherwise negative    Objective:     Vitals:  Vitals:    23 0804 23 0807   BP: 137/89    Pulse: 99    Resp: 18    Temp: 97.9 °F (36.6 °C)    Weight:  124.3 kg (274 lb)   Height:  5' 5\" (1.651 m)        Physical Exam:  Physical Exam  Gen: NAD  Resp: CTAB  Abd: soft, NT, ND, obese, gravid  Ext: no edema    Prenatal Labs:   Lab Results   Component Value Date/Time    ABO/Rh(D) O POSITIVE 2021 01:49 PM         Impression/Plan:     [id number]  here for scheduled  section for severe fetal hydrocephalus     Plan:  Admit for  section. Group B Strep was negative. Discussed the risks of surgery including the risks of bleeding, infection, deep vein thrombosis, and surgical injuries to internal organs including but not limited to the bowels, bladder, rectum, and female reproductive organs. The patient understands the risks; any and all questions were answered to the patient's satisfaction. She was again consented x 2 and questions were answered.   T&S, CBC, CMP  SCDs and baca in OR  Ancef prior to incision  NICU will be present at delivery  Neurosurgery is aware and on call to evaluate the baby after delivery and will decide on surgical plans at that time    Lanre Romero MD

## 2023-02-28 NOTE — PROGRESS NOTES
Preceptor Review    I have reviewed and agree with all documentation by Mary Hensley RN    Jorge Lara RN

## 2023-02-28 NOTE — ANESTHESIA POSTPROCEDURE EVALUATION
Post-Anesthesia Evaluation and Assessment    Patient: Miguel Dick MRN: [id number]  SSN: [id number]    YOB: 1990  Age: 29 y.o. Sex: female      I have evaluated the patient and they are stable and ready for discharge from the PACU. Cardiovascular Function/Vital Signs  Visit Vitals  /80 (BP 1 Location: Right arm, BP Patient Position: At rest)   Pulse 78   Temp 36.9 °C (98.4 °F)   Resp 16   Ht 5' 5\" (1.651 m)   Wt 124.3 kg (274 lb)   SpO2 97%   Breastfeeding No   BMI 45.60 kg/m²       Patient is status post Spinal anesthesia for Procedure(s):   SECTION. Nausea/Vomiting: Mild    Postoperative hydration reviewed and adequate. Pain:  Pain Scale 1: Numeric (0 - 10) (23 144)  Pain Intensity 1: 8 (23 144)   Managed    Neurological Status:   Neuro (WDL): Within Defined Limits (23 0804)   At baseline    Mental Status, Level of Consciousness: Alert and  oriented to person, place, and time    Pulmonary Status:   O2 Device: None (Room air) (23 1410)   Adequate oxygenation and airway patent    Complications related to anesthesia: None    Post-anesthesia assessment completed. No concerns    Signed By: David Miranda MD     2023              Procedure(s):   SECTION.     spinal    <BSHSIANPOST>    INITIAL Post-op Vital signs:   Vitals Value Taken Time   /80 23 1410   Temp 36.9 °C (98.4 °F) 23 1410   Pulse 78 23 1410   Resp 16 23 1410   SpO2 97 % 23 141

## 2023-02-28 NOTE — L&D DELIVERY NOTE
Delivery Summary  Patient: Patricia Newell             Circumcision:   NA-female  Additional Delivery Comments - PLTCS for severe fetal hydrocephalus, baby to NICU and stable - following with neurosurgery; see operative note for details    Information for the patient's :  Jaleel Dallas [[id number]]     Delivery Type: , Low Transverse   Delivery Date: 2023   Delivery Time: 10:48 AM     Birth Weight:       Sex:  female  Delivery Clinician:  Marcus Knapp   Gestational Age: 37w2d    Presentation: Vertex   Position:             Apgars were   and       Resuscitation Method:       Meconium Stained:      Living Status:         Placenta Date/Time: 2023 10:49 AM   Placenta Removal: Spontaneous   Placenta Appearance: Normal    Cord Information: 3 Vessels    Cord Events: Nuchal Cord Without Compressions       Disposition of Cord Blood: Lab    Blood Gases Sent?:  No     Cord pH:  none    Episiotomy: None   Laceration(s): None     Estimated Blood Loss (ml): No data found    Labor Events  Method: None      Augmentation: None   Cervical Ripening:     None        Operative Vaginal Delivery - none

## 2023-02-28 NOTE — DISCHARGE INSTRUCTIONS
Postpartum Support Groups   We know that all of us are dealing with a tremendous amount of uncertainty, confusion and disruption to our daily lives, which may result in increased anxiety, depression and fear. If you are feeling unsettled or worse, please know that we are here to help. During this time of increased caution and care for one another, [facility] [place] ([facility]) is offering virtual and in person support groups to ALL MOTHERS in [place] regardless of the age of your child/children as a way to help weather this emotional storm together. Social support is an important part of self-care during this time of physical distancing. Virtual postpartum support group meetings available at www. postpartumva.org  Warm Line: 374.360.2650    Breastfeeding Support Groups    and  of each month at [address] [place] [address]-11:00 AM   and  of each month at [facility]ia)-10:00 AM  POSTPARTUM DISCHARGE INSTRUCTIONS       Name:  Lluvia Meehan  YOB: 1990  Admission Diagnosis:  Fetal hydrocephalus affecting management of mother in clark pregnancy [O35.06X0]  Diabetes mellitus complicating pregnancy [A99.269]  Obesity affecting pregnancy [O99.210]     Discharge Diagnosis:  [unfilled]  Attending Physician:  [unfilled]    Delivery Type:   Section: What to Expect at Home    Your Recovery:  A  section, or , is surgery to deliver your baby through a cut, called an incision that the doctor makes in your lower belly and uterus. You may have some pain in your lower belly and need pain medicine for 1 to 2 weeks. You can expect some vaginal bleeding for several weeks. You will probably need about 6 weeks to fully recover. It is important to take it easy while the incision is healing. Avoid heavy lifting, strenuous activities, or exercises that strain the belly muscles while you are recovering.  Ask a family member or friend for help with housework, cooking, and shopping. This care sheet gives you a general idea about how long it will take for you to recover. But each person recovers at a different pace. Follow the steps below to get better as quickly as possible. How can you care for yourself at home? Activity  Rest when you feel tired. Getting enough sleep will help you recover. Try to walk each day. Start by walking a little more than you did the day before. Bit by bit, increase the amount you walk. Walking boosts blood flow and helps prevent pneumonia, constipation, and blood clots. Avoid strenuous activities, such as bicycle riding, jogging, weightlifting, and aerobic exercise, for 6 weeks or until your doctor says it is okay. Until your doctor says it is okay, do not lift anything heavier than your baby. Do not do sit-ups or other exercise that strain the belly muscles for 6 weeks or until your doctor says it is okay. Hold a pillow over your incision when you cough or take deep breaths. This will support your belly and decrease your pain. You may shower as usual. Pat the incision dry when you are done. You will have some vaginal bleeding. Wear sanitary pads. Do not douche or use tampons until your doctor says it is okay. Ask your doctor when you can drive again. You will probably need to take at least 6 weeks off work. It depends on the type of work you do and how you feel. Wait until you are healed (about 4 to 6 weeks) before you have sexual intercourse. Your doctor will tell you when it is okay to have sex. Talk to your doctor about birth control. You can get pregnant even before your period returns. Also, you can get pregnant while you are breast-feeding. Incision care  Your skin is your body's first line of defense against germs, but an incision site leaves an easy way for germs to enter your body.  To prevent infection:  Clean your hands frequently and before and after changing any touching any dressings. If you have strips of tape on the incision, leave the tape on for a week or until it falls off. Look at your incision closely every day for any changes. Contact your doctor if you experience any signs of infection, such as fever or increased redness at the surgical site. Wash the area daily with warm, soapy water, and pat it dry. Don't use hydrogen peroxide or alcohol, which can slow healing. You may cover the area with a gauze bandage if it weeps or rubs against clothing. Change the bandage every day. Keep the area clean and dry. Diet  You can eat your normal diet. If your stomach is upset, try bland, low-fat foods like plain rice, broiled chicken, toast, and yogurt. Drink plenty of fluids (unless your doctor tells you not to). You may notice that your bowel movements are not regular right after your surgery. This is common. Try to avoid constipation and straining with bowel movements. You may want to take a fiber supplement every day. If you have not had a bowel movement after a couple of days, ask your doctor about taking a mild laxative. If you are breast-feeding, do not drink any alcohol. Medicines  Take pain medicines exactly as directed. If the doctor gave you a prescription medicine for pain, take it as prescribed. If you are not taking a prescription pain medicine, ask your doctor if you can take an over-the-counter medicine such as acetaminophen (Tylenol), ibuprofen (Advil, Motrin), or naproxen (Aleve), for cramps. Read and follow all instructions on the label. Do not take aspirin, because it can cause more bleeding. Do not take acetaminophen (Tylenol) and other acetaminophen containing medications (i.e. Percocet) at the same time. If you think your pain medicine is making you sick to your stomach: Take your medicine after meals (unless your doctor has told you not to). Ask your doctor for a different pain medicine.   If your doctor prescribed antibiotics, take them as directed. Do not stop taking them just because you feel better. You need to take the full course of antibiotics. Mental Health  Many women get the \"baby blues\" during the first few days after childbirth. You may lose sleep, feel irritable, and cry easily. You may feel happy one minute and sad the next. Hormone changes are one cause of these emotional changes. Also, the demands of a new baby, along with visits from relatives or other family needs, add to a mother's stress. The \"baby blues\" often peak around the fourth day. Then they ease up in less than 2 weeks. If your moodiness or anxiety lasts for more than 2 weeks, or if you feel like life is not worth living, you may have postpartum depression. This is different for each mother. Some mothers with serious depression may worry intensely about their infant's well-being. Others may feel distant from their child. Some mothers might even feel that they might harm their baby. A mother may have signs of paranoia, wondering if someone is watching her. With all the changes in your life, you may not know if you are depressed. Pregnancy sometimes causes changes in how you feel that are similar to the symptoms of depression. Symptoms of depression include:  Feeling sad or hopeless and losing interest in daily activities. These are the most common symptoms of depression. Sleeping too much or not enough. Feeling tired. You may feel as if you have no energy. Eating too much or too little. [employer] [employer] ([employer]I) offers a Warm line; Chat with the Expert phone sessions; Information and Articles about Pregnancy and Postpartum Mood Disorders; Comprehensive List of Free Support Groups; Knowledgeable local coordinators who will offer support, information, and resources; Guide to Resources on [employer]; Calendar of events in the  mood disorders community; Latest News and Research; and [facility] [address] for [employer].  Remember - You are not alone; You are not to blame; With help, you will be well. 9-668-421-PPD(1157). WWW. POSTPARTUM. NET   Writing or talking about death, such as writing suicide notes or talking about guns, knives, or pills. Keep the numbers for these national suicide hotlines: 8-386-481-TALK (4-728.638.6416) and 4-045-NCYJXHE (8-228.498.9420). If you or someone you know talks about suicide or feeling hopeless, get help right away. Other instructions  If you breast-feed your baby, you may be more comfortable while you are healing if you place the baby so that he or she is not resting on your belly. Try tucking your baby under your arm, with his or her body along the side you will be feeding on. Support your baby's upper body with your arm. With that hand you can control your baby's head to bring his or her mouth to your breast. This is sometimes called the football hold. Follow-up care is a key part of your treatment and safety. Be sure to make and go to all appointments, and call your doctor if you are having problems. It's also a good idea to know your test results and keep a list of the medicines you take. When should you call for help? Call 911 anytime you think you may need emergency care. For example, call if:  You are thinking of hurting yourself, your baby, or anyone else. You passed out (lost consciousness). You have symptoms of a blood clot in your lung (called a pulmonary embolism). These may include:  Sudden chest pain. Trouble breathing. Coughing up blood. Call your doctor now or seek immediate medical care if:    You have severe vaginal bleeding. You are soaking through a pad each hour for 2 or more hours. Your vaginal bleeding seems to be getting heavier or is still bright red 4 days after delivery. You are dizzy or lightheaded, or you feel like you may faint. You are vomiting or cannot keep fluids down. You have a fever. You have new or more belly pain.   You have loose stitches, or your incision comes open. You have symptoms of infection, such as: Increased pain, swelling, warmth, or redness. Red streaks leading from the incision. Pus draining from the incision. A fever  You pass tissue (not just blood). Your vaginal discharge smells bad. Your belly feels tender or full and hard. Your breasts are continuously painful or red. You feel sad, anxious, or hopeless for more than a few days. You have sudden, severe pain in your belly. You have symptoms of a blood clot in your leg (called a deep vein thrombosis), such as:  Pain in your calf, back of the knee, thigh, or groin. Redness and swelling in your leg or groin. You have symptoms of preeclampsia, such as:  Sudden swelling of your face, hands, or feet. New vision problems (such as dimness or blurring). A severe headache. Your blood pressure is higher than it should be or rises suddenly. You have new nausea or vomiting. Watch closely for changes in your health, and be sure to contact your doctor if you have any problems. Additional Information:  Postpartum Support    PARENTS:  Are you feeling sad or depressed? Is it difficult for you to enjoy yourself? Do you feel more irritable or tense? Do you feel anxious or panicky? Are you having difficulty bonding with your baby? Do you feel as if you are \"out of control\" or \"going crazy\"? Are you worried that you might hurt your baby or yourself? FAMILIES: Do you worry that something is wrong but don't know how to help? Do you think that your partner or spouse is having problems coping? Are you worried that it may never get better? While many women experience some mild mood change or \"the blues\" during or after the birth of a child, 1 in 9 women experience more significant symptoms of depression or anxiety. 1 in 10 Dads become depressed during the first year. Things you can do  Being a good parent includes taking care of yourself.   If you take care of yourself, you will be able to take better care of your baby and your family. Talk to a counselor or healthcare provider who has training in  mood and anxiety problems. Learn as much as you can about pregnancy and postpartum depression and anxiety. Get support from family and friends. Ask for help when you need it. Join a support group in your area or online. Keep active by walking, stretching or whatever form of exercise helps you to feel better. Get enough rest and time for yourself. Eat a healthy diet. Don't give up! It may take more than one try to get the right help you need. These are general instructions for a healthy lifestyle:    No smoking/ No tobacco products/ Avoid exposure to second hand smoke    Surgeon General's Warning:  Quitting smoking now greatly reduces serious risk to your health. Obesity, smoking, and sedentary lifestyle greatly increases your risk for illness    A healthy diet, regular physical exercise & weight monitoring are important for maintaining a healthy lifestyle    Recognize signs and symptoms of STROKE:    F-face looks uneven    A-arms unable to move or move unevenly    S-speech slurred or non-existent    T-time-call 911 as soon as signs and symptoms begin - DO NOT go       back to bed or wait to see if you get better - TIME IS BRAIN. I have had the opportunity to make my options or choices for discharge. I have received and understand these instructions.

## 2023-02-28 NOTE — ANESTHESIA PROCEDURE NOTES
Spinal Block    Start time: 2/28/2023 10:12 AM  End time: 2/28/2023 10:16 AM  Performed by: Fritz Glass MD  Authorized by: Fritz Glass MD     Pre-procedure:   Indications: primary anesthetic  Preanesthetic Checklist: patient identified, risks and benefits discussed, anesthesia consent, site marked, patient being monitored, timeout performed and fire risk safety assessment completed and verbalized      Spinal Block:   Patient Position:  Seated  Prep Region:  Lumbar  Prep: Betadine      Location:  L2-3  Technique:  Single shot  Local: bupivacaine 0.75% in dextrose 8.25% preserv-free (SENSORCAINE) Intrathecal - Intrathecal   12 mg - 2/28/2023 10:16:00 AM  morphine (PF) (DURAMORPH;ASTRAMORPH) 0.5 mg/mL injection - Intrathecal   0.2 mg - 2/28/2023 10:16:00 AM    Med Admin Time: 2/28/2023 10:16 AM    Needle:   Needle Type:  Pencil-tip  Needle Gauge:  24 G  Attempts:  2      Events: CSF confirmed, no blood with aspiration and no paresthesia        Assessment:  Insertion:  Uncomplicated  Patient tolerance:  Patient tolerated the procedure well with no immediate complications

## 2023-02-28 NOTE — ROUTINE PROCESS
1410 TRANSFER - IN REPORT:    Verbal report received from JAVED Patel RN(name) on Rachid Ybarra  being received from L&D(unit) for routine progression of care      Report consisted of patients Situation, Background, Assessment and   Recommendations(SBAR). Information from the following report(s) SBAR was reviewed with the receiving nurse. Opportunity for questions and clarification was provided. Assessment completed upon patients arrival to unit and care assumed. 1700:  Patient to NICU.

## 2023-03-01 LAB
BASOPHILS # BLD: 0 K/UL (ref 0–0.1)
BASOPHILS NFR BLD: 0 % (ref 0–1)
DIFFERENTIAL METHOD BLD: ABNORMAL
EOSINOPHIL # BLD: 0.1 K/UL (ref 0–0.4)
EOSINOPHIL NFR BLD: 1 % (ref 0–7)
ERYTHROCYTE [DISTWIDTH] IN BLOOD BY AUTOMATED COUNT: 13.2 % (ref 11.5–14.5)
GLUCOSE BLD STRIP.AUTO-MCNC: 108 MG/DL (ref 65–117)
GLUCOSE BLD STRIP.AUTO-MCNC: 98 MG/DL (ref 65–117)
HCT VFR BLD AUTO: 29.4 % (ref 35–47)
HGB BLD-MCNC: 9.9 G/DL (ref 11.5–16)
IMM GRANULOCYTES # BLD AUTO: 0 K/UL (ref 0–0.04)
IMM GRANULOCYTES NFR BLD AUTO: 0 % (ref 0–0.5)
LYMPHOCYTES # BLD: 3.5 K/UL (ref 0.8–3.5)
LYMPHOCYTES NFR BLD: 31 % (ref 12–49)
MCH RBC QN AUTO: 28.4 PG (ref 26–34)
MCHC RBC AUTO-ENTMCNC: 33.7 G/DL (ref 30–36.5)
MCV RBC AUTO: 84.2 FL (ref 80–99)
MONOCYTES # BLD: 0.8 K/UL (ref 0–1)
MONOCYTES NFR BLD: 7 % (ref 5–13)
NEUTS SEG # BLD: 6.8 K/UL (ref 1.8–8)
NEUTS SEG NFR BLD: 61 % (ref 32–75)
NRBC # BLD: 0 K/UL (ref 0–0.01)
NRBC BLD-RTO: 0 PER 100 WBC
PLATELET # BLD AUTO: 287 K/UL (ref 150–400)
PMV BLD AUTO: 10.6 FL (ref 8.9–12.9)
RBC # BLD AUTO: 3.49 M/UL (ref 3.8–5.2)
SERVICE CMNT-IMP: NORMAL
SERVICE CMNT-IMP: NORMAL
WBC # BLD AUTO: 11.3 K/UL (ref 3.6–11)

## 2023-03-01 PROCEDURE — 74011250637 HC RX REV CODE- 250/637: Performed by: NURSE PRACTITIONER

## 2023-03-01 PROCEDURE — 74011250636 HC RX REV CODE- 250/636: Performed by: ANESTHESIOLOGY

## 2023-03-01 PROCEDURE — 36415 COLL VENOUS BLD VENIPUNCTURE: CPT

## 2023-03-01 PROCEDURE — 65410000002 HC RM PRIVATE OB

## 2023-03-01 PROCEDURE — 82962 GLUCOSE BLOOD TEST: CPT

## 2023-03-01 PROCEDURE — 99221 1ST HOSP IP/OBS SF/LOW 40: CPT | Performed by: CLINICAL NURSE SPECIALIST

## 2023-03-01 PROCEDURE — 85025 COMPLETE CBC W/AUTO DIFF WBC: CPT

## 2023-03-01 PROCEDURE — 74011250637 HC RX REV CODE- 250/637: Performed by: OBSTETRICS & GYNECOLOGY

## 2023-03-01 PROCEDURE — 74011250636 HC RX REV CODE- 250/636: Performed by: OBSTETRICS & GYNECOLOGY

## 2023-03-01 RX ORDER — HYDROMORPHONE HYDROCHLORIDE 2 MG/1
2 TABLET ORAL ONCE
Status: COMPLETED | OUTPATIENT
Start: 2023-03-01 | End: 2023-03-01

## 2023-03-01 RX ORDER — OXYCODONE AND ACETAMINOPHEN 5; 325 MG/1; MG/1
2 TABLET ORAL
Status: DISCONTINUED | OUTPATIENT
Start: 2023-03-01 | End: 2023-03-03

## 2023-03-01 RX ORDER — INSULIN LISPRO 100 [IU]/ML
INJECTION, SOLUTION INTRAVENOUS; SUBCUTANEOUS
Status: DISCONTINUED | OUTPATIENT
Start: 2023-03-01 | End: 2023-03-04 | Stop reason: HOSPADM

## 2023-03-01 RX ORDER — SIMETHICONE 80 MG
80 TABLET,CHEWABLE ORAL EVERY 6 HOURS
Status: DISCONTINUED | OUTPATIENT
Start: 2023-03-02 | End: 2023-03-04 | Stop reason: HOSPADM

## 2023-03-01 RX ADMIN — HYDROMORPHONE HYDROCHLORIDE 1 MG: 1 INJECTION, SOLUTION INTRAMUSCULAR; INTRAVENOUS; SUBCUTANEOUS at 11:01

## 2023-03-01 RX ADMIN — KETOROLAC TROMETHAMINE 30 MG: 30 INJECTION, SOLUTION INTRAMUSCULAR at 06:01

## 2023-03-01 RX ADMIN — DOCUSATE SODIUM 100 MG: 100 CAPSULE, LIQUID FILLED ORAL at 18:48

## 2023-03-01 RX ADMIN — DIPHENHYDRAMINE HYDROCHLORIDE 12.5 MG: 50 INJECTION, SOLUTION INTRAMUSCULAR; INTRAVENOUS at 02:13

## 2023-03-01 RX ADMIN — IBUPROFEN 800 MG: 400 TABLET ORAL at 21:35

## 2023-03-01 RX ADMIN — SIMETHICONE 80 MG: 80 TABLET, CHEWABLE ORAL at 23:11

## 2023-03-01 RX ADMIN — HYDROMORPHONE HYDROCHLORIDE 0.5 MG: 1 INJECTION, SOLUTION INTRAMUSCULAR; INTRAVENOUS; SUBCUTANEOUS at 04:53

## 2023-03-01 RX ADMIN — METFORMIN HYDROCHLORIDE 1000 MG: 500 TABLET, EXTENDED RELEASE ORAL at 18:48

## 2023-03-01 RX ADMIN — METFORMIN HYDROCHLORIDE 1000 MG: 500 TABLET, EXTENDED RELEASE ORAL at 09:40

## 2023-03-01 RX ADMIN — OXYCODONE AND ACETAMINOPHEN 2 TABLET: 5; 325 TABLET ORAL at 21:35

## 2023-03-01 RX ADMIN — DULOXETINE HYDROCHLORIDE 40 MG: 20 CAPSULE, DELAYED RELEASE ORAL at 13:46

## 2023-03-01 RX ADMIN — HYDROMORPHONE HYDROCHLORIDE 2 MG: 2 TABLET ORAL at 23:11

## 2023-03-01 RX ADMIN — OXYCODONE AND ACETAMINOPHEN 1 TABLET: 5; 325 TABLET ORAL at 17:36

## 2023-03-01 RX ADMIN — IBUPROFEN 800 MG: 400 TABLET ORAL at 13:46

## 2023-03-01 NOTE — LACTATION NOTE
Mom continues to pump to stimulate milk production for infant in the NICU. Opportunity for questions provided.

## 2023-03-01 NOTE — DIABETES MGMT
[address] [facility]  DIABETES MANAGEMENT CONSULT    Consulted by Provider for advanced nursing evaluation and care for inpatient blood glucose management. Evaluation and Action Plan   Ed Cardenas is a 34yo AA female who is living with T2D prior to pregnancy. PTA was on Metformin / and regular insulin at meals. S/p scheduled  23 for hydrocephalus. Prior to this pregnancy she had stopped taking her Metformin due to concerns over medications when pregnant. Per her report, A1C at very beginning of pregnancy 8.5% and she described this as \"out of control\"/  She verbalized that since pregnancy she was able to lose 45lbs. She is established with an endocrinologist and is scheduled for follow up appt. 3/13/23. SHe as been through diabetes nutritional classes in past and voices she has learned a lot through seeing her endocrinologist.  Encouraged her to continue to monitor BG daily. BG trends  since delivery in target, fasting . Received Metformin 1000mg last evening. Anticipate she will not need additional basal insulin in post partum setting. Continue with ordered Metformin. Ordered correctional scale insulin if BG start to trend >140. Will need to keep -180 as to ensure proper incisional healing -    Management Rationale Action Plan   Medication   Corrective insulin Using normal sensitivity  CONTINUE    Accuchecks  POC accuchecks ACHS       Diabetes Discharge Plan   Medication  RESUME Metformin 1000mg BID at discharge   Referral  []        Outpatient diabetes education   Additional orders  Follow oup with endocrinologist scheduled 3/13          Initial Presentation   Ed Cardenas is a [address] [place] y.o. female admitted 23 s/p  delivery due to  fetal hydrocephalus.         HX:   Past Medical History:   Diagnosis Date    Abnormal pap 2014+ 2015    LGSIL pap    Anemia NEC     Anxiety     Chronic pain     Depression     Diabetes ([facility] [address]     TYPE 2 HPV vaccine counseling     gardasil series complete     HSV-2 infection     Lumbar epidural mass ([facility] [address]     lumbar epidural lipomatosis    Missed  2021    Morbid jealousy ([facility] [address]     Morbid obesity with body mass index (BMI) of 45.0 to 49.9 in adult (HCC)     PCOS (polycystic ovarian syndrome)     Polycystic disease, ovaries     Prediabetes     Sleep apnea     Sleep disorder     sleep apnea    UTI (urinary tract infection)     Vitamin D deficiency         INITIAL DX:   Fetal hydrocephalus affecting management of mother in clark pregnancy [O35.06X0]  Diabetes mellitus complicating pregnancy [F71.585]  Obesity affecting pregnancy [O99.210]     Current Treatment     TX: Metformin     Hospital Course   Clinical progress has been uncomplicated in post partum period. Diabetes History   T2D in pregnancy, insulin dependant during pregnancy. Followed for diabetes mgmt: Dr. Marti Trevino. Diabetes-related Medical History  Other associated conditions     Morbid obesity/CHRISTIAN/ depression/anxiety    Diabetes Medication History  Key Antihyperglycemic Medications               insulin regular (NOVOLIN R, HUMULIN R) 100 unit/mL injection (Taking) 22 Units by SubCUTAneous route nightly. Indications: type 2 diabetes mellitus    metFORMIN ER (GLUCOPHAGE XR) 500 mg tablet Take 2 Tablets by mouth Before breakfast and dinner.              Diabetes self-management practices:   Eating pattern-has managed her diet throughout pregnancy/ lost 45lbs   [x] Eating a carbohydrate-controlled meal plan    Physical activity pattern-not discussed    Monitoring pattern-monitoring daily throughout pregnancy   [x] Testing BGs sufficiently to inform self-management adjustments    Taking medications pattern  [x] Consistent administration  [x] Affordable  Reducing risks  [] Influenza:   Immunization History   Administered Date(s) Administered    Influenza, FLUARIX, FLULAVAL, FLUZONE (age 10 mo+) AND AFLURIA, (age 1 y+), PF, 0.5mL 2019 [] Pneumonia:   Immunization History   Administered Date(s) Administered    Pneumococcal Polysaccharide (PPSV-23) 2019     [] Hepatitis: There is no immunization history for the selected administration types on file for this patient. Social determinants of health impacting diabetes self-management practices   Struggling with anxiety and/or depression and Concerned that you need to know more about how to stay healthy with diabetes  Overall evaluation:    [x] Current A1C not accurate in pregnancy,     Subjective   I have lost 45lbs during pregnancy with my diet .      Objective   Physical exam  General Obese AA  female in no acute distress. Conversant and cooperative  Neuro  Alert, oriented   Vital Signs Visit Vitals  /77 (BP 1 Location: Right upper arm, BP Patient Position: At rest)   Pulse 99   Temp 97.4 °F (36.3 °C)   Resp 16   Ht 5' 5\" (1.651 m)   Wt 124.3 kg (274 lb)   SpO2 98%   Breastfeeding No   BMI 45.60 kg/m²     Skin  Warm and dry. Heart   Regular rate and rhythm.  No murmurs, rubs or gallops  Lungs  Clear to auscultation without rales or rhonchi  Extremities No foot wounds        Laboratory  Recent Labs     23  0008 23  0834   GLU  --  84   AGAP  --  8   WBC 11.3* 9.7   CREA  --  0.56   AST  --  20   ALT  --  22       Factors impacting BG management  Factor Dose Comments   Nutrition:  Standard meals/lactating mother     45/60/60 grams/meal      Pain Post op prn    Other:   Kidney function  Liver function     eGFR >60  WNL      Blood glucose pattern    Significant diabetes-related events over the past 24-72 hours  T2D in pregnancy  S/p    Fasting /    Assessment and Nursing Intervention   Nursing Diagnosis Risk for unstable blood glucose pattern   Nursing Intervention Domain 0388 Decision-making Support   Nursing Interventions Examined current inpatient diabetes/blood glucose control   Explored factors facilitating and impeding inpatient management  Explored corrective strategies with patient and responsible inpatient provider   Informed patient of rational for insulin strategy while hospitalized     Nursing Diagnosis 73285 Ineffective Health Management   Nursing Intervention Domain 5250 Decision-making Support   Nursing Interventions Identified diabetes self-management practices impeding diabetes control  Discussed diabetes survival skills related to  Healthy Plate eating plan; given handouts  Role of physical activity in improving insulin sensitivity and action  Procedure for blood glucose monitoring & options for low-cost products  Medications plan at discharge     Billing Code(s)   77559    Before making these care recommendations, I personally reviewed the hospitalization record, including notes, laboratory & diagnostic data and current medications, and examined the patient at the bedside (circumstances permitting) before determining care. More than fifty (50) percent of the time was spent in patient counseling and/or care coordination.   Total minutes: [address] [facility] WILLIE Tsai  Diabetes Clinical Nurse Specialist  [facility]  Access via [address] [place] [address]

## 2023-03-01 NOTE — ROUTINE PROCESS
Bedside and Verbal shift change report given to MARCO Islas RN (oncoming nurse) by Erendira Grossman. Adam Little (offgoing nurse). Report included the following information SBAR. 2015- Patient back on MIU.

## 2023-03-01 NOTE — ROUTINE PROCESS
Bedside shift change report given to DEISY Thompson RN (oncoming nurse) by Tomas Mc. Terrie Spears RN (offgoing nurse). Report included the following information SBAR.      9681- Patient forgot to call out for gluc check before eating dinner.

## 2023-03-01 NOTE — PROGRESS NOTES
Post-Operative  Day 1    Haimlton Vogt     Assessment: Post-Op day 1 s/p PLTCS for severe fetal hydrocephalus, stable    Plan:     - Routine post-operative care. - XX w/ severe hydrocephalus in NICU, neurosurgery involved  - DMII on metformin and SSI; Diabetic team following appreciate their assistance  - Anxiety/depression- on cymbalta  - Postop hemoglobin stable. Plan to start Fe at discharge if pt anemic. Hgb 11.1-->9. 9  - Ambulate today. - Need BREE dressing removed in x 1 week      Information for the patient's :  Gigi Moore [[id number]]   , Low Transverse   Patient doing well without significant complaint. Tolerating diet. Burton out. Ambulating. Hasnt passed flatus    Vitals:  Visit Vitals  /88 (BP 1 Location: Right arm, BP Patient Position: At rest)   Pulse 89   Temp 98 °F (36.7 °C)   Resp 16   Ht 5' 5\" (1.651 m)   Wt 124.3 kg (274 lb)   LMP 2022   SpO2 98%   Breastfeeding No   BMI 45.60 kg/m²     Temp (24hrs), Av.2 °F (36.8 °C), Min:97.4 °F (36.3 °C), Max:99.1 °F (37.3 °C)      Last 24hr Input/Output:    Intake/Output Summary (Last 24 hours) at 3/1/2023 1222  Last data filed at 2023 2345  Gross per 24 hour   Intake --   Output 1335 ml   Net -1335 ml          Exam:     Patient without distress. Fundus firm, nontender   Incision bandaged, clean, dry, intact. Perineum with normal lochia noted per nursing assessment. Lower extremities are negative for pathological edema.     Labs:   Lab Results   Component Value Date/Time    WBC 11.3 (H) 2023 12:08 AM    WBC 9.7 2023 08:34 AM    WBC 10.1 2023 05:18 PM    WBC 12.6 (H) 2023 04:32 AM    WBC 10.6 2022 10:24 AM    WBC 11.5 (H) 10/25/2022 01:08 PM    WBC 12.4 (H) 10/17/2022 04:16 PM    WBC 12.5 (H) 2022 01:40 PM    WBC 11.8 (H) 2021 10:33 AM    WBC 11.9 (H) 2021 01:13 AM    WBC 9.6 2021 01:49 PM    WBC 13.5 (H) 12/28/2020 08:50 PM    WBC 16.7 (H) 12/12/2020 05:15 PM    WBC 10.7 09/11/2019 11:51 AM    WBC 12.4 (H) 11/12/2017 02:40 PM    WBC 13.1 (H) 01/16/2017 08:36 PM    WBC 13.5 (H) 10/30/2016 06:05 PM    HGB 9.9 (L) 03/01/2023 12:08 AM    HGB 11.1 (L) 02/28/2023 08:34 AM    HGB 10.7 (L) 02/06/2023 05:18 PM    HGB 11.4 (L) 01/25/2023 04:32 AM    HGB 10.9 (L) 12/12/2022 10:24 AM    HGB 11.1 (L) 10/25/2022 01:08 PM    HGB 11.5 10/17/2022 04:16 PM    HGB 11.4 (L) 02/22/2022 01:40 PM    HGB 12.0 08/19/2021 10:33 AM    HGB 10.9 (L) 07/26/2021 01:13 AM    HGB 11.7 07/22/2021 01:49 PM    HGB 11.3 (L) 12/28/2020 08:50 PM    HGB 11.5 12/12/2020 05:15 PM    HGB 12.0 09/11/2019 11:51 AM    HGB 10.7 (L) 11/12/2017 02:40 PM    HGB 10.6 (L) 01/16/2017 08:36 PM    HGB 10.7 (L) 10/30/2016 06:05 PM    HCT 29.4 (L) 03/01/2023 12:08 AM    HCT 33.0 (L) 02/28/2023 08:34 AM    HCT 31.3 (L) 02/06/2023 05:18 PM    HCT 35.1 01/25/2023 04:32 AM    HCT 33.3 (L) 12/12/2022 10:24 AM    HCT 32.9 (L) 10/25/2022 01:08 PM    HCT 33.3 (L) 10/17/2022 04:16 PM    HCT 37.4 02/22/2022 01:40 PM    HCT 38.7 08/19/2021 10:33 AM    HCT 34.2 (L) 07/26/2021 01:13 AM    HCT 35.9 07/22/2021 01:49 PM    HCT 34.7 (L) 12/28/2020 08:50 PM    HCT 35.5 12/12/2020 05:15 PM    HCT 36.2 09/11/2019 11:51 AM    HCT 32.2 (L) 11/12/2017 02:40 PM    HCT 34.3 (L) 01/16/2017 08:36 PM    HCT 34.1 (L) 10/30/2016 06:05 PM    PLATELET 373 88/91/3958 12:08 AM    PLATELET 542 51/61/4310 08:34 AM    PLATELET 920 09/80/2714 05:18 PM    PLATELET 404 36/30/3193 04:32 AM    PLATELET 643 58/94/7714 10:24 AM    PLATELET 354 16/28/0533 01:08 PM    PLATELET 959 67/36/9004 04:16 PM    PLATELET 861 (H) 20/71/7084 01:40 PM    PLATELET 005 (H) 33/83/0412 10:33 AM    PLATELET 086 76/39/6439 01:13 AM    PLATELET 123 40/71/0796 01:49 PM    PLATELET 601 (H) 78/62/2578 08:50 PM    PLATELET 032 02/38/5660 05:15 PM    PLATELET 885 11/17/4735 11:51 AM    PLATELET 783 78/05/8812 02:40 PM    PLATELET 536 (H) 01/16/2017 08:36 PM    PLATELET 750 (H) 35/77/3033 06:05 PM       Recent Results (from the past 24 hour(s))   GLUCOSE, POC    Collection Time: 02/28/23  6:16 PM   Result Value Ref Range    Glucose (POC) 84 65 - 117 mg/dL    Performed by Clyde Esqueda    CBC WITH AUTOMATED DIFF    Collection Time: 03/01/23 12:08 AM   Result Value Ref Range    WBC 11.3 (H) 3.6 - 11.0 K/uL    RBC 3.49 (L) 3.80 - 5.20 M/uL    HGB 9.9 (L) 11.5 - 16.0 g/dL    HCT 29.4 (L) 35.0 - 47.0 %    MCV 84.2 80.0 - 99.0 FL    MCH 28.4 26.0 - 34.0 PG    MCHC 33.7 30.0 - 36.5 g/dL    RDW 13.2 11.5 - 14.5 %    PLATELET 011 581 - 042 K/uL    MPV 10.6 8.9 - 12.9 FL    NRBC 0.0 0  WBC    ABSOLUTE NRBC 0.00 0.00 - 0.01 K/uL    NEUTROPHILS 61 32 - 75 %    LYMPHOCYTES 31 12 - 49 %    MONOCYTES 7 5 - 13 %    EOSINOPHILS 1 0 - 7 %    BASOPHILS 0 0 - 1 %    IMMATURE GRANULOCYTES 0 0.0 - 0.5 %    ABS. NEUTROPHILS 6.8 1.8 - 8.0 K/UL    ABS. LYMPHOCYTES 3.5 0.8 - 3.5 K/UL    ABS. MONOCYTES 0.8 0.0 - 1.0 K/UL    ABS. EOSINOPHILS 0.1 0.0 - 0.4 K/UL    ABS. BASOPHILS 0.0 0.0 - 0.1 K/UL    ABS. IMM.  GRANS. 0.0 0.00 - 0.04 K/UL    DF AUTOMATED     GLUCOSE, POC    Collection Time: 03/01/23  6:35 AM   Result Value Ref Range    Glucose (POC) 108 65 - 117 mg/dL    Performed by [id number] [place]

## 2023-03-02 LAB
GLUCOSE BLD STRIP.AUTO-MCNC: 101 MG/DL (ref 65–117)
GLUCOSE BLD STRIP.AUTO-MCNC: 76 MG/DL (ref 65–117)
GLUCOSE BLD STRIP.AUTO-MCNC: 82 MG/DL (ref 65–117)
GLUCOSE BLD STRIP.AUTO-MCNC: 88 MG/DL (ref 65–117)
SERVICE CMNT-IMP: NORMAL

## 2023-03-02 PROCEDURE — 74011250637 HC RX REV CODE- 250/637: Performed by: OBSTETRICS & GYNECOLOGY

## 2023-03-02 PROCEDURE — 74011250637 HC RX REV CODE- 250/637: Performed by: NURSE PRACTITIONER

## 2023-03-02 PROCEDURE — 65410000002 HC RM PRIVATE OB

## 2023-03-02 PROCEDURE — 82962 GLUCOSE BLOOD TEST: CPT

## 2023-03-02 RX ADMIN — OXYCODONE AND ACETAMINOPHEN 2 TABLET: 5; 325 TABLET ORAL at 19:54

## 2023-03-02 RX ADMIN — IBUPROFEN 800 MG: 400 TABLET ORAL at 22:01

## 2023-03-02 RX ADMIN — OXYCODONE AND ACETAMINOPHEN 2 TABLET: 5; 325 TABLET ORAL at 01:45

## 2023-03-02 RX ADMIN — DOCUSATE SODIUM 100 MG: 100 CAPSULE, LIQUID FILLED ORAL at 06:21

## 2023-03-02 RX ADMIN — SIMETHICONE 80 MG: 80 TABLET, CHEWABLE ORAL at 06:21

## 2023-03-02 RX ADMIN — IBUPROFEN 800 MG: 400 TABLET ORAL at 14:04

## 2023-03-02 RX ADMIN — METFORMIN HYDROCHLORIDE 1000 MG: 500 TABLET, EXTENDED RELEASE ORAL at 19:28

## 2023-03-02 RX ADMIN — IBUPROFEN 800 MG: 400 TABLET ORAL at 06:21

## 2023-03-02 RX ADMIN — DOCUSATE SODIUM 100 MG: 100 CAPSULE, LIQUID FILLED ORAL at 19:28

## 2023-03-02 RX ADMIN — OXYCODONE AND ACETAMINOPHEN 2 TABLET: 5; 325 TABLET ORAL at 11:03

## 2023-03-02 RX ADMIN — OXYCODONE AND ACETAMINOPHEN 2 TABLET: 5; 325 TABLET ORAL at 16:12

## 2023-03-02 RX ADMIN — METFORMIN HYDROCHLORIDE 1000 MG: 500 TABLET, EXTENDED RELEASE ORAL at 09:15

## 2023-03-02 RX ADMIN — DULOXETINE HYDROCHLORIDE 40 MG: 20 CAPSULE, DELAYED RELEASE ORAL at 09:15

## 2023-03-02 RX ADMIN — OXYCODONE AND ACETAMINOPHEN 2 TABLET: 5; 325 TABLET ORAL at 06:21

## 2023-03-02 RX ADMIN — SIMETHICONE 80 MG: 80 TABLET, CHEWABLE ORAL at 16:12

## 2023-03-02 RX ADMIN — SIMETHICONE 80 MG: 80 TABLET, CHEWABLE ORAL at 22:01

## 2023-03-02 NOTE — ROUTINE PROCESS
2335: Bedside and Verbal shift change report given to [address] [place] [address] (oncoming nurse) by Compa Kevin (offgoing nurse). Report included the following information SBAR, Kardex, ED Summary, OR Summary, Procedure Summary, Intake/Output, MAR, and Recent Results. 0000: RN at bedside for shift handoff, pt found to be sleeping.  at bedside supportive and encouraging sleep, asked RN to come back at later time to allow the pt rest as discussed prior to pt falling asleep. RN instructed on calling out when pt is awake, in order to assess.  verbalized understanding and will call with any further questions.

## 2023-03-02 NOTE — PROGRESS NOTES
Post-Operative  Day 2    Hamilton Vogt     Assessment: Post-Op day 2, doing well    Plan:   - Routine post-operative care. - Plan for discharge [id number] [facility] [address] [facility] Discharge Date: Tomorrow. - XX w/ severe hydrocephalus in NICU, neurosurgery involved  - DMII on metformin and SSI; Diabetic team following appreciate their assistance  - Anxiety/depression- on cymbalta  - Postop hemoglobin stable. Plan to start Fe at discharge if pt anemic. Hgb 11.1-->9. 9  - Ambulate today. - Need BREE dressing removed in x 1 week    - issues w pain control overnight, improved today      Information for the patient's :  Emma Ramirez [[id number]]   , Low Transverse   Patient doing well without significant complaint. Tolerating regular diet. Ambulating. Voiding without difficulty. Vitals:  Visit Vitals  /80 (BP 1 Location: Left upper arm, BP Patient Position: At rest;Sitting)   Pulse 79   Temp 98.5 °F (36.9 °C)   Resp 16   Ht 5' 5\" (1.651 m)   Wt 124.3 kg (274 lb)   LMP 2022   SpO2 99%   Breastfeeding Unknown   BMI 45.60 kg/m²     Temp (24hrs), Av.1 °F (36.7 °C), Min:97.9 °F (36.6 °C), Max:98.5 °F (36.9 °C)        Exam:       Patient without distress. Fundus firm, nontender . Incision bandaged. Clean, dry, intact. Perineum with normal lochia noted per nursing assessment. Lower extremities are negative for pathological edema.     Labs:   Lab Results   Component Value Date/Time    WBC 11.3 (H) 2023 12:08 AM    WBC 9.7 2023 08:34 AM    WBC 10.1 2023 05:18 PM    WBC 12.6 (H) 2023 04:32 AM    WBC 10.6 2022 10:24 AM    WBC 11.5 (H) 10/25/2022 01:08 PM    WBC 12.4 (H) 10/17/2022 04:16 PM    WBC 12.5 (H) 2022 01:40 PM    WBC 11.8 (H) 2021 10:33 AM    WBC 11.9 (H) 2021 01:13 AM    WBC 9.6 2021 01:49 PM    WBC 13.5 (H) 2020 08:50 PM    WBC 16.7 (H) 2020 05:15 PM    WBC 10.7 2019 11:51 AM WBC 12.4 (H) 11/12/2017 02:40 PM    WBC 13.1 (H) 01/16/2017 08:36 PM    WBC 13.5 (H) 10/30/2016 06:05 PM    HGB 9.9 (L) 03/01/2023 12:08 AM    HGB 11.1 (L) 02/28/2023 08:34 AM    HGB 10.7 (L) 02/06/2023 05:18 PM    HGB 11.4 (L) 01/25/2023 04:32 AM    HGB 10.9 (L) 12/12/2022 10:24 AM    HGB 11.1 (L) 10/25/2022 01:08 PM    HGB 11.5 10/17/2022 04:16 PM    HGB 11.4 (L) 02/22/2022 01:40 PM    HGB 12.0 08/19/2021 10:33 AM    HGB 10.9 (L) 07/26/2021 01:13 AM    HGB 11.7 07/22/2021 01:49 PM    HGB 11.3 (L) 12/28/2020 08:50 PM    HGB 11.5 12/12/2020 05:15 PM    HGB 12.0 09/11/2019 11:51 AM    HGB 10.7 (L) 11/12/2017 02:40 PM    HGB 10.6 (L) 01/16/2017 08:36 PM    HGB 10.7 (L) 10/30/2016 06:05 PM    HCT 29.4 (L) 03/01/2023 12:08 AM    HCT 33.0 (L) 02/28/2023 08:34 AM    HCT 31.3 (L) 02/06/2023 05:18 PM    HCT 35.1 01/25/2023 04:32 AM    HCT 33.3 (L) 12/12/2022 10:24 AM    HCT 32.9 (L) 10/25/2022 01:08 PM    HCT 33.3 (L) 10/17/2022 04:16 PM    HCT 37.4 02/22/2022 01:40 PM    HCT 38.7 08/19/2021 10:33 AM    HCT 34.2 (L) 07/26/2021 01:13 AM    HCT 35.9 07/22/2021 01:49 PM    HCT 34.7 (L) 12/28/2020 08:50 PM    HCT 35.5 12/12/2020 05:15 PM    HCT 36.2 09/11/2019 11:51 AM    HCT 32.2 (L) 11/12/2017 02:40 PM    HCT 34.3 (L) 01/16/2017 08:36 PM    HCT 34.1 (L) 10/30/2016 06:05 PM    PLATELET 272 21/12/6287 12:08 AM    PLATELET 764 46/00/5148 08:34 AM    PLATELET 181 02/25/2869 05:18 PM    PLATELET 535 23/92/3563 04:32 AM    PLATELET 131 65/30/8973 10:24 AM    PLATELET 708 91/41/8116 01:08 PM    PLATELET 733 35/53/6092 04:16 PM    PLATELET 228 (H) 46/03/6306 01:40 PM    PLATELET 485 (H) 06/97/0886 10:33 AM    PLATELET 443 70/36/2132 01:13 AM    PLATELET 921 27/48/5056 01:49 PM    PLATELET 470 (H) 94/68/4996 08:50 PM    PLATELET 402 19/10/0100 05:15 PM    PLATELET 265 80/34/2305 11:51 AM    PLATELET 137 30/74/2975 02:40 PM    PLATELET 527 (H) 01/86/7964 08:36 PM    PLATELET 507 (H) 41/07/5738 06:05 PM       Recent Results (from the past 24 hour(s))   GLUCOSE, POC    Collection Time: 03/01/23  9:45 PM   Result Value Ref Range    Glucose (POC) 98 65 - 117 mg/dL    Performed by Jenae Washington

## 2023-03-02 NOTE — PROGRESS NOTES
Bedside shift change report given to Mayi Blair RN (oncoming nurse) by DEISY Evans RN (offgoing nurse). Report included the following information SBAR.     9:30 PM  Patient called out for 8/10 pain in abdomen and incision. Dressing intact, old drainage noted. Abdomen slightly distended, hypoactive bowel sounds, + flatus. 2 tabs percocet given, heating pad applied. Encouraged emptying bladder and ambulation. Fundus for @ U.     10:50 PM  Patient called out for 10/10 pain in abdomen and incision after ambulating to restroom. Called midwife, new orders received for simethicone and PO dilaudid once.

## 2023-03-02 NOTE — DIABETES MGMT
Diabetes Management Team to sign off at this point as patient's blood glucose remains stable. Please re-consult us if patient needs change. Thank you for including us in their care.       Signed By: Jeremy Romero, [id number] [facility]   Diabetes Health    March 2, 2023

## 2023-03-02 NOTE — ROUTINE PROCESS
Bedside shift change report given to DEISY Pacheco (oncoming nurse) by Everardo Gallegos RN (offgoing nurse). Report included the following information SBAR.

## 2023-03-03 LAB
GLUCOSE BLD STRIP.AUTO-MCNC: 74 MG/DL (ref 65–117)
GLUCOSE BLD STRIP.AUTO-MCNC: 74 MG/DL (ref 65–117)
GLUCOSE BLD STRIP.AUTO-MCNC: 94 MG/DL (ref 65–117)
SERVICE CMNT-IMP: NORMAL

## 2023-03-03 PROCEDURE — 74011250637 HC RX REV CODE- 250/637: Performed by: OBSTETRICS & GYNECOLOGY

## 2023-03-03 PROCEDURE — 82962 GLUCOSE BLOOD TEST: CPT

## 2023-03-03 PROCEDURE — 65410000002 HC RM PRIVATE OB

## 2023-03-03 PROCEDURE — 74011250637 HC RX REV CODE- 250/637: Performed by: NURSE PRACTITIONER

## 2023-03-03 RX ORDER — HYDROMORPHONE HYDROCHLORIDE 2 MG/1
2 TABLET ORAL
Status: DISCONTINUED | OUTPATIENT
Start: 2023-03-03 | End: 2023-03-04 | Stop reason: HOSPADM

## 2023-03-03 RX ORDER — IBUPROFEN 800 MG/1
800 TABLET ORAL EVERY 8 HOURS
Qty: 30 TABLET | Refills: 1 | Status: SHIPPED | OUTPATIENT
Start: 2023-03-03

## 2023-03-03 RX ORDER — HYDROMORPHONE HYDROCHLORIDE 2 MG/1
2 TABLET ORAL
Qty: 20 TABLET | Refills: 0 | Status: SHIPPED | OUTPATIENT
Start: 2023-03-03 | End: 2023-03-06

## 2023-03-03 RX ADMIN — SIMETHICONE 80 MG: 80 TABLET, CHEWABLE ORAL at 08:42

## 2023-03-03 RX ADMIN — IBUPROFEN 800 MG: 400 TABLET ORAL at 21:54

## 2023-03-03 RX ADMIN — IBUPROFEN 800 MG: 400 TABLET ORAL at 05:42

## 2023-03-03 RX ADMIN — IBUPROFEN 800 MG: 400 TABLET ORAL at 13:54

## 2023-03-03 RX ADMIN — METFORMIN HYDROCHLORIDE 1000 MG: 500 TABLET, EXTENDED RELEASE ORAL at 19:30

## 2023-03-03 RX ADMIN — METFORMIN HYDROCHLORIDE 1000 MG: 500 TABLET, EXTENDED RELEASE ORAL at 08:42

## 2023-03-03 RX ADMIN — HYDROMORPHONE HYDROCHLORIDE 2 MG: 2 TABLET ORAL at 16:39

## 2023-03-03 RX ADMIN — OXYCODONE AND ACETAMINOPHEN 2 TABLET: 5; 325 TABLET ORAL at 04:33

## 2023-03-03 RX ADMIN — DOCUSATE SODIUM 100 MG: 100 CAPSULE, LIQUID FILLED ORAL at 08:42

## 2023-03-03 RX ADMIN — HYDROMORPHONE HYDROCHLORIDE 2 MG: 2 TABLET ORAL at 23:30

## 2023-03-03 RX ADMIN — OXYCODONE AND ACETAMINOPHEN 2 TABLET: 5; 325 TABLET ORAL at 00:35

## 2023-03-03 RX ADMIN — SIMETHICONE 80 MG: 80 TABLET, CHEWABLE ORAL at 21:20

## 2023-03-03 RX ADMIN — SIMETHICONE 80 MG: 80 TABLET, CHEWABLE ORAL at 14:48

## 2023-03-03 RX ADMIN — HYDROMORPHONE HYDROCHLORIDE 2 MG: 2 TABLET ORAL at 12:30

## 2023-03-03 RX ADMIN — OXYCODONE AND ACETAMINOPHEN 2 TABLET: 5; 325 TABLET ORAL at 08:42

## 2023-03-03 RX ADMIN — DOCUSATE SODIUM 100 MG: 100 CAPSULE, LIQUID FILLED ORAL at 19:30

## 2023-03-03 RX ADMIN — DULOXETINE HYDROCHLORIDE 40 MG: 20 CAPSULE, DELAYED RELEASE ORAL at 08:41

## 2023-03-03 NOTE — PROGRESS NOTES
Post-Operative  Day 3    Hamilton Vogt       Assessment: Post-Op day 3, doing well  - XX w/ severe hydrocephalus in NICU, neurosurgery involved  - DMII on metformin and SSI; Diabetic team following appreciate their assistance  - Anxiety/depression- on cymbalta  - Postop hemoglobin stable. Plan to start Fe at discharge if pt anemic. Hgb 11.1-->9.9  - Need BREE dressing removed in x 1 week  - Pain has still been difficult to control, will switch from percocet to dilaudid today    Plan:   - Discharge home today if pain better controlled. - Follow up in office in 1 week(s) with [facility].  - Pain medication prescription(s) sent. - Questions answered. Information for the patient's :  Ria Mai [[id number]]   , Low Transverse  Patient doing well without significant complaint. Tolerating regular diet. Ambulating. Voiding without difficulty. Vitals:  Visit Vitals  /84 (BP 1 Location: Right arm, BP Patient Position: At rest)   Pulse 87   Temp 97.9 °F (36.6 °C)   Resp 16   Ht 5' 5\" (1.651 m)   Wt 124.3 kg (274 lb)   LMP 2022   SpO2 97%   Breastfeeding Unknown   BMI 45.60 kg/m²     Temp (24hrs), Av.2 °F (36.8 °C), Min:97.8 °F (36.6 °C), Max:98.8 °F (37.1 °C)        Exam:       Patient without distress. Fundus firm, nontender per nursing fundal checks. Incision bandaged. Clean, dry, intact. Perineum with normal lochia noted per nursing assessment. Lower extremities are negative for pathological edema.     Labs:   Lab Results   Component Value Date/Time    WBC 11.3 (H) 2023 12:08 AM    WBC 9.7 2023 08:34 AM    WBC 10.1 2023 05:18 PM    WBC 12.6 (H) 2023 04:32 AM    WBC 10.6 2022 10:24 AM    WBC 11.5 (H) 10/25/2022 01:08 PM    WBC 12.4 (H) 10/17/2022 04:16 PM    WBC 12.5 (H) 2022 01:40 PM    WBC 11.8 (H) 2021 10:33 AM    WBC 11.9 (H) 2021 01:13 AM    WBC 9.6 07/22/2021 01:49 PM    WBC 13.5 (H) 12/28/2020 08:50 PM    WBC 16.7 (H) 12/12/2020 05:15 PM    WBC 10.7 09/11/2019 11:51 AM    WBC 12.4 (H) 11/12/2017 02:40 PM    WBC 13.1 (H) 01/16/2017 08:36 PM    WBC 13.5 (H) 10/30/2016 06:05 PM    HGB 9.9 (L) 03/01/2023 12:08 AM    HGB 11.1 (L) 02/28/2023 08:34 AM    HGB 10.7 (L) 02/06/2023 05:18 PM    HGB 11.4 (L) 01/25/2023 04:32 AM    HGB 10.9 (L) 12/12/2022 10:24 AM    HGB 11.1 (L) 10/25/2022 01:08 PM    HGB 11.5 10/17/2022 04:16 PM    HGB 11.4 (L) 02/22/2022 01:40 PM    HGB 12.0 08/19/2021 10:33 AM    HGB 10.9 (L) 07/26/2021 01:13 AM    HGB 11.7 07/22/2021 01:49 PM    HGB 11.3 (L) 12/28/2020 08:50 PM    HGB 11.5 12/12/2020 05:15 PM    HGB 12.0 09/11/2019 11:51 AM    HGB 10.7 (L) 11/12/2017 02:40 PM    HGB 10.6 (L) 01/16/2017 08:36 PM    HGB 10.7 (L) 10/30/2016 06:05 PM    HCT 29.4 (L) 03/01/2023 12:08 AM    HCT 33.0 (L) 02/28/2023 08:34 AM    HCT 31.3 (L) 02/06/2023 05:18 PM    HCT 35.1 01/25/2023 04:32 AM    HCT 33.3 (L) 12/12/2022 10:24 AM    HCT 32.9 (L) 10/25/2022 01:08 PM    HCT 33.3 (L) 10/17/2022 04:16 PM    HCT 37.4 02/22/2022 01:40 PM    HCT 38.7 08/19/2021 10:33 AM    HCT 34.2 (L) 07/26/2021 01:13 AM    HCT 35.9 07/22/2021 01:49 PM    HCT 34.7 (L) 12/28/2020 08:50 PM    HCT 35.5 12/12/2020 05:15 PM    HCT 36.2 09/11/2019 11:51 AM    HCT 32.2 (L) 11/12/2017 02:40 PM    HCT 34.3 (L) 01/16/2017 08:36 PM    HCT 34.1 (L) 10/30/2016 06:05 PM    PLATELET 977 63/24/7536 12:08 AM    PLATELET 244 43/38/2872 08:34 AM    PLATELET 535 02/73/2201 05:18 PM    PLATELET 007 08/96/1647 04:32 AM    PLATELET 743 64/09/2055 10:24 AM    PLATELET 218 49/08/9336 01:08 PM    PLATELET 203 42/96/2769 04:16 PM    PLATELET 199 (H) 97/23/0964 01:40 PM    PLATELET 599 (H) 35/19/3363 10:33 AM    PLATELET 713 03/50/2863 01:13 AM    PLATELET 935 46/47/8345 01:49 PM    PLATELET 861 (H) 39/73/3366 08:50 PM    PLATELET 309 63/05/0263 05:15 PM    PLATELET 615 68/33/8825 11:51 AM PLATELET 233 61/87/8482 02:40 PM    PLATELET 018 (H) 17/31/7923 08:36 PM    PLATELET 263 (H) 91/88/7118 06:05 PM       Recent Results (from the past 24 hour(s))   GLUCOSE, POC    Collection Time: 03/02/23  7:25 PM   Result Value Ref Range    Glucose (POC) 88 65 - 117 mg/dL    Performed by Tomas Calderon, POC    Collection Time: 03/02/23 10:07 PM   Result Value Ref Range    Glucose (POC) 101 65 - 117 mg/dL    Performed by [address] [place] [address], POC    Collection Time: 03/03/23  5:46 AM   Result Value Ref Range    Glucose (POC) 74 65 - 117 mg/dL    Performed by [address] [place] [address], POC    Collection Time: 03/03/23 12:35 PM   Result Value Ref Range    Glucose (POC) 94 65 - 117 mg/dL    Performed by Elio Dahl

## 2023-03-03 NOTE — ROUTINE PROCESS
Sbar report from DANNIE Saeed Pt off of unit to NICU  states has already eaten meal that family member brought in and forgot about blood sugar Pt request to draw it later since had already eaten

## 2023-03-03 NOTE — PROGRESS NOTES
Pt still states pain is difficulty despite switch to dilaudid this morning. Per nursing staff she is sleeping comfortably prior to pain medication administration. Will keep another day to ensure adequate pain control but on current regimen.

## 2023-03-03 NOTE — DISCHARGE SUMMARY
Obstetrical Discharge Summary     Name: Alison Blandon MRN: [id number]  SSN: [id number]    YOB: 1990  Age: 34 y.o. Sex: female      Admit Date: 2023    Discharge Date: 3/4/2023    Admitting Physician: Brigido Shipley MD     Attending Physician:  Surinder Sweeney MD     Admission Diagnoses: Fetal hydrocephalus affecting management of mother in clark pregnancy [O35.06X0]  Diabetes mellitus complicating pregnancy [D01.479]  Obesity affecting pregnancy [O99.210]    Discharge Diagnoses:   Information for the patient's :  Yordan Stager [[id number]]   Delivery of a   female infant via , Low Transverse on 2023 at 10:48 AM  by Brigido Shipley. Apgars were 7  and 9 . Additional Diagnoses:   Hospital Problems  Date Reviewed: 2022            Codes Class Noted POA    Diabetes mellitus complicating pregnancy KPM-20-KL: O24.919  ICD-9-CM: 648.00, 250.00  2023 Unknown        Obesity affecting pregnancy ICD-10-CM: O99.210  ICD-9-CM: 649.10  2023 Unknown        Fetal hydrocephalus affecting management of mother in clark pregnancy ICD-10-CM: O35.06X0  ICD-9-CM: 655.00  2023 Unknown          Lab Results   Component Value Date/Time    Rubella, External Immune 2022 12:00 AM    GrBStrep, External Negative 2023 12:00 AM       Hospital Course: Normal hospital course following the delivery. Patient Instructions:   Current Discharge Medication List        START taking these medications    Details   HYDROmorphone (DILAUDID) 2 mg tablet Take 1 Tablet by mouth every four (4) hours as needed for Pain for up to 3 days. Max Daily Amount: 12 mg.  Qty: 20 Tablet, Refills: 0    Associated Diagnoses: Post-op pain      ibuprofen (MOTRIN) 800 mg tablet Take 1 Tablet by mouth every eight (8) hours. Qty: 30 Tablet, Refills: 1           CONTINUE these medications which have NOT CHANGED    Details   DULoxetine (CYMBALTA) 20 mg capsule Take 20 mg by mouth daily. Indications: anxiousness associated with depression, major depressive disorder      insulin regular (NOVOLIN R, HUMULIN R) 100 unit/mL injection 22 Units by SubCUTAneous route nightly. Indications: type 2 diabetes mellitus      lancets (Accu-Chek Fastclix Lancet Drum) misc Check blood sugar 1-3 times daily or as directed by JESUS Price 11.9  Qty: 100 Each, Refills: 11    Associated Diagnoses: Type 2 diabetes mellitus with hyperglycemia, without long-term current use of insulin ([address] Diabetes mellitus affecting pregnancy in first trimester      prenatal vit/iron fum/folic ac (PRENATAL 1+1 PO) Prenatal      blood-glucose sensor (DEXCOM G6 SENSOR) by Does Not Apply route. blood-glucose transmitter (DEXCOM G6 TRANSMITTER) by Does Not Apply route. metFORMIN ER (GLUCOPHAGE XR) 500 mg tablet Take 2 Tablets by mouth Before breakfast and dinner. Qty: 360 Tablet, Refills: 3      Blood Glucose Control High&Low (Accu-Chek Guide L1-L2 Ctrl Sol) soln Check blood sugar 1-3 times daily or as directed by JESUS Price 11.Sam  Qty: 1 Each, Refills: 2    Associated Diagnoses: Type 2 diabetes mellitus with hyperglycemia, without long-term current use of insulin ([address] Diabetes mellitus affecting pregnancy in first trimester      Blood-Glucose Meter (Accu-Chek Guide Glucose Meter) misc Check blood sugar 1-3 times daily or as directed by JESUS Price 11.Sam  Qty: 1 Each, Refills: 0    Associated Diagnoses: Type 2 diabetes mellitus with hyperglycemia, without long-term current use of insulin ([address] Diabetes mellitus affecting pregnancy in first trimester      glucose blood VI test strips (Accu-Chek Guide test strips) strip Check blood sugar 1-3 times daily or as directed by JESUS Price 11.9  Qty: 100 Strip, Refills: 11    Associated Diagnoses: Type 2 diabetes mellitus with hyperglycemia, without long-term current use of insulin ([address]  Diabetes mellitus affecting pregnancy in first trimester           STOP taking these medications       zolpidem (Ambien) 5 mg tablet Comments:   Reason for Stopping:         aspirin delayed-release 81 mg tablet Comments:   Reason for Stopping:               Disposition at Discharge: Home or self care    Condition at Discharge: Stable    Reference my discharge instructions. Follow-up Appointments   Procedures    FOLLOW UP VISIT Appointment in: Two Weeks At [facility]     At [facility]     Standing Status:   Standing     Number of Occurrences:   1     Order Specific Question:   Appointment in     Answer:    Two Weeks        Signed By:  Darren Osborn MD     March 3, 2023

## 2023-03-03 NOTE — ROUTINE PROCESS
Bedside shift change report given to P. Kathrene Alpers (oncoming nurse) by DEISY Thompson (offgoing nurse). Report included the following information SBAR.

## 2023-03-03 NOTE — LACTATION NOTE
Lactation follow up- Mom for d/c today. [facility] and visiting baby in NICU. Milk is in and she is pumping 2.5-3 oz a session now. She has her own pump but will also be using the hospital pump while here. We discussed engorgement mgmt. Breasts may become engorged when milk \"comes in\". How milk is made / normal phases of milk production, supply and demand discussed. Taught care of engorged breasts - frequent breastfeeding encouraged. Mom should put the baby to the breast and allow him to completely finish one breast before offering the second breast. She may pump a couple minutes after nursing for comfort. She can apply ice to the breasts for 10-15 minutes after nursing as needed. All questions answered.

## 2023-03-04 VITALS
RESPIRATION RATE: 16 BRPM | HEIGHT: 65 IN | OXYGEN SATURATION: 98 % | SYSTOLIC BLOOD PRESSURE: 133 MMHG | WEIGHT: 274 LBS | DIASTOLIC BLOOD PRESSURE: 85 MMHG | BODY MASS INDEX: 45.65 KG/M2 | TEMPERATURE: 98 F | HEART RATE: 89 BPM

## 2023-03-04 LAB
GLUCOSE BLD STRIP.AUTO-MCNC: 81 MG/DL (ref 65–117)
SERVICE CMNT-IMP: NORMAL

## 2023-03-04 PROCEDURE — 82962 GLUCOSE BLOOD TEST: CPT

## 2023-03-04 PROCEDURE — 74011250637 HC RX REV CODE- 250/637: Performed by: OBSTETRICS & GYNECOLOGY

## 2023-03-04 PROCEDURE — 74011250637 HC RX REV CODE- 250/637: Performed by: NURSE PRACTITIONER

## 2023-03-04 RX ADMIN — DOCUSATE SODIUM 100 MG: 100 CAPSULE, LIQUID FILLED ORAL at 07:06

## 2023-03-04 RX ADMIN — IBUPROFEN 800 MG: 400 TABLET ORAL at 05:24

## 2023-03-04 RX ADMIN — HYDROMORPHONE HYDROCHLORIDE 2 MG: 2 TABLET ORAL at 07:06

## 2023-03-04 RX ADMIN — IBUPROFEN 800 MG: 400 TABLET ORAL at 14:58

## 2023-03-04 RX ADMIN — HYDROMORPHONE HYDROCHLORIDE 2 MG: 2 TABLET ORAL at 11:37

## 2023-03-04 RX ADMIN — SIMETHICONE 80 MG: 80 TABLET, CHEWABLE ORAL at 03:34

## 2023-03-04 RX ADMIN — METFORMIN HYDROCHLORIDE 1000 MG: 500 TABLET, EXTENDED RELEASE ORAL at 10:02

## 2023-03-04 RX ADMIN — HYDROMORPHONE HYDROCHLORIDE 2 MG: 2 TABLET ORAL at 03:34

## 2023-03-04 RX ADMIN — DULOXETINE HYDROCHLORIDE 40 MG: 20 CAPSULE, DELAYED RELEASE ORAL at 10:02

## 2023-03-04 RX ADMIN — SIMETHICONE 80 MG: 80 TABLET, CHEWABLE ORAL at 11:37

## 2023-03-04 NOTE — ROUTINE PROCESS
0750- Bedside shift change report given to MARCO Guthrie RN (oncoming nurse) by LEANDRO Coppola (offgoing nurse). Report included the following information SBAR and MAR.     1500- I have reviewed discharge instructions with the patient. The patient verbalized understanding.

## 2023-03-04 NOTE — PROGRESS NOTES
Post-Operative  Day 4    Hamilton Vogt       Assessment: Post-Op day 4, doing well  - XX w/ severe hydrocephalus in NICU, neurosurgery involved  - DMII on metformin - well controlled  - Anxiety/depression- on cymbalta  - Postop hemoglobin stable. Plan to start Fe at discharge Hgb 11.1-->9.9  - Need BREE dressing removed in x 1 week, scheduled  - Pain better controlled on oral dilaudid    Plan:   - Discharge home today. - Follow up in office in 1 week(s) with [facility].  - Pain medication prescription(s) sent. - Questions answered. Information for the patient's :  Ricky Stubbs [[id number]]   , Low Transverse  Patient doing well without significant complaint. Tolerating regular diet. Ambulating. Voiding without difficulty. Vitals:  Visit Vitals  /85 (BP 1 Location: Left arm, BP Patient Position: At rest)   Pulse 89   Temp 98 °F (36.7 °C)   Resp 16   Ht 5' 5\" (1.651 m)   Wt 124.3 kg (274 lb)   LMP 2022   SpO2 98%   Breastfeeding Unknown   BMI 45.60 kg/m²     Temp (24hrs), Av.2 °F (36.8 °C), Min:97.9 °F (36.6 °C), Max:98.5 °F (36.9 °C)        Exam:       Patient without distress. Fundus firm, nontender per nursing fundal checks. Incision bandaged. Clean, dry, intact. Perineum with normal lochia noted per nursing assessment. Lower extremities are negative for pathological edema.     Labs:   Lab Results   Component Value Date/Time    WBC 11.3 (H) 2023 12:08 AM    WBC 9.7 2023 08:34 AM    WBC 10.1 2023 05:18 PM    WBC 12.6 (H) 2023 04:32 AM    WBC 10.6 2022 10:24 AM    WBC 11.5 (H) 10/25/2022 01:08 PM    WBC 12.4 (H) 10/17/2022 04:16 PM    WBC 12.5 (H) 2022 01:40 PM    WBC 11.8 (H) 2021 10:33 AM    WBC 11.9 (H) 2021 01:13 AM    WBC 9.6 2021 01:49 PM    WBC 13.5 (H) 2020 08:50 PM    WBC 16.7 (H) 2020 05:15 PM    WBC 10.7 09/11/2019 11:51 AM    WBC 12.4 (H) 11/12/2017 02:40 PM    WBC 13.1 (H) 01/16/2017 08:36 PM    WBC 13.5 (H) 10/30/2016 06:05 PM    HGB 9.9 (L) 03/01/2023 12:08 AM    HGB 11.1 (L) 02/28/2023 08:34 AM    HGB 10.7 (L) 02/06/2023 05:18 PM    HGB 11.4 (L) 01/25/2023 04:32 AM    HGB 10.9 (L) 12/12/2022 10:24 AM    HGB 11.1 (L) 10/25/2022 01:08 PM    HGB 11.5 10/17/2022 04:16 PM    HGB 11.4 (L) 02/22/2022 01:40 PM    HGB 12.0 08/19/2021 10:33 AM    HGB 10.9 (L) 07/26/2021 01:13 AM    HGB 11.7 07/22/2021 01:49 PM    HGB 11.3 (L) 12/28/2020 08:50 PM    HGB 11.5 12/12/2020 05:15 PM    HGB 12.0 09/11/2019 11:51 AM    HGB 10.7 (L) 11/12/2017 02:40 PM    HGB 10.6 (L) 01/16/2017 08:36 PM    HGB 10.7 (L) 10/30/2016 06:05 PM    HCT 29.4 (L) 03/01/2023 12:08 AM    HCT 33.0 (L) 02/28/2023 08:34 AM    HCT 31.3 (L) 02/06/2023 05:18 PM    HCT 35.1 01/25/2023 04:32 AM    HCT 33.3 (L) 12/12/2022 10:24 AM    HCT 32.9 (L) 10/25/2022 01:08 PM    HCT 33.3 (L) 10/17/2022 04:16 PM    HCT 37.4 02/22/2022 01:40 PM    HCT 38.7 08/19/2021 10:33 AM    HCT 34.2 (L) 07/26/2021 01:13 AM    HCT 35.9 07/22/2021 01:49 PM    HCT 34.7 (L) 12/28/2020 08:50 PM    HCT 35.5 12/12/2020 05:15 PM    HCT 36.2 09/11/2019 11:51 AM    HCT 32.2 (L) 11/12/2017 02:40 PM    HCT 34.3 (L) 01/16/2017 08:36 PM    HCT 34.1 (L) 10/30/2016 06:05 PM    PLATELET 271 41/81/3121 12:08 AM    PLATELET 222 06/60/7667 08:34 AM    PLATELET 791 54/30/0163 05:18 PM    PLATELET 915 07/29/1400 04:32 AM    PLATELET 093 20/16/2597 10:24 AM    PLATELET 761 85/42/7396 01:08 PM    PLATELET 896 13/19/7343 04:16 PM    PLATELET 757 (H) 77/92/1142 01:40 PM    PLATELET 641 (H) 86/03/9478 10:33 AM    PLATELET 103 17/02/6983 01:13 AM    PLATELET 650 95/83/2242 01:49 PM    PLATELET 448 (H) 54/64/1779 08:50 PM    PLATELET 513 68/18/9271 05:15 PM    PLATELET 476 65/79/8509 11:51 AM    PLATELET 145 54/47/9260 02:40 PM    PLATELET 074 (H) 99/00/1302 08:36 PM    PLATELET 926 (H) 95/07/3062 06:05 PM Recent Results (from the past 24 hour(s))   GLUCOSE, POC    Collection Time: 03/03/23 12:35 PM   Result Value Ref Range    Glucose (POC) 94 65 - 117 mg/dL    Performed by Edwin Wheeler, POC    Collection Time: 03/03/23  9:56 PM   Result Value Ref Range    Glucose (POC) 74 65 - 117 mg/dL    Performed by Pavithra Henao, POC    Collection Time: 03/04/23  7:05 AM   Result Value Ref Range    Glucose (POC) 81 65 - 117 mg/dL    Performed by Chaparrita Jung

## 2023-03-04 NOTE — PROGRESS NOTES
Bedside and Verbal shift change report given to LEANDRO Larson RN (oncoming nurse) by Ata Sheffield RN (offgoing nurse). Report included the following information SBAR, Kardex, Intake/Output, MAR, and Recent Results.

## 2023-03-04 NOTE — ROUTINE PROCESS
Bedside and Verbal shift change report given to Conner Armstrong RN (oncoming nurse) by Misa Lamar RN (offgoing nurse). Report included the following information SBAR.

## 2023-05-22 RX ORDER — IBUPROFEN 800 MG/1
800 TABLET ORAL EVERY 8 HOURS
COMMUNITY
Start: 2023-03-03

## 2023-05-22 RX ORDER — DULOXETIN HYDROCHLORIDE 20 MG/1
20 CAPSULE, DELAYED RELEASE ORAL DAILY
COMMUNITY

## 2023-05-22 RX ORDER — METFORMIN HYDROCHLORIDE 500 MG/1
1000 TABLET, EXTENDED RELEASE ORAL
COMMUNITY
Start: 2022-08-11

## 2023-09-06 LAB — PAP SMEAR, EXTERNAL: NORMAL

## 2023-09-12 ENCOUNTER — TELEPHONE (OUTPATIENT)
Facility: CLINIC | Age: 33
End: 2023-09-12

## 2023-09-12 NOTE — TELEPHONE ENCOUNTER
----- Message from Kushal sent at 9/11/2023 12:13 PM EDT -----  Subject: Appointment Request    Reason for Call: Established Patient Appointment needed: Routine Physical   Exam    QUESTIONS    Reason for appointment request? No appointments available during search     Additional Information for Provider? patient needs to be seen in person.     as well, prefer to have same day appointment  ---------------------------------------------------------------------------  --------------  Arias Pink  [phone]; OK to leave message on voicemail,OK to respond with electronic   message via [facility] portal (only for patients who have registered [facility]   account)  ---------------------------------------------------------------------------  --------------  SCRIPT ANSWERS

## 2024-01-18 LAB
C. TRACHOMATIS, EXTERNAL RESULT: NEGATIVE
HEP B, EXTERNAL RESULT: NEGATIVE
HEPATITIS C ANTIBODY, EXTERNAL RESULT: NON REACTIVE
HIV, EXTERNAL RESULT: NON REACTIVE
N. GONORRHOEAE, EXTERNAL RESULT: NEGATIVE
RUBELLA TITER, EXTERNAL RESULT: NORMAL
T. PALLIDUM (SYPHILIS) ANTIBODY, EXTERNAL RESULT: NON REACTIVE

## 2024-02-08 LAB — HBA1C MFR BLD HPLC: 6 %

## 2024-02-20 NOTE — PROGRESS NOTES
Chief Complaint   Patient presents with    Follow-up     1. Have you been to the ER, urgent care clinic since your last visit?  Hospitalized since your last visit?No    2. Have you seen or consulted any other health care providers outside of the [facility] since your last visit?  Include any pap smears or colon screening. No

## 2024-02-21 ENCOUNTER — TELEMEDICINE (OUTPATIENT)
Facility: CLINIC | Age: 34
End: 2024-02-21
Payer: COMMERCIAL

## 2024-02-21 DIAGNOSIS — Z3A.13 13 WEEKS GESTATION OF PREGNANCY: ICD-10-CM

## 2024-02-21 DIAGNOSIS — E66.01 OBESITY, MORBID (HCC): ICD-10-CM

## 2024-02-21 DIAGNOSIS — E11.69 TYPE 2 DIABETES MELLITUS WITH OTHER SPECIFIED COMPLICATION, WITH LONG-TERM CURRENT USE OF INSULIN (HCC): Primary | ICD-10-CM

## 2024-02-21 DIAGNOSIS — Z79.4 TYPE 2 DIABETES MELLITUS WITH OTHER SPECIFIED COMPLICATION, WITH LONG-TERM CURRENT USE OF INSULIN (HCC): Primary | ICD-10-CM

## 2024-02-21 DIAGNOSIS — F41.9 ANXIETY DISORDER, UNSPECIFIED TYPE: ICD-10-CM

## 2024-02-21 PROCEDURE — 99213 OFFICE O/P EST LOW 20 MIN: CPT | Performed by: FAMILY MEDICINE

## 2024-02-21 NOTE — PROGRESS NOTES
Toya Godoy is a 33 y.o. female who was seen by synchronous (real-time) audio-video technology on 2/21/2024.      Consent: Toya Godoy, who was seen by synchronous (real-time) audio-video technology, and/or her healthcare decision maker, is aware that this patient-initiated, Telehealth encounter on 2/21/2024 is a billable service, with coverage as determined by her insurance carrier. She is aware that she may receive a bill and has provided verbal consent to proceed: Yes.    Assessment & Plan:   1. Type 2 diabetes mellitus with other specified complication, with long-term current use of insulin (HCC)  Management in pregnancy per Dr Buckner, report of last A1c as 6, now back on insulin, has dexcom, doing well in this pregnancy    2. Anxiety disorder, unspecified type  Cymbalta is per her obgyn right now, Dr Hobbs  Is recently restarted, doing well, tolerating    3. Obesity, morbid (HCC)  Healthy habits encouraged     4. 13 weeks gestation of pregnancy  Mgmt per Dr Hobbs           Subjective:   Toya Godoy is a 33 y.o. female who was seen for Follow-up      Recently found out she is pregnant again  She is almost 13 weeks now, again  Following with endocrinology again  She says her A1c was 6 recently, she is being diligent regarding this    OBGYN is Maria Guadalupe Hobbs     Taking insulin again now  Started back last week  Was on just metformin prior to that  She says she is seeing endo (DR BUCKNER)    Recently restarted cymbalta she tells me, she is on it per Dr Hobbs for mood   She has a lot of anxiety in this pregnancy, she sometimes is having trouble falling asleep  Her daughter is about to turn 1 and she says in the last year she has had 7 surgeries, and that has just been a lot of moving pieces    She is still working too, full time    Eye exam: hasn't had one yet, she knows that she need    Medications, allergies, PMH, PSH, SOCH, FAMH reviewed and updated per routine protocol, see chart for review and changes

## 2024-04-29 ENCOUNTER — HOSPITAL ENCOUNTER (EMERGENCY)
Facility: HOSPITAL | Age: 34
Discharge: HOME | End: 2024-04-30
Payer: COMMERCIAL

## 2024-04-29 ENCOUNTER — APPOINTMENT (OUTPATIENT)
Facility: HOSPITAL | Age: 34
End: 2024-04-29
Payer: COMMERCIAL

## 2024-04-29 VITALS
HEART RATE: 113 BPM | DIASTOLIC BLOOD PRESSURE: 65 MMHG | RESPIRATION RATE: 22 BRPM | TEMPERATURE: 99.9 F | SYSTOLIC BLOOD PRESSURE: 138 MMHG

## 2024-04-29 LAB
ABDOMINAL CIRCUMFERENCE: 16.78 CM
ABDOMINAL CIRCUMFERENCE: 16.78 CM
APPEARANCE UR: CLEAR
BACTERIA URNS QL MICRO: NEGATIVE /HPF
BASOPHILS # BLD: 0 K/UL (ref 0–0.1)
BASOPHILS NFR BLD: 0 % (ref 0–1)
BILIRUB UR QL: NEGATIVE
BIPARIETAL DIAMETER: 5.36 CM
BIPARIETAL DIAMETER: 5.36 CM
COLOR UR: NORMAL
DIFFERENTIAL METHOD BLD: ABNORMAL
EOSINOPHIL # BLD: 0.3 K/UL (ref 0–0.4)
EOSINOPHIL NFR BLD: 2 % (ref 0–7)
EPITH CASTS URNS QL MICRO: NORMAL /LPF
ERYTHROCYTE [DISTWIDTH] IN BLOOD BY AUTOMATED COUNT: 14.4 % (ref 11.5–14.5)
GLUCOSE UR STRIP.AUTO-MCNC: NEGATIVE MG/DL
HCT VFR BLD AUTO: 30.7 % (ref 35–47)
HEAD CIRCUMFERENCE: 19.69 CM
HEAD CIRCUMFERENCE: 19.69 CM
HGB BLD-MCNC: 10.7 G/DL (ref 11.5–16)
HGB UR QL STRIP: NEGATIVE
HYALINE CASTS URNS QL MICRO: NORMAL /LPF (ref 0–5)
IMM GRANULOCYTES # BLD AUTO: 0.1 K/UL (ref 0–0.04)
IMM GRANULOCYTES NFR BLD AUTO: 1 % (ref 0–0.5)
KETONES UR QL STRIP.AUTO: NEGATIVE MG/DL
LEUKOCYTE ESTERASE UR QL STRIP.AUTO: NEGATIVE
LYMPHOCYTES # BLD: 3.5 K/UL (ref 0.8–3.5)
LYMPHOCYTES NFR BLD: 29 % (ref 12–49)
MCH RBC QN AUTO: 28 PG (ref 26–34)
MCHC RBC AUTO-ENTMCNC: 34.9 G/DL (ref 30–36.5)
MCV RBC AUTO: 80.4 FL (ref 80–99)
MONOCYTES # BLD: 0.6 K/UL (ref 0–1)
MONOCYTES NFR BLD: 5 % (ref 5–13)
NEUTS SEG # BLD: 7.7 K/UL (ref 1.8–8)
NEUTS SEG NFR BLD: 63 % (ref 32–75)
NITRITE UR QL STRIP.AUTO: NEGATIVE
NRBC # BLD: 0 K/UL (ref 0–0.01)
NRBC BLD-RTO: 0 PER 100 WBC
PH UR STRIP: 7 (ref 5–8)
PLATELET # BLD AUTO: 394 K/UL (ref 150–400)
PMV BLD AUTO: 9.6 FL (ref 8.9–12.9)
PROT UR STRIP-MCNC: NEGATIVE MG/DL
RBC # BLD AUTO: 3.82 M/UL (ref 3.8–5.2)
RBC #/AREA URNS HPF: NORMAL /HPF (ref 0–5)
SP GR UR REFRACTOMETRY: <1.005 (ref 1–1.03)
URINE CULTURE IF INDICATED: NORMAL
UROBILINOGEN UR QL STRIP.AUTO: 0.2 EU/DL (ref 0.2–1)
WBC # BLD AUTO: 12.1 K/UL (ref 3.6–11)
WBC URNS QL MICRO: NORMAL /HPF (ref 0–4)

## 2024-04-29 PROCEDURE — 81001 URINALYSIS AUTO W/SCOPE: CPT

## 2024-04-29 PROCEDURE — 36415 COLL VENOUS BLD VENIPUNCTURE: CPT

## 2024-04-29 PROCEDURE — 85025 COMPLETE CBC W/AUTO DIFF WBC: CPT

## 2024-04-29 PROCEDURE — 6360000002 HC RX W HCPCS: Performed by: OBSTETRICS & GYNECOLOGY

## 2024-04-29 PROCEDURE — 76815 OB US LIMITED FETUS(S): CPT

## 2024-04-29 PROCEDURE — 4500000002 HC ER NO CHARGE

## 2024-04-29 PROCEDURE — 6370000000 HC RX 637 (ALT 250 FOR IP): Performed by: OBSTETRICS & GYNECOLOGY

## 2024-04-29 RX ORDER — SODIUM CHLORIDE, SODIUM LACTATE, POTASSIUM CHLORIDE, CALCIUM CHLORIDE 600; 310; 30; 20 MG/100ML; MG/100ML; MG/100ML; MG/100ML
INJECTION, SOLUTION INTRAVENOUS CONTINUOUS
Status: DISCONTINUED | OUTPATIENT
Start: 2024-04-29 | End: 2024-04-30 | Stop reason: HOSPADM

## 2024-04-29 RX ORDER — SODIUM CHLORIDE, SODIUM LACTATE, POTASSIUM CHLORIDE, AND CALCIUM CHLORIDE .6; .31; .03; .02 G/100ML; G/100ML; G/100ML; G/100ML
500 INJECTION, SOLUTION INTRAVENOUS ONCE
Status: DISCONTINUED | OUTPATIENT
Start: 2024-04-29 | End: 2024-04-30 | Stop reason: HOSPADM

## 2024-04-29 RX ORDER — ACETAMINOPHEN 500 MG
1000 TABLET ORAL EVERY 4 HOURS PRN
Status: DISCONTINUED | OUTPATIENT
Start: 2024-04-29 | End: 2024-04-30 | Stop reason: HOSPADM

## 2024-04-29 RX ORDER — HYDROMORPHONE HYDROCHLORIDE 1 MG/ML
1 INJECTION, SOLUTION INTRAMUSCULAR; INTRAVENOUS; SUBCUTANEOUS ONCE
Status: COMPLETED | OUTPATIENT
Start: 2024-04-29 | End: 2024-04-29

## 2024-04-29 RX ORDER — SODIUM CHLORIDE, SODIUM LACTATE, POTASSIUM CHLORIDE, AND CALCIUM CHLORIDE .6; .31; .03; .02 G/100ML; G/100ML; G/100ML; G/100ML
1000 INJECTION, SOLUTION INTRAVENOUS ONCE
Status: DISCONTINUED | OUTPATIENT
Start: 2024-04-29 | End: 2024-04-30 | Stop reason: HOSPADM

## 2024-04-29 RX ORDER — ONDANSETRON 2 MG/ML
4 INJECTION INTRAMUSCULAR; INTRAVENOUS EVERY 6 HOURS PRN
Status: DISCONTINUED | OUTPATIENT
Start: 2024-04-29 | End: 2024-04-30 | Stop reason: HOSPADM

## 2024-04-29 RX ORDER — ASPIRIN 81 MG/1
81 TABLET ORAL DAILY
COMMUNITY

## 2024-04-29 RX ADMIN — ACETAMINOPHEN 1000 MG: 500 TABLET ORAL at 21:10

## 2024-04-29 RX ADMIN — HYDROMORPHONE HYDROCHLORIDE 1 MG: 1 INJECTION, SOLUTION INTRAMUSCULAR; INTRAVENOUS; SUBCUTANEOUS at 23:10

## 2024-04-29 ASSESSMENT — ENCOUNTER SYMPTOMS
ABDOMINAL PAIN: 1
EYES NEGATIVE: 1
RESPIRATORY NEGATIVE: 1
ALLERGIC/IMMUNOLOGIC NEGATIVE: 1

## 2024-04-29 ASSESSMENT — PAIN DESCRIPTION - ORIENTATION
ORIENTATION: RIGHT;LOWER
ORIENTATION: RIGHT

## 2024-04-29 ASSESSMENT — PAIN DESCRIPTION - DESCRIPTORS
DESCRIPTORS: ACHING;STABBING;SHARP
DESCRIPTORS: SHARP;STABBING

## 2024-04-29 ASSESSMENT — PAIN SCALES - GENERAL
PAINLEVEL_OUTOF10: 9
PAINLEVEL_OUTOF10: 8

## 2024-04-29 ASSESSMENT — PAIN DESCRIPTION - LOCATION
LOCATION: ABDOMEN
LOCATION: ABDOMEN

## 2024-04-30 ENCOUNTER — HOSPITAL ENCOUNTER (EMERGENCY)
Facility: HOSPITAL | Age: 34
Discharge: HOME | End: 2024-05-03
Payer: COMMERCIAL

## 2024-04-30 PROCEDURE — 99283 EMERGENCY DEPT VISIT LOW MDM: CPT

## 2024-04-30 PROCEDURE — 74181 MRI ABDOMEN W/O CONTRAST: CPT

## 2024-04-30 NOTE — H&P
Determinants of Health     Financial Resource Strain: Not on file   Food Insecurity: Not on file   Transportation Needs: Not on file   Physical Activity: Not on file   Stress: Not on file   Social Connections: Not on file   Intimate Partner Violence: Not on file   Housing Stability: Not on file         ALLERGIES: Aspirin    Review of Systems   Constitutional: Negative.    HENT: Negative.     Eyes: Negative.    Respiratory: Negative.     Cardiovascular: Negative.    Gastrointestinal:  Positive for abdominal pain.   Endocrine: Negative.    Genitourinary: Negative.    Musculoskeletal: Negative.    Allergic/Immunologic: Negative.    Neurological: Negative.    Hematological: Negative.    Psychiatric/Behavioral: Negative.         There were no vitals filed for this visit.         Physical Exam  Vitals and nursing note reviewed. Exam conducted with a chaperone present.   Constitutional:       Appearance: She is obese.   HENT:      Head: Normocephalic and atraumatic.      Nose: Nose normal.      Mouth/Throat:      Pharynx: Oropharynx is clear.   Eyes:      Extraocular Movements: Extraocular movements intact.      Pupils: Pupils are equal, round, and reactive to light.   Cardiovascular:      Rate and Rhythm: Normal rate and regular rhythm.      Pulses: Normal pulses.      Heart sounds: Normal heart sounds.   Pulmonary:      Effort: Pulmonary effort is normal.      Breath sounds: Normal breath sounds.   Abdominal:      Comments: Gravid, relaxed, FHTs 150 bpm  Voluntary guarding in the Right lower quadrant, +tenderness to deep palpation     Genitourinary:     Comments: deferred  Musculoskeletal:         General: Normal range of motion.      Cervical back: Normal range of motion and neck supple.   Neurological:      General: No focal deficit present.      Mental Status: She is oriented to person, place, and time.   Psychiatric:         Mood and Affect: Mood normal.         Behavior: Behavior normal.          MDM  Number of

## 2024-04-30 NOTE — PROGRESS NOTES
2029 - patient arrived to main ED via squad c/o RLQ pain, severe stabbing pain with rebound tenderness, EMS requested evaluation by ED and per EMS \"they looked at her in the stretcher and said since she is pregnant she has to go to L&D\" despite EMS concern for non OB related issue, patient then brought to [facility]   2033 - , +FM, patient denies contractions, vaginal bleeding, loss of fluid.  2033 - Dr. Lopez at bedside to assess, orders received for labs, IV bolus, urinalysis, and ultra sound  2234 - discussed lab and ultrasound results with Dr. Lopez, orders received for CT scan to rule out appendicitis   0310 - Dr. Lopez at bedside to discuss MRI results, orders received to discharge home, patient waiting for  to arrive to pick her up   0400 - patient discharged home

## 2024-08-07 LAB — GBS, EXTERNAL RESULT: NEGATIVE

## 2024-08-10 ENCOUNTER — HOSPITAL ENCOUNTER (EMERGENCY)
Facility: HOSPITAL | Age: 34
Discharge: HOME | End: 2024-08-10
Payer: MEDICARE

## 2024-08-10 VITALS
OXYGEN SATURATION: 98 % | SYSTOLIC BLOOD PRESSURE: 121 MMHG | TEMPERATURE: 98.2 F | DIASTOLIC BLOOD PRESSURE: 78 MMHG | RESPIRATION RATE: 20 BRPM | HEART RATE: 86 BPM

## 2024-08-10 LAB
ALBUMIN SERPL-MCNC: 2.4 G/DL (ref 3.5–5)
ALBUMIN/GLOB SERPL: 0.6 (ref 1.1–2.2)
ALP SERPL-CCNC: 201 U/L (ref 45–117)
ALT SERPL-CCNC: 26 U/L (ref 12–78)
ANION GAP SERPL CALC-SCNC: 6 MMOL/L (ref 5–15)
AST SERPL-CCNC: 18 U/L (ref 15–37)
BASOPHILS # BLD: 0 K/UL (ref 0–0.1)
BASOPHILS NFR BLD: 0 % (ref 0–1)
BILIRUB SERPL-MCNC: 0.4 MG/DL (ref 0.2–1)
BUN SERPL-MCNC: 4 MG/DL (ref 6–20)
BUN/CREAT SERPL: 7 (ref 12–20)
CALCIUM SERPL-MCNC: 8.7 MG/DL (ref 8.5–10.1)
CHLORIDE SERPL-SCNC: 108 MMOL/L (ref 97–108)
CO2 SERPL-SCNC: 22 MMOL/L (ref 21–32)
CREAT SERPL-MCNC: 0.57 MG/DL (ref 0.55–1.02)
CREAT UR-MCNC: 26.8 MG/DL
DIFFERENTIAL METHOD BLD: ABNORMAL
EOSINOPHIL # BLD: 0.1 K/UL (ref 0–0.4)
EOSINOPHIL NFR BLD: 1 % (ref 0–7)
ERYTHROCYTE [DISTWIDTH] IN BLOOD BY AUTOMATED COUNT: 13.6 % (ref 11.5–14.5)
GLOBULIN SER CALC-MCNC: 4.3 G/DL (ref 2–4)
GLUCOSE SERPL-MCNC: 89 MG/DL (ref 65–100)
HCT VFR BLD AUTO: 31.4 % (ref 35–47)
HGB BLD-MCNC: 10.3 G/DL (ref 11.5–16)
IMM GRANULOCYTES # BLD AUTO: 0 K/UL (ref 0–0.04)
IMM GRANULOCYTES NFR BLD AUTO: 0 % (ref 0–0.5)
LYMPHOCYTES # BLD: 2.6 K/UL (ref 0.8–3.5)
LYMPHOCYTES NFR BLD: 30 % (ref 12–49)
MCH RBC QN AUTO: 27.2 PG (ref 26–34)
MCHC RBC AUTO-ENTMCNC: 32.8 G/DL (ref 30–36.5)
MCV RBC AUTO: 83.1 FL (ref 80–99)
MONOCYTES # BLD: 0.5 K/UL (ref 0–1)
MONOCYTES NFR BLD: 5 % (ref 5–13)
NEUTS SEG # BLD: 5.6 K/UL (ref 1.8–8)
NEUTS SEG NFR BLD: 64 % (ref 32–75)
NRBC # BLD: 0 K/UL (ref 0–0.01)
NRBC BLD-RTO: 0 PER 100 WBC
PLATELET # BLD AUTO: 318 K/UL (ref 150–400)
PMV BLD AUTO: 11.1 FL (ref 8.9–12.9)
POTASSIUM SERPL-SCNC: 3.8 MMOL/L (ref 3.5–5.1)
PROT SERPL-MCNC: 6.7 G/DL (ref 6.4–8.2)
PROT UR-MCNC: 5 MG/DL (ref 0–11.9)
PROT/CREAT UR-RTO: 0.2
RBC # BLD AUTO: 3.78 M/UL (ref 3.8–5.2)
SODIUM SERPL-SCNC: 136 MMOL/L (ref 136–145)
WBC # BLD AUTO: 8.8 K/UL (ref 3.6–11)

## 2024-08-10 PROCEDURE — 36415 COLL VENOUS BLD VENIPUNCTURE: CPT

## 2024-08-10 PROCEDURE — 84156 ASSAY OF PROTEIN URINE: CPT

## 2024-08-10 PROCEDURE — 4500000002 HC ER NO CHARGE

## 2024-08-10 PROCEDURE — 85025 COMPLETE CBC W/AUTO DIFF WBC: CPT

## 2024-08-10 PROCEDURE — 82570 ASSAY OF URINE CREATININE: CPT

## 2024-08-10 PROCEDURE — 99285 EMERGENCY DEPT VISIT HI MDM: CPT

## 2024-08-10 PROCEDURE — 80053 COMPREHEN METABOLIC PANEL: CPT

## 2024-08-10 PROCEDURE — 6370000000 HC RX 637 (ALT 250 FOR IP): Performed by: OBSTETRICS & GYNECOLOGY

## 2024-08-10 RX ORDER — ONDANSETRON 4 MG/1
4 TABLET, ORALLY DISINTEGRATING ORAL ONCE
Status: COMPLETED | OUTPATIENT
Start: 2024-08-10 | End: 2024-08-10

## 2024-08-10 RX ADMIN — ONDANSETRON 4 MG: 4 TABLET, ORALLY DISINTEGRATING ORAL at 19:46

## 2024-08-10 ASSESSMENT — ENCOUNTER SYMPTOMS: NAUSEA: 1

## 2024-08-10 NOTE — ED TRIAGE NOTES
Pt arrives reporting blurred vision, elevated blood pressures at home (130/92), frontal headache and nausea. Pt reports that she was nishant painfully approximately every 10 minutes last night but they has subsided at this time. Pt reports fetal movement. Denies any vaginal bleeding or leaking of fluid. Pt states that she has covid last week but her symptoms have improved.     1955- Labs drawn and urine sample obtained.     2000- Bedside shift change report given to Colette Morejon RN (oncoming nurse) by Jigna Serrato RN (offgoing nurse). Report included the following information Nurse Handoff Report, Index, Intake/Output, MAR, and Recent Results.

## 2024-08-10 NOTE — ED PROVIDER NOTES
33 y/o  2 36 weeks 5 days presents with c/o lightheadedness, HA, nausea, back pain since yesterday. Bps at home 122/100 mmHg  Diagnosed with Covid on    Mentions contractions and back pain is gone    BP here 132/90 mmHg    Pregnancy complicated by Morbid Obesity, Type 2 DM , Previous CD, exposure to Covid,PP depression           Chief Complaint   Patient presents with    Blurred Vision       Past Medical History:   Diagnosis Date    Abnormal pap 2014+ 2015    LGSIL pap    Anxiety     Chronic pain     Depression     Diabetes (HCC)     TYPE 2    HPV vaccine counseling     gardasil series complete     HSV-2 infection     Lumbar epidural mass (HCC)     lumbar epidural lipomatosis    Missed  2021    Morbid jealousy (HCC)     Morbid obesity with body mass index (BMI) of 45.0 to 49.9 in adult ([facility])     PCOS (polycystic ovarian syndrome)     Polycystic disease, ovaries     Prediabetes     Sleep apnea     Sleep disorder     sleep apnea    UTI (urinary tract infection)     Vitamin D deficiency        Past Surgical History:   Procedure Laterality Date    COLPOSCOPY  2014 + 2015    DOMINIQUE I; 2015 neg path    DILATION AND CURETTAGE OF UTERUS N/A          Family History   Problem Relation Age of Onset    Heart Disease Father     No Known Problems Mother     Diabetes Father        Social History     Socioeconomic History    Marital status:      Spouse name: Not on file    Number of children: Not on file    Years of education: Not on file    Highest education level: Not on file   Occupational History    Not on file   Tobacco Use    Smoking status: Never    Smokeless tobacco: Never   Substance and Sexual Activity    Alcohol use: Not Currently    Drug use: Never    Sexual activity: Not on file   Other Topics Concern    Not on file   Social History Narrative    ** Merged History Encounter **          Social Determinants of Health     Financial Resource Strain: Not on file   Food Insecurity: Not on  protein/cr ratio  Tylenol PO PRN  Serial Bps      Marika Lopez MD       9.05 pm  Bps WNL  Labs WNL  Pr/Cr ratio 0.2  PO hydration advised  RTC next week as scheduled        Marika Lopez MD

## 2024-08-11 NOTE — ED TRIAGE NOTES
1945Bedside shift change report given to JACKIE Morejon RN (oncoming nurse) by KAMERON Serrato RN (offgoing nurse). Report included the following information Nurse Handoff Report, ED SBAR, and Recent Results.     1951 EFM off, reactive NST    2115 MD notified of lab results and serial blood pressures. Plan for discharge at this time.     2130 Discharge paperwork given to patient, all signs and symptoms of pre eclampsia reviewed. Also given signs of labor and when to contact physician or return to hospital with symptoms. Patient verbalized understanding and agrees with plan for discharge at this time. Patient to follow up on Tuesday with primary OBGYN.

## 2024-08-26 ENCOUNTER — HOSPITAL ENCOUNTER (OUTPATIENT)
Facility: HOSPITAL | Age: 34
Discharge: HOME | End: 2024-08-26
Attending: OBSTETRICS & GYNECOLOGY | Admitting: OBSTETRICS & GYNECOLOGY
Payer: MEDICARE

## 2024-08-26 VITALS
OXYGEN SATURATION: 96 % | WEIGHT: 284 LBS | SYSTOLIC BLOOD PRESSURE: 122 MMHG | RESPIRATION RATE: 16 BRPM | HEIGHT: 65 IN | HEART RATE: 93 BPM | DIASTOLIC BLOOD PRESSURE: 79 MMHG | TEMPERATURE: 98.7 F | BODY MASS INDEX: 47.32 KG/M2

## 2024-08-26 LAB
ABO + RH BLD: NORMAL
BASOPHILS # BLD: 0 K/UL (ref 0–0.1)
BASOPHILS NFR BLD: 0 % (ref 0–1)
BLOOD GROUP ANTIBODIES SERPL: NORMAL
DIFFERENTIAL METHOD BLD: ABNORMAL
EOSINOPHIL # BLD: 0.1 K/UL (ref 0–0.4)
EOSINOPHIL NFR BLD: 1 % (ref 0–7)
ERYTHROCYTE [DISTWIDTH] IN BLOOD BY AUTOMATED COUNT: 14.6 % (ref 11.5–14.5)
HCT VFR BLD AUTO: 29.8 % (ref 35–47)
HGB BLD-MCNC: 9.7 G/DL (ref 11.5–16)
IMM GRANULOCYTES # BLD AUTO: 0 K/UL (ref 0–0.04)
IMM GRANULOCYTES NFR BLD AUTO: 0 % (ref 0–0.5)
LYMPHOCYTES # BLD: 2.9 K/UL (ref 0.8–3.5)
LYMPHOCYTES NFR BLD: 33 % (ref 12–49)
MCH RBC QN AUTO: 26.8 PG (ref 26–34)
MCHC RBC AUTO-ENTMCNC: 32.6 G/DL (ref 30–36.5)
MCV RBC AUTO: 82.3 FL (ref 80–99)
MONOCYTES # BLD: 0.5 K/UL (ref 0–1)
MONOCYTES NFR BLD: 5 % (ref 5–13)
NEUTS SEG # BLD: 5.3 K/UL (ref 1.8–8)
NEUTS SEG NFR BLD: 61 % (ref 32–75)
NRBC # BLD: 0 K/UL (ref 0–0.01)
NRBC BLD-RTO: 0 PER 100 WBC
PLATELET # BLD AUTO: 270 K/UL (ref 150–400)
PMV BLD AUTO: 11.4 FL (ref 8.9–12.9)
RBC # BLD AUTO: 3.62 M/UL (ref 3.8–5.2)
RPR SER QL: NONREACTIVE
SPECIMEN EXP DATE BLD: NORMAL
WBC # BLD AUTO: 8.8 K/UL (ref 3.6–11)

## 2024-08-26 PROCEDURE — 86592 SYPHILIS TEST NON-TREP QUAL: CPT

## 2024-08-26 PROCEDURE — 36415 COLL VENOUS BLD VENIPUNCTURE: CPT

## 2024-08-26 PROCEDURE — 86901 BLOOD TYPING SEROLOGIC RH(D): CPT

## 2024-08-26 PROCEDURE — 86900 BLOOD TYPING SEROLOGIC ABO: CPT

## 2024-08-26 PROCEDURE — 86850 RBC ANTIBODY SCREEN: CPT

## 2024-08-26 PROCEDURE — 85025 COMPLETE CBC W/AUTO DIFF WBC: CPT

## 2024-08-26 RX ORDER — LANOLIN ALCOHOL/MO/W.PET/CERES
400 CREAM (GRAM) TOPICAL DAILY
COMMUNITY

## 2024-08-27 ENCOUNTER — ANESTHESIA EVENT (OUTPATIENT)
Facility: HOSPITAL | Age: 34
End: 2024-08-27
Payer: MEDICARE

## 2024-08-27 ENCOUNTER — HOSPITAL ENCOUNTER (INPATIENT)
Facility: HOSPITAL | Age: 34
LOS: 3 days | Discharge: HOME | End: 2024-08-30
Attending: OBSTETRICS & GYNECOLOGY | Admitting: OBSTETRICS & GYNECOLOGY
Payer: MEDICARE

## 2024-08-27 ENCOUNTER — ANESTHESIA (OUTPATIENT)
Facility: HOSPITAL | Age: 34
End: 2024-08-27
Payer: MEDICARE

## 2024-08-27 DIAGNOSIS — Z98.891 HISTORY OF CESAREAN SECTION: Primary | ICD-10-CM

## 2024-08-27 PROCEDURE — 2709999900 HC NON-CHARGEABLE SUPPLY: Performed by: OBSTETRICS & GYNECOLOGY

## 2024-08-27 PROCEDURE — 3609079900 HC CESAREAN SECTION: Performed by: OBSTETRICS & GYNECOLOGY

## 2024-08-27 PROCEDURE — 3700000001 HC ADD 15 MINUTES (ANESTHESIA): Performed by: OBSTETRICS & GYNECOLOGY

## 2024-08-27 PROCEDURE — 2580000003 HC RX 258: Performed by: NURSE ANESTHETIST, CERTIFIED REGISTERED

## 2024-08-27 PROCEDURE — 2500000003 HC RX 250 WO HCPCS: Performed by: OBSTETRICS & GYNECOLOGY

## 2024-08-27 PROCEDURE — 1120000000 HC RM PRIVATE OB

## 2024-08-27 PROCEDURE — 7100000000 HC PACU RECOVERY - FIRST 15 MIN: Performed by: OBSTETRICS & GYNECOLOGY

## 2024-08-27 PROCEDURE — 6360000002 HC RX W HCPCS: Performed by: ANESTHESIOLOGY

## 2024-08-27 PROCEDURE — 2580000003 HC RX 258: Performed by: ANESTHESIOLOGY

## 2024-08-27 PROCEDURE — 2720000010 HC SURG SUPPLY STERILE: Performed by: OBSTETRICS & GYNECOLOGY

## 2024-08-27 PROCEDURE — 6360000002 HC RX W HCPCS: Performed by: OBSTETRICS & GYNECOLOGY

## 2024-08-27 PROCEDURE — 3700000000 HC ANESTHESIA ATTENDED CARE: Performed by: OBSTETRICS & GYNECOLOGY

## 2024-08-27 PROCEDURE — 2500000003 HC RX 250 WO HCPCS: Performed by: ANESTHESIOLOGY

## 2024-08-27 PROCEDURE — 6360000002 HC RX W HCPCS: Performed by: NURSE ANESTHETIST, CERTIFIED REGISTERED

## 2024-08-27 PROCEDURE — 6370000000 HC RX 637 (ALT 250 FOR IP): Performed by: OBSTETRICS & GYNECOLOGY

## 2024-08-27 PROCEDURE — 2580000003 HC RX 258: Performed by: OBSTETRICS & GYNECOLOGY

## 2024-08-27 PROCEDURE — 7100000001 HC PACU RECOVERY - ADDTL 15 MIN: Performed by: OBSTETRICS & GYNECOLOGY

## 2024-08-27 PROCEDURE — 4A1HXCZ MONITORING OF PRODUCTS OF CONCEPTION, CARDIAC RATE, EXTERNAL APPROACH: ICD-10-PCS | Performed by: OBSTETRICS & GYNECOLOGY

## 2024-08-27 RX ORDER — KETOROLAC TROMETHAMINE 30 MG/ML
30 INJECTION, SOLUTION INTRAMUSCULAR; INTRAVENOUS EVERY 6 HOURS
Status: COMPLETED | OUTPATIENT
Start: 2024-08-27 | End: 2024-08-28

## 2024-08-27 RX ORDER — MORPHINE SULFATE 1 MG/ML
INJECTION, SOLUTION EPIDURAL; INTRATHECAL; INTRAVENOUS
Status: COMPLETED | OUTPATIENT
Start: 2024-08-27 | End: 2024-08-27

## 2024-08-27 RX ORDER — IBUPROFEN 400 MG/1
800 TABLET, FILM COATED ORAL EVERY 8 HOURS
Status: DISCONTINUED | OUTPATIENT
Start: 2024-08-28 | End: 2024-08-30 | Stop reason: HOSPADM

## 2024-08-27 RX ORDER — SWAB
1 SWAB, NON-MEDICATED MISCELLANEOUS DAILY
Status: DISCONTINUED | OUTPATIENT
Start: 2024-08-27 | End: 2024-08-30 | Stop reason: HOSPADM

## 2024-08-27 RX ORDER — SODIUM CHLORIDE 0.9 % (FLUSH) 0.9 %
5-40 SYRINGE (ML) INJECTION EVERY 12 HOURS SCHEDULED
Status: DISCONTINUED | OUTPATIENT
Start: 2024-08-27 | End: 2024-08-27

## 2024-08-27 RX ORDER — BUPIVACAINE HYDROCHLORIDE 7.5 MG/ML
INJECTION, SOLUTION EPIDURAL; RETROBULBAR
Status: COMPLETED | OUTPATIENT
Start: 2024-08-27 | End: 2024-08-27

## 2024-08-27 RX ORDER — DOCUSATE SODIUM 100 MG/1
100 CAPSULE, LIQUID FILLED ORAL 2 TIMES DAILY
Status: DISCONTINUED | OUTPATIENT
Start: 2024-08-27 | End: 2024-08-30 | Stop reason: HOSPADM

## 2024-08-27 RX ORDER — ONDANSETRON 2 MG/ML
4 INJECTION INTRAMUSCULAR; INTRAVENOUS EVERY 6 HOURS PRN
Status: DISCONTINUED | OUTPATIENT
Start: 2024-08-27 | End: 2024-08-27 | Stop reason: SDUPTHER

## 2024-08-27 RX ORDER — KETOROLAC TROMETHAMINE 30 MG/ML
INJECTION, SOLUTION INTRAMUSCULAR; INTRAVENOUS PRN
Status: DISCONTINUED | OUTPATIENT
Start: 2024-08-27 | End: 2024-08-27 | Stop reason: SDUPTHER

## 2024-08-27 RX ORDER — ONDANSETRON 2 MG/ML
4 INJECTION INTRAMUSCULAR; INTRAVENOUS EVERY 6 HOURS PRN
Status: DISCONTINUED | OUTPATIENT
Start: 2024-08-27 | End: 2024-08-30 | Stop reason: HOSPADM

## 2024-08-27 RX ORDER — SODIUM CHLORIDE 0.9 % (FLUSH) 0.9 %
5-40 SYRINGE (ML) INJECTION PRN
Status: DISCONTINUED | OUTPATIENT
Start: 2024-08-27 | End: 2024-08-30 | Stop reason: HOSPADM

## 2024-08-27 RX ORDER — HYDROMORPHONE HYDROCHLORIDE 2 MG/1
2 TABLET ORAL EVERY 4 HOURS PRN
Status: DISCONTINUED | OUTPATIENT
Start: 2024-08-27 | End: 2024-08-30 | Stop reason: HOSPADM

## 2024-08-27 RX ORDER — HYDROMORPHONE HYDROCHLORIDE 1 MG/ML
0.5 INJECTION, SOLUTION INTRAMUSCULAR; INTRAVENOUS; SUBCUTANEOUS ONCE
Status: COMPLETED | OUTPATIENT
Start: 2024-08-27 | End: 2024-08-27

## 2024-08-27 RX ORDER — SODIUM CHLORIDE, SODIUM LACTATE, POTASSIUM CHLORIDE, CALCIUM CHLORIDE 600; 310; 30; 20 MG/100ML; MG/100ML; MG/100ML; MG/100ML
INJECTION, SOLUTION INTRAVENOUS CONTINUOUS
Status: DISCONTINUED | OUTPATIENT
Start: 2024-08-27 | End: 2024-08-27

## 2024-08-27 RX ORDER — ONDANSETRON 2 MG/ML
4 INJECTION INTRAMUSCULAR; INTRAVENOUS EVERY 6 HOURS PRN
Status: CANCELLED | OUTPATIENT
Start: 2024-08-27 | End: 2024-08-28

## 2024-08-27 RX ORDER — METFORMIN HCL 500 MG
1000 TABLET, EXTENDED RELEASE 24 HR ORAL
Status: DISCONTINUED | OUTPATIENT
Start: 2024-08-27 | End: 2024-08-30 | Stop reason: HOSPADM

## 2024-08-27 RX ORDER — MODIFIED LANOLIN
OINTMENT (GRAM) TOPICAL
Status: DISCONTINUED | OUTPATIENT
Start: 2024-08-27 | End: 2024-08-30 | Stop reason: HOSPADM

## 2024-08-27 RX ORDER — DIPHENHYDRAMINE HYDROCHLORIDE 50 MG/ML
INJECTION INTRAMUSCULAR; INTRAVENOUS PRN
Status: DISCONTINUED | OUTPATIENT
Start: 2024-08-27 | End: 2024-08-27 | Stop reason: SDUPTHER

## 2024-08-27 RX ORDER — SODIUM CHLORIDE, SODIUM LACTATE, POTASSIUM CHLORIDE, AND CALCIUM CHLORIDE .6; .31; .03; .02 G/100ML; G/100ML; G/100ML; G/100ML
1000 INJECTION, SOLUTION INTRAVENOUS ONCE
Status: DISCONTINUED | OUTPATIENT
Start: 2024-08-27 | End: 2024-08-27

## 2024-08-27 RX ORDER — DULOXETIN HYDROCHLORIDE 20 MG/1
20 CAPSULE, DELAYED RELEASE ORAL DAILY
Status: DISCONTINUED | OUTPATIENT
Start: 2024-08-27 | End: 2024-08-30 | Stop reason: HOSPADM

## 2024-08-27 RX ORDER — BUPIVACAINE HYDROCHLORIDE 5 MG/ML
30 INJECTION, SOLUTION EPIDURAL; INTRACAUDAL ONCE
Status: DISCONTINUED | OUTPATIENT
Start: 2024-08-27 | End: 2024-08-27

## 2024-08-27 RX ORDER — SODIUM CHLORIDE 0.9 % (FLUSH) 0.9 %
10 SYRINGE (ML) INJECTION PRN
Status: DISCONTINUED | OUTPATIENT
Start: 2024-08-27 | End: 2024-08-27

## 2024-08-27 RX ORDER — SODIUM CHLORIDE, SODIUM LACTATE, POTASSIUM CHLORIDE, CALCIUM CHLORIDE 600; 310; 30; 20 MG/100ML; MG/100ML; MG/100ML; MG/100ML
INJECTION, SOLUTION INTRAVENOUS CONTINUOUS PRN
Status: DISCONTINUED | OUTPATIENT
Start: 2024-08-27 | End: 2024-08-27 | Stop reason: SDUPTHER

## 2024-08-27 RX ORDER — NALOXONE HYDROCHLORIDE 0.4 MG/ML
INJECTION, SOLUTION INTRAMUSCULAR; INTRAVENOUS; SUBCUTANEOUS PRN
Status: CANCELLED | OUTPATIENT
Start: 2024-08-27 | End: 2024-08-28

## 2024-08-27 RX ORDER — DIPHENHYDRAMINE HYDROCHLORIDE 50 MG/ML
12.5 INJECTION INTRAMUSCULAR; INTRAVENOUS EVERY 4 HOURS PRN
Status: CANCELLED | OUTPATIENT
Start: 2024-08-27

## 2024-08-27 RX ORDER — SODIUM CHLORIDE 9 MG/ML
INJECTION, SOLUTION INTRAVENOUS PRN
Status: DISCONTINUED | OUTPATIENT
Start: 2024-08-27 | End: 2024-08-27

## 2024-08-27 RX ORDER — FENTANYL CITRATE 50 UG/ML
INJECTION, SOLUTION INTRAMUSCULAR; INTRAVENOUS
Status: COMPLETED | OUTPATIENT
Start: 2024-08-27 | End: 2024-08-27

## 2024-08-27 RX ORDER — ONDANSETRON 4 MG/1
4 TABLET, ORALLY DISINTEGRATING ORAL EVERY 8 HOURS PRN
Status: DISCONTINUED | OUTPATIENT
Start: 2024-08-27 | End: 2024-08-30 | Stop reason: HOSPADM

## 2024-08-27 RX ORDER — ONDANSETRON 2 MG/ML
INJECTION INTRAMUSCULAR; INTRAVENOUS PRN
Status: DISCONTINUED | OUTPATIENT
Start: 2024-08-27 | End: 2024-08-27 | Stop reason: SDUPTHER

## 2024-08-27 RX ORDER — ACETAMINOPHEN 500 MG
1000 TABLET ORAL EVERY 8 HOURS SCHEDULED
Status: DISCONTINUED | OUTPATIENT
Start: 2024-08-27 | End: 2024-08-30 | Stop reason: HOSPADM

## 2024-08-27 RX ORDER — SODIUM CHLORIDE 0.9 % (FLUSH) 0.9 %
5-40 SYRINGE (ML) INJECTION EVERY 12 HOURS SCHEDULED
Status: DISCONTINUED | OUTPATIENT
Start: 2024-08-27 | End: 2024-08-30 | Stop reason: HOSPADM

## 2024-08-27 RX ORDER — HYDROMORPHONE HYDROCHLORIDE 1 MG/ML
0.25 INJECTION, SOLUTION INTRAMUSCULAR; INTRAVENOUS; SUBCUTANEOUS
Status: DISPENSED | OUTPATIENT
Start: 2024-08-27 | End: 2024-08-28

## 2024-08-27 RX ORDER — ACETAMINOPHEN 325 MG/1
975 TABLET ORAL ONCE
Status: COMPLETED | OUTPATIENT
Start: 2024-08-27 | End: 2024-08-27

## 2024-08-27 RX ORDER — SODIUM CHLORIDE 9 MG/ML
INJECTION, SOLUTION INTRAVENOUS PRN
Status: DISCONTINUED | OUTPATIENT
Start: 2024-08-27 | End: 2024-08-30 | Stop reason: HOSPADM

## 2024-08-27 RX ORDER — DIPHENHYDRAMINE HCL 25 MG
25 CAPSULE ORAL EVERY 6 HOURS PRN
Status: DISCONTINUED | OUTPATIENT
Start: 2024-08-27 | End: 2024-08-30 | Stop reason: HOSPADM

## 2024-08-27 RX ORDER — DIPHENHYDRAMINE HYDROCHLORIDE 50 MG/ML
25 INJECTION INTRAMUSCULAR; INTRAVENOUS EVERY 6 HOURS PRN
Status: DISCONTINUED | OUTPATIENT
Start: 2024-08-27 | End: 2024-08-30 | Stop reason: HOSPADM

## 2024-08-27 RX ADMIN — FENTANYL CITRATE 15 MCG: 50 INJECTION, SOLUTION INTRAMUSCULAR; INTRAVENOUS at 10:21

## 2024-08-27 RX ADMIN — Medication 909 ML/HR: at 10:46

## 2024-08-27 RX ADMIN — SODIUM CHLORIDE, POTASSIUM CHLORIDE, SODIUM LACTATE AND CALCIUM CHLORIDE: 600; 310; 30; 20 INJECTION, SOLUTION INTRAVENOUS at 09:45

## 2024-08-27 RX ADMIN — DOCUSATE SODIUM 100 MG: 100 CAPSULE, LIQUID FILLED ORAL at 16:15

## 2024-08-27 RX ADMIN — KETOROLAC TROMETHAMINE 30 MG: 30 INJECTION, SOLUTION INTRAMUSCULAR at 16:15

## 2024-08-27 RX ADMIN — SODIUM CHLORIDE, POTASSIUM CHLORIDE, SODIUM LACTATE AND CALCIUM CHLORIDE: 600; 310; 30; 20 INJECTION, SOLUTION INTRAVENOUS at 11:32

## 2024-08-27 RX ADMIN — ACETAMINOPHEN 975 MG: 325 TABLET ORAL at 09:26

## 2024-08-27 RX ADMIN — Medication 1 TABLET: at 16:15

## 2024-08-27 RX ADMIN — KETOROLAC TROMETHAMINE 30 MG: 30 INJECTION, SOLUTION INTRAMUSCULAR at 11:11

## 2024-08-27 RX ADMIN — HYDROMORPHONE HYDROCHLORIDE 0.25 MG: 1 INJECTION, SOLUTION INTRAMUSCULAR; INTRAVENOUS; SUBCUTANEOUS at 13:54

## 2024-08-27 RX ADMIN — PHENYLEPHRINE HYDROCHLORIDE 40 MCG/MIN: 10 INJECTION INTRAVENOUS at 10:20

## 2024-08-27 RX ADMIN — FAMOTIDINE 20 MG: 10 INJECTION, SOLUTION INTRAVENOUS at 09:26

## 2024-08-27 RX ADMIN — MORPHINE SULFATE 0.15 MG: 1 INJECTION EPIDURAL; INTRATHECAL; INTRAVENOUS at 10:21

## 2024-08-27 RX ADMIN — ONDANSETRON 4 MG: 2 INJECTION INTRAMUSCULAR; INTRAVENOUS at 10:22

## 2024-08-27 RX ADMIN — SODIUM CHLORIDE, POTASSIUM CHLORIDE, SODIUM LACTATE AND CALCIUM CHLORIDE: 600; 310; 30; 20 INJECTION, SOLUTION INTRAVENOUS at 13:56

## 2024-08-27 RX ADMIN — METFORMIN HYDROCHLORIDE 1000 MG: 500 TABLET, EXTENDED RELEASE ORAL at 17:28

## 2024-08-27 RX ADMIN — ACETAMINOPHEN 1000 MG: 500 TABLET ORAL at 16:15

## 2024-08-27 RX ADMIN — DULOXETINE HYDROCHLORIDE 20 MG: 20 CAPSULE, DELAYED RELEASE ORAL at 16:15

## 2024-08-27 RX ADMIN — HYDROMORPHONE HYDROCHLORIDE 0.5 MG: 1 INJECTION, SOLUTION INTRAMUSCULAR; INTRAVENOUS; SUBCUTANEOUS at 17:30

## 2024-08-27 RX ADMIN — KETOROLAC TROMETHAMINE 30 MG: 30 INJECTION, SOLUTION INTRAMUSCULAR at 22:27

## 2024-08-27 RX ADMIN — DIPHENHYDRAMINE HYDROCHLORIDE 25 MG: 50 INJECTION INTRAMUSCULAR; INTRAVENOUS at 11:11

## 2024-08-27 RX ADMIN — SODIUM CHLORIDE 3000 MG: 900 INJECTION INTRAVENOUS at 09:44

## 2024-08-27 RX ADMIN — DIPHENHYDRAMINE HYDROCHLORIDE 25 MG: 50 INJECTION INTRAMUSCULAR; INTRAVENOUS at 11:24

## 2024-08-27 RX ADMIN — BUPIVACAINE HYDROCHLORIDE 12 MG: 7.5 INJECTION, SOLUTION EPIDURAL; RETROBULBAR at 10:21

## 2024-08-27 ASSESSMENT — PAIN - FUNCTIONAL ASSESSMENT: PAIN_FUNCTIONAL_ASSESSMENT: ACTIVITIES ARE NOT PREVENTED

## 2024-08-27 ASSESSMENT — PAIN DESCRIPTION - DESCRIPTORS
DESCRIPTORS: BURNING
DESCRIPTORS: BURNING
DESCRIPTORS: SORE

## 2024-08-27 ASSESSMENT — PAIN SCALES - GENERAL
PAINLEVEL_OUTOF10: 9
PAINLEVEL_OUTOF10: 6
PAINLEVEL_OUTOF10: 9
PAINLEVEL_OUTOF10: 5

## 2024-08-27 ASSESSMENT — PAIN DESCRIPTION - LOCATION
LOCATION: INCISION
LOCATION: INCISION
LOCATION: ABDOMEN;INCISION
LOCATION: ABDOMEN;INCISION

## 2024-08-27 NOTE — ANESTHESIA POSTPROCEDURE EVALUATION
Post-Anesthesia Evaluation and Assessment    Patient: Toya Godoy MRN: [id number]  SSN: [id number]    YOB: 1990  Age: 34 y.o.  Sex: female      I have evaluated the patient and they are stable and ready for discharge from the PACU.     Cardiovascular Function/Vital Signs  Visit Vitals  /69   Pulse 71   Temp 97.9 °F (36.6 °C) (Oral)   Resp 16   SpO2 92%   Breastfeeding Unknown       Patient is status post Epidural anesthesia for Procedure(s):   SECTION (E R A S).    Nausea/Vomiting: None    Postoperative hydration reviewed and adequate.    Pain:  Managed    Neurological Status:   At baseline    Mental Status, Level of Consciousness: Alert and  oriented to person, place, and time    Pulmonary Status:   Adequate oxygenation and airway patent    Complications related to anesthesia: None    Post-anesthesia assessment completed. No concerns    Signed By: Max Bernard MD     2024

## 2024-08-27 NOTE — ANESTHESIA PRE PROCEDURE
Encounters:   08/26/24 122/79   08/10/24 121/78   04/29/24 138/65       NPO Status:                                                                                 BMI:   Wt Readings from Last 3 Encounters:   08/26/24 128.8 kg (284 lb)   08/11/22 (!) 142.4 kg (314 lb)   08/19/21 (!) 141.3 kg (311 lb 9.6 oz)     There is no height or weight on file to calculate BMI.    CBC:   Lab Results   Component Value Date/Time    WBC 8.8 08/26/2024 09:28 AM    RBC 3.62 08/26/2024 09:28 AM    HGB 9.7 08/26/2024 09:28 AM    HCT 29.8 08/26/2024 09:28 AM    MCV 82.3 08/26/2024 09:28 AM    RDW 14.6 08/26/2024 09:28 AM     08/26/2024 09:28 AM       CMP:   Lab Results   Component Value Date/Time     08/10/2024 07:50 PM    K 3.8 08/10/2024 07:50 PM     08/10/2024 07:50 PM    CO2 22 08/10/2024 07:50 PM    BUN 4 08/10/2024 07:50 PM    CREATININE 0.57 08/10/2024 07:50 PM    GFRAA >60 07/26/2021 01:13 AM    AGRATIO 0.6 02/28/2023 08:34 AM    LABGLOM >90 08/10/2024 07:50 PM    LABGLOM >60 02/28/2023 08:34 AM    LABGLOM 125 12/12/2022 10:24 AM    GLUCOSE 89 08/10/2024 07:50 PM    CALCIUM 8.7 08/10/2024 07:50 PM    BILITOT 0.4 08/10/2024 07:50 PM    ALKPHOS 201 08/10/2024 07:50 PM    ALKPHOS 112 02/28/2023 08:34 AM    AST 18 08/10/2024 07:50 PM    ALT 26 08/10/2024 07:50 PM       POC Tests: No results for input(s): \"POCGLU\", \"POCNA\", \"POCK\", \"POCCL\", \"POCBUN\", \"POCHEMO\", \"POCHCT\" in the last 72 hours.    Coags: No results found for: \"PROTIME\", \"INR\", \"APTT\"    HCG (If Applicable):   Lab Results   Component Value Date    HCGQUANT 239 (H) 12/28/2020        ABGs: No results found for: \"PHART\", \"PO2ART\", \"KEQ6SQX\", \"PRW3DJK\", \"BEART\", \"B1HNSYPY\"     Type & Screen (If Applicable):  No results found for: \"LABABO\"    Drug/Infectious Status (If Applicable):  No results found for: \"HIV\", \"HEPCAB\"    COVID-19 Screening (If Applicable): No results found for: \"COVID19\"        Anesthesia Evaluation  Patient summary reviewed and Nursing  notes reviewed  Airway: Mallampati: II  TM distance: >3 FB   Neck ROM: full  Mouth opening: > = 3 FB   Dental: normal exam         Pulmonary: breath sounds clear to auscultation  (+)     sleep apnea:                                  Cardiovascular:Negative CV ROS  Exercise tolerance: good (>4 METS)          Rhythm: regular  Rate: normal                    Neuro/Psych:   (+) psychiatric history:            GI/Hepatic/Renal: Neg GI/Hepatic/Renal ROS            Endo/Other: Negative Endo/Other ROS   (+) Diabetes.                 Abdominal: normal exam            Vascular: negative vascular ROS.         Other Findings:             Anesthesia Plan      spinal     ASA 2             Anesthetic plan and risks discussed with patient.    Use of blood products discussed with patient whom consented to blood products.    Plan discussed with CRNA.          Post-op pain plan if not by surgeon: intrathecal narcotics            Max Benrard MD   8/27/2024

## 2024-08-27 NOTE — H&P
History & Physical    Name: Toya Godoy MRN: [id number]  SSN: [id number]    YOB: 1990  Age: 34 y.o.  Sex: female      Subjective:     Chief Complaint:  Pregnancy and history of prior  section, DM2 on insulin and obesity    Estimated Date of Delivery: 24  OB History    Para Term  AB Living   4   0 1 2 1   SAB IAB Ectopic Molar Multiple Live Births                    # Outcome Date GA Lbr Rene/2nd Weight Sex Type Anes PTL Lv   1 Current                Ms. Godoy admitted with pregnancy at 39w1d for  section due to previous  section.     Prenatal course was notable for the followin) H/o  section desires repeat  2) DMT2 on NPH bid and metformin - plan to continue metformin only after delivery  3) Morbid obesity, BMI>40, >120kg  4) Anxiety and depression: stable on cymbalta  5) h/o baby with hydrocephaly in first pregnancy  6) h/o HSV on valtrex, no active lesions or prodomal symptoms    Please see prenatal records which have also been sent to Labor and Delivery and added to Epic for details.    Past Medical History:   Diagnosis Date    Abnormal pap 2014+ 2015    LGSIL pap    Abnormal Pap smear of cervix     Anemia     Anxiety     Chronic pain     Depression     Diabetes (HCC)     TYPE 2    HPV vaccine counseling     gardasil series complete     HSV-2 infection     Lumbar epidural mass (HCC)     lumbar epidural lipomatosis    Missed  2021    Morbid jealousy (HCC)     Morbid obesity with body mass index (BMI) of 45.0 to 49.9 in adult (HCC)     PCOS (polycystic ovarian syndrome)     Polycystic disease, ovaries     Postpartum depression     Prediabetes     Sleep apnea     Sleep disorder     sleep apnea    UTI (urinary tract infection)     Vitamin D deficiency      Past Surgical History:   Procedure Laterality Date     SECTION, LOW TRANSVERSE      COLPOSCOPY  2014 + 2015    DOMINIQUE I; 2015 neg path    DILATION AND CURETTAGE

## 2024-08-27 NOTE — PROGRESS NOTES
I have reviewed the notes, assessments, and/or procedures performed by AKIRA Khan RN, I concur with her/his documentation of Toya Godoy.

## 2024-08-27 NOTE — OP NOTE
Operative Note  Patient - Toya Godoy  Medical Record Number - [id number]   YOB: 1990      DATE AND TIME OF PROCEDURE:   2024  10:30     PREOPERATIVE DIAGNOSIS:   History of  section  Morbid obesity  DM2    POSTOPERATIVE DIAGNOSIS:   History of  section  Morbid obesity  DM2  S/p Delivery by  section using transverse incision of lower segment of uterus [O82]    PROCEDURE(S): Procedure(s):  Repeat Low transverse  SECTION (E R A S)     ANESTHESIA: Epidural    SURGEON:  Maria Guadalupe Hobbs MD    ASSISTANT: First Assistant: Lola Ramos RN     QUANTITATIVE BLOOD LOSS AT PROCEDURE END: pending  EBL: 700mL     COMPLICATIONS: none    IMPLANTS: *No implants in the log*    SPECIMENS: None    FINDINGS: Vigorous baby girl      Prophylactic Antibiotics: Ancef    DVT Prophylaxis: Sequential Compression Devices         Fetal Description: celestin     Birth Information:   Information for the patient's :  ELMO Godoy [[id number]]   @[id number]@    Umbilical Cord: 3 vessels present    Placenta:  spontaneous        Procedure Detail:      After proper patient identification and consent, the patient was taken to the operating room, where spinal anesthesia was administered and found to be adequate. Storm catheter had been placed using sterile technique.  The patient was prepped and draped in the normal sterile fashion.  A pannus retractor was applied.  The abdomen was entered using the Pfannenstiel technique. The peritoneum was entered sharply well superior to the bladder without any apparent injury. The mala retractor was placed.  The bladder flap was created without difficulty. A low transverse uterine incision was made with the scalpel and extended with blunt finger dissection. Amniotomy was performed and the fluid was medium amount clear.  The baby’s head was then delivered atraumatically. The nose and mouth were suctioned.  Delayed cord clamping

## 2024-08-27 NOTE — L&D DELIVERY NOTE
Uncomplicated RLTCS - see operative note for details.    ELMO Godoy [[id number]]      Labor Events      Cervical Ripening Date/Time:        Rupture Date/Time:      Rupture Type: Intact       Delivery Details      Delivery Date: 24 Delivery Time: 10:45:00   Delivery Type: , Low Transverse  Trial of Labor?: No   Categorization: Repeat   Priority: scheduled  Indications for : No Labor - Maternal Request       Skin Incision Type: Pfannenstiel  Uterine Incision: Low Transverse       Cord                  Placenta           Lacerations           Blood Loss  Mother: Toya Godoy #[id number]     Start of Mother's Information      Delivery Blood Loss  24 1014 - 24 1139      None                 End of Mother's Information  Mother: Toya Godoy #[id number]                Delivery Providers    Delivering clinician:      Provider Role     Obstetrician     Primary Nurse     Primary  Nurse     NICU Nurse     Neonatologist     Anesthesiologist     Nurse Anesthetist     Nurse Practitioner     Midwife     Nursery Nurse     Respiratory Therapist     Scrub Tech     Assistant Surgeon               Assessment    Living Status: Living        Skin Color:   Heart Rate:   Reflex Irritability:   Muscle Tone:   Respiratory Effort:   Total:            1 Minute:    0    2    1    2    1    6         5 Minute:    1    2    1    2    2    8                                        Apgars Assigned By: KAMERON MONZON RN              [place] Measurements      Birth Length: 50.8 cm     Head Circumference: 35 cm     Abdominal Girth: 32 cm

## 2024-08-27 NOTE — PROGRESS NOTES
0803-pt here for scheduled C/S, repeat, with Dr Hobbs  1320-to [facility]  1330-TRANSFER - OUT REPORT:    Verbal report given to Livan Conrad RN on Toya Godoy  being transferred to [facility] for routine post-op       Report consisted of patient's Situation, Background, Assessment and   Recommendations(SBAR).     Information from the following report(s) Intake/Output, MAR, and Recent Results was reviewed with the receiving nurse.           Lines:   Peripheral IV 08/27/24 Left;Anterior Forearm (Active)        Opportunity for questions and clarification was provided.      Patient transported with:  Registered Nurse

## 2024-08-27 NOTE — ANESTHESIA PROCEDURE NOTES
Spinal Block    Patient location during procedure: OR  Reason for block: primary anesthetic  Staffing  Performed: anesthesiologist   Anesthesiologist: Max Bernard MD  Performed by: Max Bernard MD  Authorized by: Max Bernard MD    Spinal Block  Patient position: sitting  Prep: DuraPrep  Patient monitoring: cardiac monitor, continuous pulse ox, frequent blood pressure checks and oxygen  Approach: midline  Location: L4/L5  Provider prep: mask and sterile gloves  Needle  Needle type: pencil-tip   Needle gauge: 25 G  Needle length: 4 in  Assessment  Sensory level: T4  Events: None  Swirl obtained: Yes  CSF: clear  Attempts: 1  Hemodynamics: stable  Preanesthetic Checklist  Completed: patient identified, IV checked, site marked, risks and benefits discussed, surgical/procedural consents, equipment checked, pre-op evaluation, timeout performed, anesthesia consent given, oxygen available, monitors applied/VS acknowledged and fire risk safety assessment completed and verbalized

## 2024-08-27 NOTE — LACTATION NOTE
This note was copied from a baby's chart.  Initial Lactation Consultation - baby born by  today to a  mom at 39 1/7 weeks gestation. Mom states her first baby was in the NICU and did not latch so she pumped for 8 months. She said she plans to breast feed this baby and she has latched and nursed well. Baby sleeping at the time of my visit.     I encouraged mom to feed baby according to her feeding cues or at least every 3 hours. She will not limit the time the baby is at the breast and will offer both breasts at each feeding.

## 2024-08-28 LAB
ERYTHROCYTE [DISTWIDTH] IN BLOOD BY AUTOMATED COUNT: 14.5 % (ref 11.5–14.5)
GLUCOSE BLD STRIP.AUTO-MCNC: 104 MG/DL (ref 65–117)
HCT VFR BLD AUTO: 26.2 % (ref 35–47)
HGB BLD-MCNC: 8.5 G/DL (ref 11.5–16)
MCH RBC QN AUTO: 27.1 PG (ref 26–34)
MCHC RBC AUTO-ENTMCNC: 32.4 G/DL (ref 30–36.5)
MCV RBC AUTO: 83.4 FL (ref 80–99)
NRBC # BLD: 0 K/UL (ref 0–0.01)
NRBC BLD-RTO: 0 PER 100 WBC
PLATELET # BLD AUTO: 234 K/UL (ref 150–400)
PMV BLD AUTO: 10.8 FL (ref 8.9–12.9)
RBC # BLD AUTO: 3.14 M/UL (ref 3.8–5.2)
SERVICE CMNT-IMP: NORMAL
WBC # BLD AUTO: 7.9 K/UL (ref 3.6–11)

## 2024-08-28 PROCEDURE — 1120000000 HC RM PRIVATE OB

## 2024-08-28 PROCEDURE — 82962 GLUCOSE BLOOD TEST: CPT

## 2024-08-28 PROCEDURE — 2580000003 HC RX 258: Performed by: OBSTETRICS & GYNECOLOGY

## 2024-08-28 PROCEDURE — 85027 COMPLETE CBC AUTOMATED: CPT

## 2024-08-28 PROCEDURE — 6360000002 HC RX W HCPCS: Performed by: OBSTETRICS & GYNECOLOGY

## 2024-08-28 PROCEDURE — 36415 COLL VENOUS BLD VENIPUNCTURE: CPT

## 2024-08-28 PROCEDURE — 6370000000 HC RX 637 (ALT 250 FOR IP): Performed by: OBSTETRICS & GYNECOLOGY

## 2024-08-28 RX ORDER — SIMETHICONE 80 MG
80 TABLET,CHEWABLE ORAL EVERY 6 HOURS PRN
Status: DISCONTINUED | OUTPATIENT
Start: 2024-08-28 | End: 2024-08-30 | Stop reason: HOSPADM

## 2024-08-28 RX ADMIN — DULOXETINE HYDROCHLORIDE 20 MG: 20 CAPSULE, DELAYED RELEASE ORAL at 08:37

## 2024-08-28 RX ADMIN — KETOROLAC TROMETHAMINE 30 MG: 30 INJECTION, SOLUTION INTRAMUSCULAR at 11:10

## 2024-08-28 RX ADMIN — METFORMIN HYDROCHLORIDE 1000 MG: 500 TABLET, EXTENDED RELEASE ORAL at 16:44

## 2024-08-28 RX ADMIN — DOCUSATE SODIUM 100 MG: 100 CAPSULE, LIQUID FILLED ORAL at 21:44

## 2024-08-28 RX ADMIN — SODIUM CHLORIDE 200 MG: 9 INJECTION, SOLUTION INTRAVENOUS at 11:28

## 2024-08-28 RX ADMIN — ACETAMINOPHEN 1000 MG: 500 TABLET ORAL at 08:37

## 2024-08-28 RX ADMIN — ACETAMINOPHEN 1000 MG: 500 TABLET ORAL at 16:44

## 2024-08-28 RX ADMIN — HYDROMORPHONE HYDROCHLORIDE 2 MG: 2 TABLET ORAL at 21:44

## 2024-08-28 RX ADMIN — Medication 1 TABLET: at 08:37

## 2024-08-28 RX ADMIN — DOCUSATE SODIUM 100 MG: 100 CAPSULE, LIQUID FILLED ORAL at 08:36

## 2024-08-28 RX ADMIN — HYDROMORPHONE HYDROCHLORIDE 2 MG: 2 TABLET ORAL at 09:00

## 2024-08-28 RX ADMIN — METFORMIN HYDROCHLORIDE 1000 MG: 500 TABLET, EXTENDED RELEASE ORAL at 08:37

## 2024-08-28 RX ADMIN — IBUPROFEN 800 MG: 400 TABLET ORAL at 18:37

## 2024-08-28 RX ADMIN — ACETAMINOPHEN 1000 MG: 500 TABLET ORAL at 00:19

## 2024-08-28 RX ADMIN — KETOROLAC TROMETHAMINE 30 MG: 30 INJECTION, SOLUTION INTRAMUSCULAR at 04:32

## 2024-08-28 RX ADMIN — SIMETHICONE 80 MG: 80 TABLET, CHEWABLE ORAL at 11:58

## 2024-08-28 ASSESSMENT — PAIN - FUNCTIONAL ASSESSMENT: PAIN_FUNCTIONAL_ASSESSMENT: ACTIVITIES ARE NOT PREVENTED

## 2024-08-28 ASSESSMENT — PAIN DESCRIPTION - ORIENTATION: ORIENTATION: LOWER

## 2024-08-28 ASSESSMENT — PAIN DESCRIPTION - LOCATION
LOCATION: ABDOMEN;INCISION
LOCATION: INCISION

## 2024-08-28 ASSESSMENT — PAIN SCALES - GENERAL
PAINLEVEL_OUTOF10: 8
PAINLEVEL_OUTOF10: 7
PAINLEVEL_OUTOF10: 5

## 2024-08-28 ASSESSMENT — PAIN DESCRIPTION - DESCRIPTORS
DESCRIPTORS: SORE;ACHING
DESCRIPTORS: SORE;ACHING

## 2024-08-28 NOTE — DISCHARGE INSTRUCTIONS
Depression After Childbirth: Care Instructions  It's common to lose sleep, feel irritable, and cry easily during the first few days after childbirth. Hormone changes and the demands of a new baby can cause these \"baby blues.\" If these mood changes last more than 2 weeks, you may have postpartum depression. This is a medical condition that requires treatment.  If you have any of these signs, you may be depressed. See your doctor right away.    You feel very sad or hopeless and lose interest in daily activities.       You sleep too much or not enough.       You feel tired or as if you have no energy.       You eat too much or too little.       You write or talk about death.     Tips to help with postpartum depression        What you can do   Try to go to all of your counseling sessions.  Take medicines as directed.  Eat healthy foods.  Get daily exercise, such as walks.  Try to get some sunlight every day.  Avoid using alcohol or other substances.  Get as much rest as possible.  Connect with friends, and join a support group for new parents.  When should you call for help?   Call 861 if:    You feel you cannot stop from hurting yourself, your baby, or someone else.   Where to get help 24 hours a day, 7 days a week   If you or someone you know talks about suicide, self-harm, a mental health crisis, a substance use crisis, or any other kind of emotional distress, get help right away. You can:    Call the Suicide and Crisis Lifeline at 146.     Call 7-390-214-TALK (1-269.461.4297).     Text HOME to 189025 to access the Crisis Text Line.   Consider saving these numbers in your phone.  Go to [facility] for more information or to chat online.  Call your doctor now or seek immediate medical care if:    You are having trouble caring for yourself or your baby.     You hear voices.   Contact your doctor if:    You have problems with your medicines.     You do not get better as expected.   Follow-up care is a key part  one another, [facility] [place] ([facility]) is offering virtual support groups to ALL MOTHERS in [place] regardless of the age of your child/children as a way to help weather this emotional storm together. Social support is an important part of self-care during this time of physical distancing.  Virtual postpartum support group meetings available at www.postpartumva.org  Warm Line: 268.398.3454    Breastfeeding Support Groups   1st and 3rd Wednesday of each month at [place]  2nd and 4th Tuesday of each month at [place]      [email or web address]-prenatal-education-events

## 2024-08-28 NOTE — PROGRESS NOTES
Post-Operative  Day 1    Toya Godoy     Assessment: Post-Op day 1 s/p RLTCS, stable    Acute blood loss on chronic anemia: Hgb 9.7->8.5.  Patient is hemodynamically stable and asymptomatic.  She has chronic anemia, has been evaluated by hematology and diagnosed with iron deficiency.  She has required iron infusions in the past as she does not respond well to oral supplementation.  Plan venofer today and will resume iron supplementation at discharge.     DMT2: patient was taking NPH and metformin during the pregnancy.  Plan metformin post-op.  Continue accu checks. Sees endocrine ([facility]).    BMI >40, AYAAN    Anxiety/ depression with history of suicide attempt: continue Cymbalta and plan close follow-up    Plan:     - Routine post-operative care.  - Ambulate today.      Information for the patient's :  ELMO Godoy [[id number]]   , Low Transverse      Subjective:  Patient doing well without significant complaint.  Tolerating diet.  Storm out.  Ambulating.      Vitals:  /85   Pulse 79   Temp 97.6 °F (36.4 °C) (Oral)   Resp 16   SpO2 99%   Breastfeeding Unknown   Temp (24hrs), Av °F (36.7 °C), Min:97.6 °F (36.4 °C), Max:98.4 °F (36.9 °C)      Last 24hr Input/Output:    Intake/Output Summary (Last 24 hours) at 2024 0840  Last data filed at 2024 1800  Gross per 24 hour   Intake 1000 ml   Output 1505 ml   Net -505 ml          Exam:     Patient without distress.               Fundus firm, nontender per nursing fundal checks.  Incision bandaged, clean, dry, intact YAAAN in place              Perineum with normal lochia noted per nursing assessment.              Lower extremities are negative for pathological edema.    Labs:   Lab Results   Component Value Date/Time    WBC 7.9 2024 06:15 AM    WBC 8.8 2024 09:28 AM    WBC 8.8 08/10/2024 07:50 PM    WBC 12.1 2024 08:52 PM    WBC 11.3 2023 12:08 AM    WBC 9.7 2023 08:34 AM    WBC 10.1

## 2024-08-28 NOTE — LACTATION NOTE
This note was copied from a baby's chart.  Mother states that baby has been nursing for \"five minutes on each breast\". Mother states that baby is not opening mouth wide enough. Showed mother exercises to do to help baby open mouth wide. Assisted mother latching baby to the left breast in the football hold with the nipple shield. Audible swallows noted. Educated mother on feeding cues, feeding on demand, offering both breasts at every feeding, and feeding at least every 3 hours.   no

## 2024-08-28 NOTE — LACTATION NOTE
This note was copied from a baby's chart.  Mom said baby was fussy this morning and she wasn't able to get her latched. Mom has large breasts and has been attempting to feed in the football hold. I helped mom this morning get baby latched in the prone position. After several attempts baby was able to get a deep latch. She was sucking rhythmically. Baby's 24 hour weight loss is 7.3%. mom will begin pumping for extra breast stimulation. Any milk collected will be given to the baby. She will feed every 23 hours.

## 2024-08-29 LAB
GLUCOSE BLD STRIP.AUTO-MCNC: 77 MG/DL (ref 65–117)
GLUCOSE BLD STRIP.AUTO-MCNC: 87 MG/DL (ref 65–117)
SERVICE CMNT-IMP: NORMAL
SERVICE CMNT-IMP: NORMAL

## 2024-08-29 PROCEDURE — 1120000000 HC RM PRIVATE OB

## 2024-08-29 PROCEDURE — 82962 GLUCOSE BLOOD TEST: CPT

## 2024-08-29 PROCEDURE — 6370000000 HC RX 637 (ALT 250 FOR IP): Performed by: OBSTETRICS & GYNECOLOGY

## 2024-08-29 RX ADMIN — HYDROMORPHONE HYDROCHLORIDE 2 MG: 2 TABLET ORAL at 09:05

## 2024-08-29 RX ADMIN — IBUPROFEN 800 MG: 400 TABLET ORAL at 16:52

## 2024-08-29 RX ADMIN — DOCUSATE SODIUM 100 MG: 100 CAPSULE, LIQUID FILLED ORAL at 09:04

## 2024-08-29 RX ADMIN — ACETAMINOPHEN 1000 MG: 500 TABLET ORAL at 01:46

## 2024-08-29 RX ADMIN — SIMETHICONE 80 MG: 80 TABLET, CHEWABLE ORAL at 01:57

## 2024-08-29 RX ADMIN — DULOXETINE HYDROCHLORIDE 20 MG: 20 CAPSULE, DELAYED RELEASE ORAL at 09:05

## 2024-08-29 RX ADMIN — Medication 1 TABLET: at 09:03

## 2024-08-29 RX ADMIN — ACETAMINOPHEN 1000 MG: 500 TABLET ORAL at 22:21

## 2024-08-29 RX ADMIN — HYDROMORPHONE HYDROCHLORIDE 2 MG: 2 TABLET ORAL at 01:58

## 2024-08-29 RX ADMIN — HYDROMORPHONE HYDROCHLORIDE 2 MG: 2 TABLET ORAL at 20:57

## 2024-08-29 RX ADMIN — ACETAMINOPHEN 1000 MG: 500 TABLET ORAL at 14:21

## 2024-08-29 RX ADMIN — METFORMIN HYDROCHLORIDE 1000 MG: 500 TABLET, EXTENDED RELEASE ORAL at 20:57

## 2024-08-29 RX ADMIN — DOCUSATE SODIUM 100 MG: 100 CAPSULE, LIQUID FILLED ORAL at 20:57

## 2024-08-29 RX ADMIN — METFORMIN HYDROCHLORIDE 1000 MG: 500 TABLET, EXTENDED RELEASE ORAL at 09:05

## 2024-08-29 RX ADMIN — IBUPROFEN 800 MG: 400 TABLET ORAL at 09:04

## 2024-08-29 ASSESSMENT — PAIN DESCRIPTION - LOCATION
LOCATION: INCISION;ABDOMEN
LOCATION: ABDOMEN
LOCATION: INCISION
LOCATION: INCISION;ABDOMEN

## 2024-08-29 ASSESSMENT — PAIN DESCRIPTION - DESCRIPTORS
DESCRIPTORS: SORE
DESCRIPTORS: ACHING;SORE
DESCRIPTORS: ACHING;CRAMPING
DESCRIPTORS: SORE;CRAMPING

## 2024-08-29 ASSESSMENT — PAIN DESCRIPTION - ORIENTATION
ORIENTATION: LOWER

## 2024-08-29 ASSESSMENT — PAIN SCALES - GENERAL
PAINLEVEL_OUTOF10: 4
PAINLEVEL_OUTOF10: 4
PAINLEVEL_OUTOF10: 8
PAINLEVEL_OUTOF10: 6

## 2024-08-29 NOTE — LACTATION NOTE
This note was copied from a baby's chart.  Mother's milk is in, she is collecting more than an ounce at each pump session.  She is also offering breast to baby too.  She pumped for her first baby but is working toward nursing this baby. Now that her milk is in, she is feeling more confident about feeding.Mother has no further questions for lactation consultant before discharge.

## 2024-08-29 NOTE — LACTATION NOTE
This note was copied from a baby's chart.  24 hour weight loss was 7.37%. Re-weighed baby at 0027 and weight loss is 7.08%. Mother has been putting baby to breast then supplementing with donor milk, and pumping. Mother will call out for any assistance with feedings.

## 2024-08-29 NOTE — PROGRESS NOTES
Post-Operative  Day 2    Samantha Godoy     Assessment: Post-Op day 2, doing well    Plan:   - Routine post-operative care.  - Keep an eye out for postop ileus -- if N/V or worsening distention may need AXR to eval for ileus.  Ok at this point to advance diet judiciously.  - Ambulation encouraged.  - Acute blood loss on chronic anemia: Hgb 9.7->8.5.  Patient is hemodynamically stable and asymptomatic.  She has chronic anemia, has been evaluated by hematology and diagnosed with iron deficiency.  She has required iron infusions in the past as she does not respond well to oral supplementation.  Venofer yesterday   -DMT2: patient was taking NPH and metformin during the pregnancy.  Plan metformin post-op.  Continue accu checks. Sees endocrine ([facility]).  -BMI >40, AYAAN   -Anxiety/ depression with history of suicide attempt: continue Cymbalta and plan close follow-up  - Plan for discharge Tomorrow if pain controlled and at least passing flatus.  .    Information for the patient's :  Walt, GIRL samantha [[id number]]   , Low Transverse  Patient doing well without significant complaint. Pain seems to be worse today.  Cramping and feels like she has to have BM but no flatus yet.  Hasn't really walked much yet.  Tolerating clears -- hasn't had breakfast yet. No N/V.      Vitals:  /85   Pulse 91   Temp 98.3 °F (36.8 °C) (Oral)   Resp 16   SpO2 99%   Breastfeeding Unknown   Temp (24hrs), Av.7 °F (37.1 °C), Min:98.3 °F (36.8 °C), Max:98.9 °F (37.2 °C)        Exam:       Patient without distress.                 Fundus firm, nontender per nursing fundal checks. Abdomen obese, soft, ND     Incision bandaged. Clean, dry, intact.                Perineum with normal lochia noted per nursing assessment.                Lower extremities are negative for pathological edema.    Labs:   Lab Results   Component Value Date/Time    WBC 7.9 2024 06:15 AM    WBC 8.8 2024 09:28 AM    WBC 8.8

## 2024-08-30 VITALS
DIASTOLIC BLOOD PRESSURE: 71 MMHG | SYSTOLIC BLOOD PRESSURE: 101 MMHG | OXYGEN SATURATION: 99 % | HEART RATE: 86 BPM | TEMPERATURE: 98 F | RESPIRATION RATE: 17 BRPM

## 2024-08-30 PROBLEM — Z98.891 HISTORY OF CESAREAN SECTION: Status: RESOLVED | Noted: 2024-08-27 | Resolved: 2024-08-30

## 2024-08-30 LAB
GLUCOSE BLD STRIP.AUTO-MCNC: 72 MG/DL (ref 65–117)
SERVICE CMNT-IMP: NORMAL

## 2024-08-30 PROCEDURE — 82962 GLUCOSE BLOOD TEST: CPT

## 2024-08-30 PROCEDURE — 6370000000 HC RX 637 (ALT 250 FOR IP): Performed by: OBSTETRICS & GYNECOLOGY

## 2024-08-30 RX ORDER — IBUPROFEN 800 MG/1
800 TABLET, FILM COATED ORAL EVERY 8 HOURS
Qty: 120 TABLET | Refills: 3 | Status: SHIPPED | OUTPATIENT
Start: 2024-08-30

## 2024-08-30 RX ORDER — DULOXETIN HYDROCHLORIDE 20 MG/1
20 CAPSULE, DELAYED RELEASE ORAL DAILY
Qty: 30 CAPSULE | Refills: 3 | Status: SHIPPED | OUTPATIENT
Start: 2024-08-30

## 2024-08-30 RX ORDER — HYDROMORPHONE HYDROCHLORIDE 2 MG/1
2 TABLET ORAL EVERY 4 HOURS PRN
Qty: 30 TABLET | Refills: 0 | Status: SHIPPED | OUTPATIENT
Start: 2024-08-30 | End: 2024-09-04

## 2024-08-30 RX ORDER — PSEUDOEPHEDRINE HCL 30 MG
100 TABLET ORAL 2 TIMES DAILY
Qty: 60 CAPSULE | Refills: 1 | Status: SHIPPED | OUTPATIENT
Start: 2024-08-30

## 2024-08-30 RX ADMIN — HYDROMORPHONE HYDROCHLORIDE 2 MG: 2 TABLET ORAL at 13:45

## 2024-08-30 RX ADMIN — Medication 1 TABLET: at 10:01

## 2024-08-30 RX ADMIN — IBUPROFEN 800 MG: 400 TABLET ORAL at 02:43

## 2024-08-30 RX ADMIN — DULOXETINE HYDROCHLORIDE 20 MG: 20 CAPSULE, DELAYED RELEASE ORAL at 10:02

## 2024-08-30 RX ADMIN — METFORMIN HYDROCHLORIDE 1000 MG: 500 TABLET, EXTENDED RELEASE ORAL at 10:02

## 2024-08-30 RX ADMIN — ACETAMINOPHEN 1000 MG: 500 TABLET ORAL at 06:46

## 2024-08-30 RX ADMIN — IBUPROFEN 800 MG: 400 TABLET ORAL at 12:41

## 2024-08-30 RX ADMIN — DOCUSATE SODIUM 100 MG: 100 CAPSULE, LIQUID FILLED ORAL at 10:02

## 2024-08-30 ASSESSMENT — PAIN DESCRIPTION - ORIENTATION
ORIENTATION: ANTERIOR;LOWER
ORIENTATION: LOWER
ORIENTATION: ANTERIOR;LOWER
ORIENTATION: LOWER

## 2024-08-30 ASSESSMENT — PAIN SCALES - GENERAL
PAINLEVEL_OUTOF10: 6
PAINLEVEL_OUTOF10: 5
PAINLEVEL_OUTOF10: 6
PAINLEVEL_OUTOF10: 5

## 2024-08-30 ASSESSMENT — PAIN - FUNCTIONAL ASSESSMENT
PAIN_FUNCTIONAL_ASSESSMENT: ACTIVITIES ARE NOT PREVENTED
PAIN_FUNCTIONAL_ASSESSMENT: ACTIVITIES ARE NOT PREVENTED

## 2024-08-30 ASSESSMENT — PAIN DESCRIPTION - LOCATION
LOCATION: INCISION

## 2024-08-30 ASSESSMENT — PAIN DESCRIPTION - DESCRIPTORS
DESCRIPTORS: SORE

## 2024-08-30 NOTE — PROGRESS NOTES
Post-Operative  Day 3    Toya Godoy       Assessment: Post-Op day 3, doing well    - Ambulation encouraged.  - Acute blood loss on chronic anemia: Hgb 9.7->8.5.  Patient is hemodynamically stable and asymptomatic.  She has chronic anemia, has been evaluated by hematology and diagnosed with iron deficiency.  She has required iron infusions in the past as she does not respond well to oral supplementation.  Venofer yesterday   -DMT2: patient was taking NPH and metformin during the pregnancy.  Plan metformin post-op.  Continue accu checks. Sees endocrine ([facility]).  -BMI >40, AYAAN   -Anxiety/ depression with history of suicide attempt: continue Cymbalta, will increase dose due to EPDS 17 and plan close follow-up    Plan:   - Discharge home today.  - Follow up in office in 1 week(s) with [facility] [place].  - Pain medication prescription(s) sent.  - Questions answered.      Information for the patient's :  Walt, ELMO singh [[id number]]   , Low Transverse Patient doing well without significant complaint. Tolerating regular diet.  Ambulating.  Voiding without difficulty. Passing flatus!    Vitals:  /87   Pulse 92   Temp 98.2 °F (36.8 °C) (Oral)   Resp 16   SpO2 95%   Breastfeeding Unknown   Temp (24hrs), Av.1 °F (36.7 °C), Min:98 °F (36.7 °C), Max:98.2 °F (36.8 °C)        Exam:       Patient without distress.                 Fundus firm, nontender per nursing fundal checks.     Incision bandaged. AYAAN in place.  Clean, dry, intact. Active BS.                Perineum with normal lochia noted per nursing assessment.                Lower extremities are negative for pathological edema.    Labs:   Lab Results   Component Value Date/Time    WBC 7.9 2024 06:15 AM    WBC 8.8 2024 09:28 AM    WBC 8.8 08/10/2024 07:50 PM    WBC 12.1 2024 08:52 PM    WBC 11.3 2023 12:08 AM    WBC 9.7 2023 08:34 AM    WBC 10.1 2023 05:18 PM     WBC 12.6 01/25/2023 04:32 AM    WBC 10.6 12/12/2022 10:24 AM    WBC 11.5 10/25/2022 01:08 PM    WBC 12.4 10/17/2022 04:16 PM    WBC 12.5 02/22/2022 01:40 PM    WBC 11.8 08/19/2021 10:33 AM    WBC 11.9 07/26/2021 01:13 AM    WBC 9.6 07/22/2021 01:49 PM    WBC 13.5 12/28/2020 08:50 PM    WBC 16.7 12/12/2020 05:15 PM    WBC 10.7 09/11/2019 11:51 AM    HGB 8.5 08/28/2024 06:15 AM    HGB 9.7 08/26/2024 09:28 AM    HGB 10.3 08/10/2024 07:50 PM    HGB 10.7 04/29/2024 08:52 PM    HGB 9.9 03/01/2023 12:08 AM    HGB 11.1 02/28/2023 08:34 AM    HGB 10.7 02/06/2023 05:18 PM    HGB 11.4 01/25/2023 04:32 AM    HGB 10.9 12/12/2022 10:24 AM    HGB 11.1 10/25/2022 01:08 PM    HGB 11.5 10/17/2022 04:16 PM    HGB 11.4 02/22/2022 01:40 PM    HGB 12.0 08/19/2021 10:33 AM    HGB 10.9 07/26/2021 01:13 AM    HGB 11.7 07/22/2021 01:49 PM    HGB 11.3 12/28/2020 08:50 PM    HGB 11.5 12/12/2020 05:15 PM    HGB 12.0 09/11/2019 11:51 AM    HCT 26.2 08/28/2024 06:15 AM    HCT 29.8 08/26/2024 09:28 AM    HCT 31.4 08/10/2024 07:50 PM    HCT 30.7 04/29/2024 08:52 PM    HCT 29.4 03/01/2023 12:08 AM    HCT 33.0 02/28/2023 08:34 AM    HCT 31.3 02/06/2023 05:18 PM    HCT 35.1 01/25/2023 04:32 AM    HCT 33.3 12/12/2022 10:24 AM    HCT 32.9 10/25/2022 01:08 PM    HCT 33.3 10/17/2022 04:16 PM    HCT 37.4 02/22/2022 01:40 PM    HCT 38.7 08/19/2021 10:33 AM    HCT 34.2 07/26/2021 01:13 AM    HCT 35.9 07/22/2021 01:49 PM    HCT 34.7 12/28/2020 08:50 PM    HCT 35.5 12/12/2020 05:15 PM    HCT 36.2 09/11/2019 11:51 AM     08/28/2024 06:15 AM     08/26/2024 09:28 AM     08/10/2024 07:50 PM     04/29/2024 08:52 PM     03/01/2023 12:08 AM     02/28/2023 08:34 AM     02/06/2023 05:18 PM     01/25/2023 04:32 AM     12/12/2022 10:24 AM     10/25/2022 01:08 PM     10/17/2022 04:16 PM     02/22/2022 01:40 PM     08/19/2021 10:33 AM     07/26/2021 01:13 AM

## 2024-08-30 NOTE — DISCHARGE SUMMARY
Obstetrical Discharge Summary     Name: Toya Godoy MRN: [id number]  SSN: [id number]    YOB: 1990  Age: 34 y.o.  Sex: female      Admit Date: 2024    Discharge Date: 2024     Admitting Physician: Maria Guadalupe DE ANDA MD     Attending Physician:  Maria Guadalupe Hobbs MD     Admission Diagnoses: Delivery by  section using transverse incision of lower segment of uterus [O82]  History of  section [Z98.891]    Discharge Diagnoses:   Information for the patient's :  ELMO Godoy [[id number]]   @[phone]@     Additional Diagnoses:  No components found for: \"OBEXTABORH\", \"OBEXTABSCRN\", \"OBEXTRUBELLA\", \"OBEXTGRBS\"    Hospital Course:     Acute blood loss on chronic anemia: Hgb 9.7->8.5.  Patient is hemodynamically stable and asymptomatic.  She has chronic anemia, has been evaluated by hematology and diagnosed with iron deficiency.  She has required iron infusions in the past as she does not respond well to oral supplementation.  Venofer yesterday   -DMT2: patient was taking NPH and metformin during the pregnancy.  Plan metformin post-op.  Continue accu checks. Sees endocrine ([facility]).  -BMI >40, AYAAN   -Anxiety/ depression with history of suicide attempt: continue Cymbalta, will increase dose due to EPDS 17 and plan close follow-up    Patient Instructions:   Current Discharge Medication List        START taking these medications    Details   HYDROmorphone (DILAUDID) 2 MG tablet Take 1 tablet by mouth every 4 hours as needed for Pain for up to 5 days. Max Daily Amount: 12 mg  Qty: 30 tablet, Refills: 0    Comments: Reduce doses taken as pain becomes manageable  Associated Diagnoses: History of  section      docusate sodium (COLACE, DULCOLAX) 100 MG CAPS Take 100 mg by mouth 2 times daily  Qty: 60 capsule, Refills: 1           CONTINUE these medications which have CHANGED    Details   DULoxetine (CYMBALTA) 20 MG extended release capsule Take 1 capsule by mouth  daily  Qty: 30 capsule, Refills: 3      ibuprofen (ADVIL;MOTRIN) 800 MG tablet Take 1 tablet by mouth in the morning and 1 tablet at noon and 1 tablet in the evening.  Qty: 120 tablet, Refills: 3           CONTINUE these medications which have NOT CHANGED    Details   magnesium oxide (MAG-OX) 400 (240 Mg) MG tablet Take 1 tablet by mouth daily      Prenatal MV-Min-Fe Fum-FA-DHA (PRENATAL 1 PO) Take by mouth      metFORMIN (GLUCOPHAGE-XR) 500 MG extended release tablet Take 2 tablets by mouth 2 times daily (before meals)           STOP taking these medications       metFORMIN (GLUCOPHAGE) 1000 MG tablet Comments:   Reason for Stopping:         insulin regular (HUMULIN R;NOVOLIN R) 100 UNIT/ML injection Comments:   Reason for Stopping:         aspirin 81 MG EC tablet Comments:   Reason for Stopping:         insulin regular (HUMULIN R;NOVOLIN R) 100 UNIT/ML injection Comments:   Reason for Stopping:               Disposition at Discharge: Home or self care    Condition at Discharge: Stable    Reference my discharge instructions.    No follow-ups on file.     Signed By:  Kavya Wiggins MD     August 30, 2024

## 2024-08-30 NOTE — LACTATION NOTE
This note was copied from a baby's chart.  Mom and baby scheduled for discharge today. Mom has been pumping and giving the baby her own milk. She was giving donor milk until her milk came in. She pumped 2 ounces at the last pumping. Moms goal is to get baby latched directly to the breast. Baby has been taking a bottle. We put baby to the breast but she was getting frustrated. Mom has a nipple shield. We put that on and used a small amount of sweetease and then baby was able to begin sucking rhythmically.     Mom will continue to pump but she will put baby to the breast. If she doesn't nurse well she will supplement with her own milk. As baby is breast feeding more consistently mom will do less pumping.     Breast feeding teaching completed and all questions answered.

## (undated) DEVICE — DRAPE THERMABASIN INTRATEMP --

## (undated) DEVICE — DEVON™ KNEE AND BODY STRAP 60" X 3" (1.5 M X 7.6 CM): Brand: DEVON

## (undated) DEVICE — MEDI-VAC NON-CONDUCTIVE SUCTION TUBING: Brand: CARDINAL HEALTH

## (undated) DEVICE — SOLUTION IRRIG 1000ML STRL H2O USP PLAS POUR BTL

## (undated) DEVICE — TRAP TISS DISP FOR COLL SYS BERK SAFETOUCH

## (undated) DEVICE — FILTER SET UTER VAC CURET SYS -- GYRUS

## (undated) DEVICE — ELECTRODE PT RET AD L9FT HI MOIST COND ADH HYDRGEL CORDED

## (undated) DEVICE — 34" SINGLE PATIENT USE HOVERMATT BREATHABLE: Brand: SINGLE PATIENT USE HOVERMATT

## (undated) DEVICE — SOLUTION IRRIG 1000ML 0.9% SOD CHL USP POUR PLAS BTL

## (undated) DEVICE — ELECTROSURGICAL DEVICE HOLSTER;FOR USE WITH MAXIMUM PEAK VOLTAGE OF 4000 V: Brand: FORCE TRIVERSE

## (undated) DEVICE — CATHETER URIN 16FR 30CC BLLN 2 W F LUBRI-SIL IC

## (undated) DEVICE — STAPLER SKN INSORB 30 COUNT --

## (undated) DEVICE — GOWN,SIRUS,FABRNF,XL,20/CS: Brand: MEDLINE

## (undated) DEVICE — KENDALL SCD EXPRESS SLEEVES, KNEE LENGTH, MEDIUM: Brand: KENDALL SCD

## (undated) DEVICE — Device

## (undated) DEVICE — SPROTTE TRAY 24G X 4'' (103MM) NEEDLE WITH 40MM INTRODUCER (SPINAL)

## (undated) DEVICE — TRAY PREP DRY W/ PREM GLV 2 APPL 6 SPNG 2 UNDPD 1 OVERWRAP

## (undated) DEVICE — COVER LT HNDL PLAS RIG 1 PER PK

## (undated) DEVICE — SHEET,DRAPE,UNDERBUTTOCK,GRAD POUCH,PORT: Brand: MEDLINE

## (undated) DEVICE — SUTURE MONOCRYL + SZ 2-0 L36IN ABSRB UD CT-1 L36MM 1/2 CIR MCP945H

## (undated) DEVICE — SUT MCRYL 0 36IN CT1 UD --

## (undated) DEVICE — COVERALL PREM SMS 2XL KNIT --

## (undated) DEVICE — SUTURE VICRYL + SZ 0 L36IN ABSRB VLT L40MM CT 1/2 CIR TAPR VCP358H

## (undated) DEVICE — WOUND RETRACTOR AND PROTECTOR: Brand: ALEXIS O WOUND PROTECTOR-RETRACTOR

## (undated) DEVICE — GARMENT,MEDLINE,DVT,INT,CALF,MED, GEN2: Brand: MEDLINE

## (undated) DEVICE — TOWEL SURG W17XL27IN STD BLU COT NONFENESTRATED PREWASHED

## (undated) DEVICE — LIGHT HANDLE: Brand: DEVON

## (undated) DEVICE — COVERALLS PROTCT 2XL WHT SMS ANTISTATIC PREM KNIT CUF FULL

## (undated) DEVICE — TRAXI PANNICULUS RETRACTOR WITH RETENTUS TECHNOLOGY (BMI 30-50): Brand: TRAXI® PANNICULUS RETRACTOR

## (undated) DEVICE — STERILE POLYISOPRENE POWDER-FREE SURGICAL GLOVES: Brand: PROTEXIS

## (undated) DEVICE — CURETTE VAC DIA9MM PLAS CRV OPN TIP RIG DISP VACURET D/E

## (undated) DEVICE — PICO 7 10CM X 30CM: Brand: PICO™ 7

## (undated) DEVICE — NEEDLE SPNL 22GA L3.5IN BLK HUB S STL REG WALL FIT STYL W/

## (undated) DEVICE — STERILE POLYISOPRENE POWDER-FREE SURGICAL GLOVES WITH EMOLLIENT COATING: Brand: PROTEXIS

## (undated) DEVICE — COLLECTION KT SUC TISS BERK -- GYRUS

## (undated) DEVICE — DRAPE,LITHOTOMY,STERILE: Brand: MEDLINE

## (undated) DEVICE — PENCIL SMK EVAC 10 FT BLADE ELECTRD ROCKER FOR TELSCP

## (undated) DEVICE — PREP SKN CHLRAPRP APL 26ML STR --

## (undated) DEVICE — HANDLE LT SNAP ON ULT DURABLE LENS FOR TRUMPF ALC DISPOSABLE

## (undated) DEVICE — APPLICATOR MEDICATED 26 CC SOLUTION HI LT ORNG CHLORAPREP

## (undated) DEVICE — SUTURE VCRL SZ 1 L36IN ABSRB VLT L48MM CTX 1/2 CIR J371H

## (undated) DEVICE — PACK PROCEDURE SURG C SECT KT SMH

## (undated) DEVICE — Z DISCONTINUED NO SUB SUTURE PLN GUT SZ 2-0 L27IN ABSRB YELLOWISH TAN L70MM XLH 53T

## (undated) DEVICE — WOUND RETRACTOR AND PROTECTOR: Brand: ALEXIS WOUND PROTECTOR-RETRACTOR

## (undated) DEVICE — ATTACHMENT SMK 3/8INX10FT VALLEYLAB

## (undated) DEVICE — REM POLYHESIVE ADULT PATIENT RETURN ELECTRODE: Brand: VALLEYLAB

## (undated) DEVICE — ROYALSILK SURGICAL GOWN, L: Brand: CONVERTORS

## (undated) DEVICE — TUBING SUCT L6FT ID0.5IN CLR PLAS M CONN

## (undated) DEVICE — 3000CC GUARDIAN II: Brand: GUARDIAN

## (undated) DEVICE — GARMENT,MEDLINE,DVT,INT,CALF,LG, GEN2: Brand: MEDLINE

## (undated) DEVICE — STAPLER SKIN SQ 30 ABSRB STPL DISP INSORB ORDER VIA PHONE OR EMAIL

## (undated) DEVICE — SUTURE MONOCRYL SZ0 L36IN VIO ABSORB CT-1 NDL HALF CIR MONOCRYL PLUS

## (undated) DEVICE — SOLIDIFIER FLUID 3000 CC ABSORB

## (undated) DEVICE — AGENT HEMSTAT 3GM OXIDIZED REGENERATED CELOS ABSRB FOR CONT (ORDER MULTIPLES OF 5EA)

## (undated) DEVICE — SYR 10ML LUER LOK 1/5ML GRAD --

## (undated) DEVICE — DRESSING SIL W4XL5IN ANTIBACT GELLING FBR CYTOFORM

## (undated) DEVICE — SPONGE GZ W4XL4IN COT RADPQ HIGHLY ABSRB

## (undated) DEVICE — LARGE, DISPOSABLE ALEXIS O C-SECTION PROTECTOR - RETRACTOR: Brand: ALEXIS ® O C-SECTION PROTECTOR - RETRACTOR

## (undated) DEVICE — PAD,SANITARY,11 IN,MAXI,N-STRL,IND WRAP: Brand: MEDLINE

## (undated) DEVICE — COVER,TABLE,60X90,STERILE: Brand: MEDLINE

## (undated) DEVICE — CATH FOLEY 16F LUBRI-SIL IC --